# Patient Record
Sex: MALE | Race: WHITE | NOT HISPANIC OR LATINO | Employment: OTHER | URBAN - METROPOLITAN AREA
[De-identification: names, ages, dates, MRNs, and addresses within clinical notes are randomized per-mention and may not be internally consistent; named-entity substitution may affect disease eponyms.]

---

## 2017-01-07 DIAGNOSIS — E03.9 HYPOTHYROIDISM, UNSPECIFIED TYPE: Chronic | ICD-10-CM

## 2017-01-07 RX ORDER — TADALAFIL 5 MG/1
5 TABLET ORAL DAILY
Qty: 90 TAB | Refills: 1 | Status: SHIPPED | OUTPATIENT
Start: 2017-01-07 | End: 2017-05-27 | Stop reason: SDUPTHER

## 2017-01-07 RX ORDER — LEVOTHYROXINE SODIUM 175 UG/1
TABLET ORAL
Qty: 90 TAB | Refills: 1 | Status: SHIPPED | OUTPATIENT
Start: 2017-01-07 | End: 2017-06-26 | Stop reason: SDUPTHER

## 2017-01-07 NOTE — Clinical Note
January 9, 2017        Luan Schmidt  [address] [facility] [address]        Dear Luan:    .We recently received a medication refill request form your pharmacy for yourlevothyroxine (SYNTHROID) 175 MCG Tab, tadalafil (CIALIS) 5 MG tablet Dr. Rutherford has refilled these medication for 30 each only and has sent it to your pharmacy, he has ordered fasting labs for you to complete which we have attached so we can make certain the proper dosing is correct on your medications .       If you have any questions please feel free to contact me at [phone].        If you have any questions or concerns, please don't hesitate to call.        Sincerely,        Luis E Rutherford M.D.    Electronically Signed

## 2017-01-30 ENCOUNTER — HOSPITAL ENCOUNTER (OUTPATIENT)
Dept: LAB | Facility: MEDICAL CENTER | Age: 62
End: 2017-01-30
Attending: INTERNAL MEDICINE
Payer: COMMERCIAL

## 2017-01-30 DIAGNOSIS — E03.9 HYPOTHYROIDISM, UNSPECIFIED TYPE: Chronic | ICD-10-CM

## 2017-01-30 LAB — TSH SERPL DL<=0.005 MIU/L-ACNC: 1.38 UIU/ML (ref 0.3–3.7)

## 2017-01-30 PROCEDURE — 36415 COLL VENOUS BLD VENIPUNCTURE: CPT

## 2017-01-30 PROCEDURE — 84443 ASSAY THYROID STIM HORMONE: CPT

## 2017-01-31 ENCOUNTER — TELEPHONE (OUTPATIENT)
Dept: MEDICAL GROUP | Facility: MEDICAL CENTER | Age: 62
End: 2017-01-31

## 2017-01-31 NOTE — TELEPHONE ENCOUNTER
----- Message from Luis E Rutherford M.D. sent at 1/30/2017 10:02 PM PST -----  Please notify patient that the thyroid test is normal and he can continue on the current dose six days per week

## 2017-03-15 RX ORDER — ZOLPIDEM TARTRATE 10 MG/1
10 TABLET ORAL NIGHTLY PRN
Qty: 90 EACH | Refills: 1 | Status: SHIPPED
Start: 2017-03-15 | End: 2017-09-07 | Stop reason: SDUPTHER

## 2017-05-06 ENCOUNTER — OFFICE VISIT (OUTPATIENT)
Dept: URGENT CARE | Facility: PHYSICIAN GROUP | Age: 62
End: 2017-05-06
Payer: COMMERCIAL

## 2017-05-06 VITALS
HEART RATE: 55 BPM | SYSTOLIC BLOOD PRESSURE: 132 MMHG | OXYGEN SATURATION: 93 % | WEIGHT: 243 LBS | BODY MASS INDEX: 32.91 KG/M2 | HEIGHT: 72 IN | DIASTOLIC BLOOD PRESSURE: 80 MMHG | TEMPERATURE: 98.2 F

## 2017-05-06 DIAGNOSIS — J98.01 ACUTE BRONCHOSPASM: ICD-10-CM

## 2017-05-06 DIAGNOSIS — J22 ACUTE LOWER RESPIRATORY INFECTION: Primary | ICD-10-CM

## 2017-05-06 DIAGNOSIS — H10.9 CONJUNCTIVITIS OF RIGHT EYE, UNSPECIFIED CONJUNCTIVITIS TYPE: ICD-10-CM

## 2017-05-06 PROCEDURE — 99214 OFFICE O/P EST MOD 30 MIN: CPT | Performed by: PHYSICIAN ASSISTANT

## 2017-05-06 RX ORDER — ALBUTEROL SULFATE 90 UG/1
2 AEROSOL, METERED RESPIRATORY (INHALATION) EVERY 4 HOURS PRN
Qty: 1 INHALER | Refills: 1 | Status: SHIPPED | OUTPATIENT
Start: 2017-05-06 | End: 2017-09-07

## 2017-05-06 RX ORDER — POLYMYXIN B SULFATE AND TRIMETHOPRIM 1; 10000 MG/ML; [USP'U]/ML
1 SOLUTION OPHTHALMIC EVERY 4 HOURS
Qty: 10 ML | Refills: 0 | Status: SHIPPED | OUTPATIENT
Start: 2017-05-06 | End: 2017-05-13

## 2017-05-06 RX ORDER — DOXYCYCLINE HYCLATE 100 MG
100 TABLET ORAL 2 TIMES DAILY
Qty: 20 TAB | Refills: 0 | Status: SHIPPED | OUTPATIENT
Start: 2017-05-06 | End: 2017-05-16

## 2017-05-06 ASSESSMENT — ENCOUNTER SYMPTOMS: COUGH: 1

## 2017-05-06 NOTE — MR AVS SNAPSHOT
Luan Schmidt   2017 2:30 PM   Office Visit   MRN: [id number]    Department:  [facility]   Dept Phone:  [phone]    Description:  Male : 1955   Provider:  Rebekah Schneider PA-C           Reason for Visit     Cough sore thrat x1 wk, poss pink eye       Allergies as of 2017     No Known Allergies      You were diagnosed with     Acute lower respiratory infection   [447653]  -  Primary     Acute bronchospasm   [519.11.ICD-9-CM]       Conjunctivitis of right eye, unspecified conjunctivitis type   [6630226]         Vital Signs     Blood Pressure Pulse Temperature Height Weight Body Mass Index    132/80 mmHg 55 36.8 °C (98.2 °F) 1.829 m (6') 110.224 kg (243 lb) 32.95 kg/m2    Oxygen Saturation Smoking Status                93% Former Smoker          Basic Information     Date Of Birth Sex Race Ethnicity Preferred Language    1955 Male White Non- English      Problem List              ICD-10-CM Priority Class Noted - Resolved    Hypothyroid (Chronic) E03.9   2009 - Present    Dyslipidemia (Chronic) E78.5   2009 - Present    Rosacea L71.9   2009 - Present    Hypertension (Chronic) I10   2009 - Present    History of gout (Chronic) Z87.39   2009 - Present    S/P appendectomy Z90.49   2009 - Present    Preventative health care (Chronic) Z00.00   2009 - Present    Erectile dysfunction N52.9   2011 - Present    History of hearing loss Z86.69   2012 - Present    Peripheral neuropathy G62.9   2012 - Present    Obesity (BMI 30-39.9) E66.9   2014 - Present    Insomnia (Chronic) G47.00   2014 - Present    Renal cyst N28.1   10/29/2015 - Present    Dyspepsia R10.13   2016 - Present    Anxiety F41.9   2016 - Present      Health Maintenance        Date Due Completion Dates    IMM DTaP/Tdap/Td Vaccine (2 - Td) 2021    COLONOSCOPY 2022 (Done)    Override on 2012: Done               Current Immunizations     SHINGLES VACCINE 8/26/2014    Tdap Vaccine 12/30/2011      Below and/or attached are the medications your provider expects you to take. Review all of your home medications and newly ordered medications with your provider and/or pharmacist. Follow medication instructions as directed by your provider and/or pharmacist. Please keep your medication list with you and share with your provider. Update the information when medications are discontinued, doses are changed, or new medications (including over-the-counter products) are added; and carry medication information at all times in the event of emergency situations     Allergies:  No Known Allergies          Medications  Valid as of: May 06, 2017 -  3:01 PM    Generic Name Brand Name Tablet Size Instructions for use    Albuterol Sulfate (Aero Soln) albuterol 108 (90 BASE) MCG/ACT Inhale 2 Puffs by mouth every four hours as needed for Shortness of Breath (bronchospasm).        Aspirin (Chew Tab) ASA 81 MG Take 1 Tab by mouth every day.        Atorvastatin Calcium (Tab) LIPITOR 20 MG Take 1 Tab by mouth every day.        Doxycycline Hyclate (Tab) VIBRAMYCIN 100 MG Take 1 Tab by mouth 2 times a day for 10 days.        Ergocalciferol (Cap) DRISDOL 32325 UNIT Take 1 Cap by mouth every 7 days.        Hydrocod Polst-Chlorphen Polst (Suspension Extended Release) TUSSIONEX 10-8 MG/5ML Take 5 mL by mouth every 12 hours.        Indomethacin (Cap) INDOCIN 50 MG Take 1 Cap by mouth 3 times a day as needed (gout flare up).        Levothyroxine Sodium (Tab) SYNTHROID 175 MCG One po 6 days per week        Lisinopril (Tab) PRINIVIL 10 MG Take 1 Tab by mouth every day.        Metoprolol Succinate (TABLET SR 24 HR) TOPROL  MG Take 1 Tab by mouth every day.        Polymyxin B-Trimethoprim (Solution) POLYTRIM 24320-3.1 UNIT/ML-% Place 1 Drop in both eyes every 4 hours for 7 days.        Sertraline HCl (Tab) ZOLOFT 50 MG Take 1 Tab by mouth every day.         Tadalafil (Tab) CIALIS 5 MG Take 1 Tab by mouth every day.        Zolpidem Tartrate (Tab) AMBIEN 10 MG Take 1 Tab by mouth at bedtime as needed for Sleep.        .                 Medicines prescribed today were sent to:     [facility] - [address]    [address] [facility] [[email or web address]] [address]    Phone: [phone] Fax: [phone]    Open 24 Hours?: No    [id number] - [place], [place] - [address]    [address] [facility] [place] [address]    Phone: [phone] Fax: [phone]    Open 24 Hours?: No    \[facility]"" [facility] #[id number] - [place] - [address]    [address] [place] [address]    Phone: [phone] Fax: [phone]    Open 24 Hours?: No      Medication refill instructions:       If your prescription bottle indicates you have medication refills left, it is not necessary to call your provider’s office. Please contact your pharmacy and they will refill your medication.    If your prescription bottle indicates you do not have any refills left, you may request refills at any time through one of the following ways: The online [employer] system (except Urgent Care), by calling your provider’s office, or by asking your pharmacy to contact your provider’s office with a refill request. Medication refills are processed only during regular business hours and may not be available until the next business day. Your provider may request additional information or to have a follow-up visit with you prior to refilling your medication.   *Please Note: Medication refills are assigned a new Rx number when refilled electronically. Your pharmacy may indicate that no refills were authorized even though a new prescription for the same medication is available at the pharmacy. Please request the medicine by name with the pharmacy before contacting your provider for a refill.        Instructions    Cough, Adult   A cough is a reflex that helps clear your throat and airways.  It can help heal the body or may be a reaction to an irritated airway. A cough may only last 2 or 3 weeks (acute) or may last more than 8 weeks (chronic).   CAUSES  Acute cough:  · Viral or bacterial infections.  Chronic cough:  · Infections.  · Allergies.  · Asthma.  · Post-nasal drip.  · Smoking.  · Heartburn or acid reflux.  · Some medicines.  · Chronic lung problems (COPD).  · Cancer.  SYMPTOMS   · Cough.  · Fever.  · Chest pain.  · Increased breathing rate.  · High-pitched whistling sound when breathing (wheezing).  · Colored mucus that you cough up (sputum).  TREATMENT   · A bacterial cough may be treated with antibiotic medicine.  · A viral cough must run its course and will not respond to antibiotics.  · Your caregiver may recommend other treatments if you have a chronic cough.  HOME CARE INSTRUCTIONS   · Only take over-the-counter or prescription medicines for pain, discomfort, or fever as directed by your caregiver. Use cough suppressants only as directed by your caregiver.  · Use a cold steam vaporizer or humidifier in your bedroom or home to help loosen secretions.  · Sleep in a semi-upright position if your cough is worse at night.  · Rest as needed.  · Stop smoking if you smoke.  SEEK IMMEDIATE MEDICAL CARE IF:   · You have pus in your sputum.  · Your cough starts to worsen.  · You cannot control your cough with suppressants and are losing sleep.  · You begin coughing up blood.  · You have difficulty breathing.  · You develop pain which is getting worse or is uncontrolled with medicine.  · You have a fever.  MAKE SURE YOU:   · Understand these instructions.  · Will watch your condition.  · Will get help right away if you are not doing well or get worse.     This information is not intended to replace advice given to you by your health care provider. Make sure you discuss any questions you have with your health care provider.     Document Released: 06/15/2012 Document Revised: 03/11/2013 Document  Reviewed: 02/24/2016  [employer] Interactive Patient Education ©2016 Elsevier Inc.         Other Notes About Your Plan     8/19/16 RONALD score 10, PHQ 4, start zoloft 50 mg  FIT                       MyChart Status: Patient Declined

## 2017-05-06 NOTE — PROGRESS NOTES
Subjective:      Luan Scmhidt is a 61 y.o. male who presents with Cough    PMH:  has a past medical history of Dyslipidemia (8/23/2009); Hypertension; and Renal cyst (10/29/2015). He also has no past medical history of Ulcer (CMS-HCC).  MEDS:   Current outpatient prescriptions:   •  doxycycline (VIBRAMYCIN) 100 MG Tab, Take 1 Tab by mouth 2 times a day for 10 days., Disp: 20 Tab, Rfl: 0  •  albuterol 108 (90 BASE) MCG/ACT Aero Soln inhalation aerosol, Inhale 2 Puffs by mouth every four hours as needed for Shortness of Breath (bronchospasm)., Disp: 1 Inhaler, Rfl: 1  •  Hydrocod Polst-CPM Polst ER (TUSSIONEX) 10-8 MG/5ML Suspension Extended Release, Take 5 mL by mouth every 12 hours., Disp: 140 mL, Rfl: 0  •  polymixin-trimethoprim (POLYTRIM) 75582-2.1 UNIT/ML-% Solution, Place 1 Drop in both eyes every 4 hours for 7 days., Disp: 10 mL, Rfl: 0  •  sertraline (ZOLOFT) 50 MG Tab, Take 1 Tab by mouth every day., Disp: 90 Tab, Rfl: 1  •  zolpidem (AMBIEN) 10 MG Tab, Take 1 Tab by mouth at bedtime as needed for Sleep., Disp: 90 Each, Rfl: 1  •  lisinopril (PRINIVIL) 10 MG Tab, Take 1 Tab by mouth every day., Disp: 90 Tab, Rfl: 2  •  metoprolol SR (TOPROL XL) 100 MG TABLET SR 24 HR, Take 1 Tab by mouth every day., Disp: 90 Tab, Rfl: 2  •  levothyroxine (SYNTHROID) 175 MCG Tab, One po 6 days per week, Disp: 90 Tab, Rfl: 1  •  tadalafil (CIALIS) 5 MG tablet, Take 1 Tab by mouth every day., Disp: 90 Tab, Rfl: 1  •  atorvastatin (LIPITOR) 20 MG Tab, Take 1 Tab by mouth every day., Disp: 90 Tab, Rfl: 2  •  indomethacin (INDOCIN) 50 MG Cap, Take 1 Cap by mouth 3 times a day as needed (gout flare up)., Disp: 90 Cap, Rfl: 2  •  ergocalciferol (DRISDOL) 17360 UNIT capsule, Take 1 Cap by mouth every 7 days., Disp: 12 Cap, Rfl: 6  •  aspirin (ASA) 81 MG CHEW, Take 1 Tab by mouth every day., Disp: 100 Each, Rfl: 11  ALLERGIES: No Known Allergies  SURGHX:   Past Surgical History   Procedure Laterality Date   • Appendectomy        SOCHX:  reports that he has quit smoking. He has never used smokeless tobacco. He reports that he drinks alcohol. He reports that he does not use illicit drugs.  FH: family history includes Heart Disease in his father. Reviewed with patient/family. Not pertinent to this complaint.            HPI Comments: Patient presents with:  Cough: cough x 3 weeks, sore throat x1 wk, poss pink eye past few days.        Cough  This is a new problem. The current episode started 1 to 4 weeks ago. The problem has been gradually worsening. The problem occurs every few minutes. The cough is productive of sputum. Associated symptoms include eye redness, postnasal drip, rhinorrhea, a sore throat, shortness of breath and wheezing. Pertinent negatives include no chest pain or fever. The symptoms are aggravated by exercise and lying down. Risk factors for lung disease include smoking/tobacco exposure. He has tried body position changes, OTC cough suppressant and rest for the symptoms. The treatment provided no relief. His past medical history is significant for bronchitis. There is no history of COPD.       Review of Systems   Constitutional: Negative for fever.   HENT: Positive for congestion, postnasal drip, rhinorrhea and sore throat.    Eyes: Positive for discharge and redness.   Respiratory: Positive for cough, sputum production, shortness of breath and wheezing.    Cardiovascular: Negative for chest pain and palpitations.   All other systems reviewed and are negative.         Objective:     /80 mmHg  Pulse 55  Temp(Src) 36.8 °C (98.2 °F)  Ht 1.829 m (6')  Wt 110.224 kg (243 lb)  BMI 32.95 kg/m2  SpO2 93%     Physical Exam   Constitutional: He appears well-developed and well-nourished. He appears ill. No distress.   HENT:   Head: Normocephalic.   Right Ear: Tympanic membrane normal.   Left Ear: Tympanic membrane normal.   Nose: Mucosal edema and rhinorrhea present.   Mouth/Throat: Uvula is midline. Posterior  oropharyngeal erythema present. No posterior oropharyngeal edema.   Eyes: EOM and lids are normal. Pupils are equal, round, and reactive to light. Right eye exhibits discharge. Left eye exhibits no discharge. Right conjunctiva is injected.   Neck: Normal range of motion. Neck supple.   Cardiovascular: Normal rate, regular rhythm and normal heart sounds.    Pulmonary/Chest: Effort normal. He has no decreased breath sounds. He has wheezes. He has rhonchi. He has no rales.   Abdominal: Soft.   Musculoskeletal: Normal range of motion.   Lymphadenopathy:     He has no cervical adenopathy.   Neurological: He is alert.   Skin: Skin is warm and dry.   Psychiatric: He has a normal mood and affect.   Nursing note and vitals reviewed.              Assessment/Plan:     1. Acute lower respiratory infection  doxycycline (VIBRAMYCIN) 100 MG Tab    albuterol 108 (90 BASE) MCG/ACT Aero Soln inhalation aerosol    Hydrocod Polst-CPM Polst ER (TUSSIONEX) 10-8 MG/5ML Suspension Extended Release    polymixin-trimethoprim (POLYTRIM) 72671-5.1 UNIT/ML-% Solution   2. Acute bronchospasm  doxycycline (VIBRAMYCIN) 100 MG Tab    albuterol 108 (90 BASE) MCG/ACT Aero Soln inhalation aerosol    Hydrocod Polst-CPM Polst ER (TUSSIONEX) 10-8 MG/5ML Suspension Extended Release    polymixin-trimethoprim (POLYTRIM) 13894-4.1 UNIT/ML-% Solution   3. Conjunctivitis of right eye, unspecified conjunctivitis type       PT instructed not to drive or operate heavy machinery while taking this medication as it causes drowsiness.  PT also instructed not to drink alcohol while taking this medication because it contains narcotics. PT verbalized understanding of these instructions.     PT can continue OTC medications, increase fluids and rest until symptoms improve.     PT should follow up with PCP in 1-2 days for re-evaluation if symptoms have not improved.  Discussed red flags and reasons to return to UC or ED.  Pt and/or family verbalized understanding of  diagnosis and follow up instructions and was given informational handout on diagnosis.  PT discharged.

## 2017-05-06 NOTE — PATIENT INSTRUCTIONS
Cough, Adult   A cough is a reflex that helps clear your throat and airways. It can help heal the body or may be a reaction to an irritated airway. A cough may only last 2 or 3 weeks (acute) or may last more than 8 weeks (chronic).   CAUSES  Acute cough:  · Viral or bacterial infections.  Chronic cough:  · Infections.  · Allergies.  · Asthma.  · Post-nasal drip.  · Smoking.  · Heartburn or acid reflux.  · Some medicines.  · Chronic lung problems (COPD).  · Cancer.  SYMPTOMS   · Cough.  · Fever.  · Chest pain.  · Increased breathing rate.  · High-pitched whistling sound when breathing (wheezing).  · Colored mucus that you cough up (sputum).  TREATMENT   · A bacterial cough may be treated with antibiotic medicine.  · A viral cough must run its course and will not respond to antibiotics.  · Your caregiver may recommend other treatments if you have a chronic cough.  HOME CARE INSTRUCTIONS   · Only take over-the-counter or prescription medicines for pain, discomfort, or fever as directed by your caregiver. Use cough suppressants only as directed by your caregiver.  · Use a cold steam vaporizer or humidifier in your bedroom or home to help loosen secretions.  · Sleep in a semi-upright position if your cough is worse at night.  · Rest as needed.  · Stop smoking if you smoke.  SEEK IMMEDIATE MEDICAL CARE IF:   · You have pus in your sputum.  · Your cough starts to worsen.  · You cannot control your cough with suppressants and are losing sleep.  · You begin coughing up blood.  · You have difficulty breathing.  · You develop pain which is getting worse or is uncontrolled with medicine.  · You have a fever.  MAKE SURE YOU:   · Understand these instructions.  · Will watch your condition.  · Will get help right away if you are not doing well or get worse.     This information is not intended to replace advice given to you by your health care provider. Make sure you discuss any questions you have with your health care provider.      Document Released: 06/15/2012 Document Revised: 03/11/2013 Document Reviewed: 02/24/2016  [employer] Interactive Patient Education ©2016 Elsevier Inc.

## 2017-05-08 ASSESSMENT — ENCOUNTER SYMPTOMS
RHINORRHEA: 1
SHORTNESS OF BREATH: 1
SORE THROAT: 1
EYE DISCHARGE: 1
EYE REDNESS: 1
SPUTUM PRODUCTION: 1
FEVER: 0
WHEEZING: 1
PALPITATIONS: 0

## 2017-05-08 ASSESSMENT — COPD QUESTIONNAIRES: COPD: 0

## 2017-09-01 ENCOUNTER — TELEPHONE (OUTPATIENT)
Dept: MEDICAL GROUP | Facility: MEDICAL CENTER | Age: 62
End: 2017-09-01

## 2017-09-01 RX ORDER — TADALAFIL 5 MG/1
5 TABLET ORAL DAILY
Qty: 90 TAB | Refills: 0 | Status: SHIPPED | OUTPATIENT
Start: 2017-09-01 | End: 2017-09-20 | Stop reason: SDUPTHER

## 2017-09-07 ENCOUNTER — OFFICE VISIT (OUTPATIENT)
Dept: MEDICAL GROUP | Facility: MEDICAL CENTER | Age: 62
End: 2017-09-07
Payer: COMMERCIAL

## 2017-09-07 VITALS
OXYGEN SATURATION: 96 % | BODY MASS INDEX: 34.27 KG/M2 | SYSTOLIC BLOOD PRESSURE: 128 MMHG | HEIGHT: 72 IN | TEMPERATURE: 98.1 F | WEIGHT: 253 LBS | HEART RATE: 63 BPM | DIASTOLIC BLOOD PRESSURE: 84 MMHG

## 2017-09-07 DIAGNOSIS — G47.00 INSOMNIA, UNSPECIFIED TYPE: Chronic | ICD-10-CM

## 2017-09-07 DIAGNOSIS — Z12.11 COLON CANCER SCREENING: ICD-10-CM

## 2017-09-07 DIAGNOSIS — E66.9 OBESITY (BMI 30-39.9): ICD-10-CM

## 2017-09-07 DIAGNOSIS — Z12.5 PROSTATE CANCER SCREENING: ICD-10-CM

## 2017-09-07 DIAGNOSIS — E78.5 DYSLIPIDEMIA: Chronic | ICD-10-CM

## 2017-09-07 DIAGNOSIS — E03.9 HYPOTHYROIDISM, UNSPECIFIED TYPE: Chronic | ICD-10-CM

## 2017-09-07 DIAGNOSIS — Z23 NEEDS FLU SHOT: ICD-10-CM

## 2017-09-07 DIAGNOSIS — Z00.00 PREVENTATIVE HEALTH CARE: Chronic | ICD-10-CM

## 2017-09-07 DIAGNOSIS — Z87.39 HISTORY OF GOUT: Chronic | ICD-10-CM

## 2017-09-07 DIAGNOSIS — I15.9 SECONDARY HYPERTENSION: Chronic | ICD-10-CM

## 2017-09-07 PROCEDURE — 99396 PREV VISIT EST AGE 40-64: CPT | Mod: 25 | Performed by: INTERNAL MEDICINE

## 2017-09-07 PROCEDURE — 90471 IMMUNIZATION ADMIN: CPT | Performed by: INTERNAL MEDICINE

## 2017-09-07 PROCEDURE — 90686 IIV4 VACC NO PRSV 0.5 ML IM: CPT | Performed by: INTERNAL MEDICINE

## 2017-09-07 RX ORDER — ATORVASTATIN CALCIUM 20 MG/1
20 TABLET, FILM COATED ORAL DAILY
Qty: 90 TAB | Refills: 3 | Status: SHIPPED | OUTPATIENT
Start: 2017-09-07 | End: 2018-08-05 | Stop reason: SDUPTHER

## 2017-09-07 RX ORDER — ZOLPIDEM TARTRATE 10 MG/1
10 TABLET ORAL NIGHTLY PRN
Qty: 90 EACH | Refills: 1 | Status: SHIPPED
Start: 2017-09-07 | End: 2018-08-05 | Stop reason: SDUPTHER

## 2017-09-07 ASSESSMENT — PATIENT HEALTH QUESTIONNAIRE - PHQ9: CLINICAL INTERPRETATION OF PHQ2 SCORE: 0

## 2017-09-07 ASSESSMENT — ENCOUNTER SYMPTOMS
HEADACHES: 0
ABDOMINAL PAIN: 0
BACK PAIN: 0
CONSTIPATION: 0
DEPRESSION: 0
WEIGHT LOSS: 0
BLURRED VISION: 0
DOUBLE VISION: 0
NERVOUS/ANXIOUS: 0
COUGH: 0
HEARTBURN: 0
PALPITATIONS: 0
DIARRHEA: 0
SHORTNESS OF BREATH: 0
INSOMNIA: 1
DIZZINESS: 0

## 2017-09-07 NOTE — PROGRESS NOTES
Subjective:      Luan Schmidt is a 62 y.o. male who presents with Annual Exam            HPI   Here for annual exam  meds and allergies reviewed  Takes thyroid med daily  Takes glucosamine  Taking ambien nightly  Not checking blood pressure on lisinopril and toprol   Dyslipidemia on lipitor no muscle pain or aches  SH no coffee, etoh one 1/2 bottle of wine, no soda, working full time at [employer] and at [employer], no tobacco  Patient getting  tomorrow, no children  FH no changes  Eye exam annually uses glasses for reading and distance   Hearing no changes  Teeth cleaning twice per year   No vision changes   Some knee stiffness at times, no swelling, takes one aleve every morning for left shoulder pain when getting up in the am,No radiation down the arm, no cervical radiculopathy, no neck pain, no weakness, right hand dominant, takes aleve one at night, no GI upset, no nausea or abdominal pain, no dysuria, no hematuria , nocturia one time  No back pain  No chest pain, No shortness of breath, cough, lightheadedness, syncope  No nausea, vomiting, diarrhea, no abdominal pain, no constipation, no melena or hematochezia  No depression, anxiety improved on Zoloft without side effects  No flareup of gout on allopurinol    Medication, allergies, medical history, surgical history, social history, family history reviewed and updated          Current Outpatient Prescriptions   Medication Sig Dispense Refill   • tadalafil (CIALIS) 5 MG tablet Take 1 Tab by mouth every day. 90 Tab 0   • levothyroxine (SYNTHROID) 175 MCG Tab Take one po 6 days per week 78 Tab 1   • albuterol 108 (90 BASE) MCG/ACT Aero Soln inhalation aerosol Inhale 2 Puffs by mouth every four hours as needed for Shortness of Breath (bronchospasm). 1 Inhaler 1   • Hydrocod Polst-CPM Polst ER (TUSSIONEX) 10-8 MG/5ML Suspension Extended Release Take 5 mL by mouth every 12 hours. 140 mL 0   • sertraline (ZOLOFT) 50 MG Tab Take 1 Tab by mouth every day. 90 Tab  1   • zolpidem (AMBIEN) 10 MG Tab Take 1 Tab by mouth at bedtime as needed for Sleep. 90 Each 1   • lisinopril (PRINIVIL) 10 MG Tab Take 1 Tab by mouth every day. 90 Tab 2   • metoprolol SR (TOPROL XL) 100 MG TABLET SR 24 HR Take 1 Tab by mouth every day. 90 Tab 2   • atorvastatin (LIPITOR) 20 MG Tab Take 1 Tab by mouth every day. 90 Tab 2   • indomethacin (INDOCIN) 50 MG Cap Take 1 Cap by mouth 3 times a day as needed (gout flare up). 90 Cap 2   • ergocalciferol (DRISDOL) 82228 UNIT capsule Take 1 Cap by mouth every 7 days. 12 Cap 6   • aspirin (ASA) 81 MG CHEW Take 1 Tab by mouth every day. 100 Each 11     No current facility-administered medications for this visit.      Patient Active Problem List    Diagnosis Date Noted   • Anxiety 08/19/2016   • Dyspepsia 02/06/2016   • Renal cyst 10/29/2015   • Obesity (BMI 30-39.9) 08/26/2014   • Insomnia 08/26/2014   • Peripheral neuropathy 12/24/2012   • History of hearing loss 09/17/2012   • Erectile dysfunction 12/30/2011   • Hypothyroid 08/23/2009   • Dyslipidemia 08/23/2009   • Rosacea 08/23/2009   • Hypertension 08/23/2009   • History of gout 08/23/2009   • S/P appendectomy 08/23/2009   • Preventative health care 08/23/2009       Anxiety  8/19/16 RONALD score 10, PHQ 4, start zoloft 50 mg, declines psychotherapy    Dyslipidemia  3/09 chol  299, trig 367,hdl 47, ldl 179 started on simcor no followup testing   6/10 chol 278,trig 366,hdl 51,ldl 154 add fish oil 2 pills bid, will not increase simcor due to hx gout  4/11 off simcor  4/11 chol 215,trig 232,hdl 39,ldl 130 off meds  12/31/11 chol 245,trig 240,hdl 48,ldl 149 off meds  10/12 chol 268,trig 239,hdl 54,ldl 166 start fish oil two pills daily  11/29/13 chol 265,trig 229,hdl 46,ldl 168  12/19/13 start lipitor 20 mg  3/27/14 chol 190,trig 164,hdl 51,ldl 106 on lipitor 20 mg  8/29/14 chol 208,trig 280,hdl 51,ldl 101 on lipitor 20 mg  10/15/15 chol 187,trig 223,hdl 46,ldl 96 on lipitor 20 mg  9/16/16 chol 182,trig 262,hdl  46,ldl 84 on lipitor 20 mg     Dyspepsia  10/9/15 dyspepsia, chest x-ray, ultrasound, stress echo pending  10/29/15 ultrasound enlarged liver increased echotexture fatty infiltration or hepatocellular disease   11/31/15 TSH ordered for next month and labs for peripheral neuropathy feet, declines MRI lumbar spine repeat [facility] NCS for now, cont limit etoh  11/19/15 stress echo mild concentric LVH on baseline echo, excellent exercise tolerance, negative stress echo for ischemia, brenna protocol 11 minutes 48 seconds  11/19/15 cxr negative    Erectile dysfunction  12/20/11 sample cialis 10 mg and 5 mg  3/17/14 change cialis to 2.5 mg cost  8/26/14 samples cialis 5 mg  9/16/16 testosterone 234     History of gout  On no medication for over 2 years  6/10 uric 9.6 monitor for gout flare up since on simcor  4/18/11 left elbow pain question gout flareup  4/11 uric 7.1  12/11 uric 7.0  1/17/12 gout flare up  10/12 uric 7.8  11/29/13 uric 7.9  8/29/14 uric 7.9  10/15/15 uric 8.8  9/16/16 uric 7.7     Hearing loss  9/12  audiology note mild high-frequency sensorineural loss right ear, brainstem response normal     Hypertension  6/14/10 add lisinopril 10 mg to toprol  mg  3/15/13 treadmill thallium, treadmill component 10 minutes 48 seconds 1 mm upsloping ST segment depression nondiagnostic, hypertensive response to exercise; ejection fraction 61%, no ischemia noted  11/29/13 urine mac < 2.4 on lisinopril 10 mg and toprol  mg  10/15/15 urine mac <0.5 on lisinopril 10 mg and toprol  mg  11/19/15 stress echo mild concentric LVH on baseline echo, excellent exercise tolerance, negative stress echo for ischemia, brenna protocol 11 minutes 48 seconds  9/16/16 urine mac<0.7 on lisinopril 10 mg and toprol  mg     Hypothyroid  3/09 tsh 7.5 at that time had been on 0.2 mg half a tablet plus 0.125 for a total of 0.225 daily, this was changed to 0.2 mg one tablet po every Tuesday Thursday Saturday Sunday and 0.2  mg 1-1/2 tablets on Monday Wednesday Friday  6/10 tsh 0.148  6/23/10 change to levothyroxine 0.2 daily repeat in 6 weeks  8/27/10 tsh 2.7   4/11 tsh 0.6 on levothyroxine 200 mcg  10/12 tsh 27 on levothyroxine 200 mcg but not taking regularly  11/29/13 tsh 8.6 on levothyroxine 200 mcg daily  12/19/13 take levothyroxine 200 mg before eating  3/27/14 tsh 0.8 on levothyroxine 200 mcg  8/29/14 tsh 8.3 on levothyroxine 200 mcg taking thyroid med with supplements in am he will try to change to taking medication separate from everything, repeat labs 6 weeks  10/15/15 tsh 0.12 on levothyroxine 200 mcg daily change to levothyroxine 175 mcg  3/23/16 tsh 1.1 on levothyroxine 175 mcg  9/16/16 tsh 0.1 on levothyroxine 175 mcg change to one po six days per week, repeat tsh 6 weeks  1/30/17 tsh 1.3 on levothyroxine 175 mcg six days per week    Insomnia  8/26/14 trial ambien 10 mg     Peripheral neuropathy  10/27/12 right foot neuropathy labs tsh 27, MAXIMUS,SPEP,folate,B12,B1,ESR negative, if negative consider EMG NCS  10/29/12 pt states not taking levothyroxine 200 mcg reg, will start daily and repeat labs tsh and lipid in 6 weeks  12/24/12 no improvement in symptoms when taking levothyroxine regularly, has decreased etoh  2/13 EMG NCS normal motor and sensory nerve conduction studies bilateral lower extremities     Preventive health  12/30/11 tdap  2/29/12 colon per [facility] repeat in 10 yrs  8/26/14 zoster vaccine  10/9/15 declines prostate and rectal exam  9/16/16 psa 0.45  9/16/16 vit d 11 start [id number] weekly     Renal cyst  10/29/15 ultrasound enlarged liver increased echotexture fatty infiltration or hepatocellular disease, 3.2 cm probable complicated cystic exophytic lesion right kidney  11/30/15 MRI abdomen with and without; posterior lateral exophytic right kidney lesion 2.6 x 1.8 x 2.9 cm, no internal enhancement, consistent with benign cyst, also incidental exophytic 13 mm right renal cyst, no followup is necessary                Review of Systems   Constitutional: Negative for malaise/fatigue and weight loss.   HENT: Negative for hearing loss and tinnitus.    Eyes: Negative for blurred vision and double vision.   Respiratory: Negative for cough and shortness of breath.    Cardiovascular: Negative for chest pain and palpitations.   Gastrointestinal: Negative for abdominal pain, constipation, diarrhea and heartburn.   Genitourinary: Negative for frequency.   Musculoskeletal: Negative for back pain and joint pain.   Skin: Negative for rash.   Neurological: Negative for dizziness and headaches.   Endo/Heme/Allergies: Negative for environmental allergies.   Psychiatric/Behavioral: Negative for depression. The patient has insomnia. The patient is not nervous/anxious.      Depression Screening    Little interest or pleasure in doing things?  0 - not at all  Feeling down, depressed , or hopeless? 0 - not at all  Patient Health Questionnaire Score: 0          Objective:          Physical Exam   Constitutional: He appears well-developed and well-nourished.   HENT:   Head: Normocephalic and atraumatic.   Right Ear: External ear normal.   Left Ear: External ear normal.   Nose: Nose normal.   Mouth/Throat: Oropharynx is clear and moist.   Eyes: Conjunctivae are normal. Pupils are equal, round, and reactive to light. Right eye exhibits no discharge. Left eye exhibits no discharge. No scleral icterus.   Neck: Normal range of motion. Neck supple. No JVD present. No thyromegaly present.   Cardiovascular: Normal rate, regular rhythm and normal heart sounds.    No murmur heard.  Pulmonary/Chest: Effort normal and breath sounds normal. No respiratory distress. He has no wheezes.   Abdominal: Soft. Bowel sounds are normal. He exhibits no distension and no mass. There is no tenderness. There is no guarding.   Genitourinary:   Genitourinary Comments: Declines prostate and rectal exam   Musculoskeletal: He exhibits no edema or tenderness.   Neurological: He  is alert. He exhibits normal muscle tone. Coordination normal.   Skin: No erythema. No pallor.   Psychiatric: He has a normal mood and affect. His behavior is normal.   Nursing note and vitals reviewed.    No carotid bruits  Normal cervical range of motion  No neck adenopathy, no thyromegaly  Cranial nerves intact  Negative Romberg, normal strength upper and lower extremities  Lower extremities no edema  Knees no crepitus      Left shoulder normal range of motion flexion, extension, limited range of motion behind back, negative May  Left elbow normal range of motion       Assessment/Plan:     Assessment  #! Annual    #2 obesity BMI 34    #3 Anxiety started on Zoloft last year, symptoms stable and improved    #4 Dyslipidemia on Lipitor up in a muscle ache with statin therapy, not exercising     #5 History of gout 9/16/16 uric 7.7 No flareup off medication     #6 Hearing loss last hearing test 9/12  audiology note mild high-frequency sensorineural loss right ear, brainstem response normal, no current changes in hearing, tinnitus, vertigo      #7 Hypertension remains on lisinopril 10 mg and toprol  mg not checking blood pressures on a regular basis     #8 Hypothyroid most recent test 1/30/17 tsh 1.3 on levothyroxine 175 mcg six days per week     #9 Preventive health  12/30/11 tdap  2/29/12 colon per [facility] repeat in 10 yrs  8/26/14 zoster vaccine  10/9/15 declines prostate and rectal exam  9/16/16 psa 0.45  9/16/16 vit d 11 start [id number] weekly     #11 left shoulder likely impingement syndrome    #12 insomnia on Ambien unable to taper, denies worsening anxiety or depression        Plan  #1 influenza vaccine    #2 recommend physical therapy for left shoulder he declines    #3 can continue one Aleve per day monitor for GI upset    #4 declines nutrition consultation, importance of diet, exercise, weight loss discussed, obesity increases risk for diabetes and heart disease    #5 declines repeat hearing  test    #6 declines rectal exam    #7 FIT card provided    #8 labs    #9 next colonoscopy 5 years    #10 continue Zoloft , limit alcohol    #11 sleep hygiene discussed, understands and it may increase long-term risk of habituation, dependence, memory loss, recommend tapering medication, declines sleep psychology evaluation    #12 exercise regularly 30 minutes daily, nutrition discussed    #13 check blood pressure regularly and record, continue lisinopril and Toprol, orthostatic precautions    #14 follow-up one year

## 2017-10-03 ENCOUNTER — HOSPITAL ENCOUNTER (OUTPATIENT)
Dept: LAB | Facility: MEDICAL CENTER | Age: 62
End: 2017-10-03
Attending: INTERNAL MEDICINE
Payer: COMMERCIAL

## 2017-10-03 DIAGNOSIS — Z12.5 PROSTATE CANCER SCREENING: ICD-10-CM

## 2017-10-03 DIAGNOSIS — Z00.00 PREVENTATIVE HEALTH CARE: Chronic | ICD-10-CM

## 2017-10-03 DIAGNOSIS — Z87.39 HISTORY OF GOUT: Chronic | ICD-10-CM

## 2017-10-03 LAB
25(OH)D3 SERPL-MCNC: 30 NG/ML (ref 30–100)
ALBUMIN SERPL BCP-MCNC: 4.3 G/DL (ref 3.2–4.9)
ALBUMIN/GLOB SERPL: 1.5 G/DL
ALP SERPL-CCNC: 54 U/L (ref 30–99)
ALT SERPL-CCNC: 33 U/L (ref 2–50)
ANION GAP SERPL CALC-SCNC: 10 MMOL/L (ref 0–11.9)
APPEARANCE UR: CLEAR
AST SERPL-CCNC: 23 U/L (ref 12–45)
BASOPHILS # BLD AUTO: 0.7 % (ref 0–1.8)
BASOPHILS # BLD: 0.04 K/UL (ref 0–0.12)
BILIRUB SERPL-MCNC: 0.5 MG/DL (ref 0.1–1.5)
BILIRUB UR QL STRIP.AUTO: NEGATIVE
BUN SERPL-MCNC: 24 MG/DL (ref 8–22)
CALCIUM SERPL-MCNC: 9.4 MG/DL (ref 8.5–10.5)
CHLORIDE SERPL-SCNC: 105 MMOL/L (ref 96–112)
CHOLEST SERPL-MCNC: 221 MG/DL (ref 100–199)
CO2 SERPL-SCNC: 21 MMOL/L (ref 20–33)
COLOR UR: YELLOW
CREAT SERPL-MCNC: 1.04 MG/DL (ref 0.5–1.4)
CREAT UR-MCNC: 165.1 MG/DL
CULTURE IF INDICATED INDCX: NO UA CULTURE
EOSINOPHIL # BLD AUTO: 0.09 K/UL (ref 0–0.51)
EOSINOPHIL NFR BLD: 1.6 % (ref 0–6.9)
ERYTHROCYTE [DISTWIDTH] IN BLOOD BY AUTOMATED COUNT: 42.9 FL (ref 35.9–50)
EST. AVERAGE GLUCOSE BLD GHB EST-MCNC: 108 MG/DL
GFR SERPL CREATININE-BSD FRML MDRD: >60 ML/MIN/1.73 M 2
GLOBULIN SER CALC-MCNC: 2.9 G/DL (ref 1.9–3.5)
GLUCOSE SERPL-MCNC: 91 MG/DL (ref 65–99)
GLUCOSE UR STRIP.AUTO-MCNC: NEGATIVE MG/DL
HBA1C MFR BLD: 5.4 % (ref 0–5.6)
HCT VFR BLD AUTO: 45.9 % (ref 42–52)
HDLC SERPL-MCNC: 51 MG/DL
HGB BLD-MCNC: 15.5 G/DL (ref 14–18)
IMM GRANULOCYTES # BLD AUTO: 0.04 K/UL (ref 0–0.11)
IMM GRANULOCYTES NFR BLD AUTO: 0.7 % (ref 0–0.9)
KETONES UR STRIP.AUTO-MCNC: NEGATIVE MG/DL
LDLC SERPL CALC-MCNC: 105 MG/DL
LEUKOCYTE ESTERASE UR QL STRIP.AUTO: NEGATIVE
LYMPHOCYTES # BLD AUTO: 1.82 K/UL (ref 1–4.8)
LYMPHOCYTES NFR BLD: 31.8 % (ref 22–41)
MCH RBC QN AUTO: 32.2 PG (ref 27–33)
MCHC RBC AUTO-ENTMCNC: 33.8 G/DL (ref 33.7–35.3)
MCV RBC AUTO: 95.4 FL (ref 81.4–97.8)
MICRO URNS: NORMAL
MICROALBUMIN UR-MCNC: 0.8 MG/DL
MICROALBUMIN/CREAT UR: 5 MG/G (ref 0–30)
MONOCYTES # BLD AUTO: 0.47 K/UL (ref 0–0.85)
MONOCYTES NFR BLD AUTO: 8.2 % (ref 0–13.4)
NEUTROPHILS # BLD AUTO: 3.27 K/UL (ref 1.82–7.42)
NEUTROPHILS NFR BLD: 57 % (ref 44–72)
NITRITE UR QL STRIP.AUTO: NEGATIVE
NRBC # BLD AUTO: 0 K/UL
NRBC BLD AUTO-RTO: 0 /100 WBC
PH UR STRIP.AUTO: 5 [PH]
PLATELET # BLD AUTO: 194 K/UL (ref 164–446)
PMV BLD AUTO: 10.8 FL (ref 9–12.9)
POTASSIUM SERPL-SCNC: 4.2 MMOL/L (ref 3.6–5.5)
PROT SERPL-MCNC: 7.2 G/DL (ref 6–8.2)
PROT UR QL STRIP: NEGATIVE MG/DL
PSA SERPL-MCNC: 0.69 NG/ML (ref 0–4)
RBC # BLD AUTO: 4.81 M/UL (ref 4.7–6.1)
RBC UR QL AUTO: NEGATIVE
SODIUM SERPL-SCNC: 136 MMOL/L (ref 135–145)
SP GR UR STRIP.AUTO: 1.01
TRIGL SERPL-MCNC: 324 MG/DL (ref 0–149)
TSH SERPL DL<=0.005 MIU/L-ACNC: 2.17 UIU/ML (ref 0.3–3.7)
URATE SERPL-MCNC: 8.7 MG/DL (ref 2.5–8.3)
UROBILINOGEN UR STRIP.AUTO-MCNC: NORMAL MG/DL
WBC # BLD AUTO: 5.7 K/UL (ref 4.8–10.8)

## 2017-10-03 PROCEDURE — 82306 VITAMIN D 25 HYDROXY: CPT

## 2017-10-03 PROCEDURE — 84550 ASSAY OF BLOOD/URIC ACID: CPT

## 2017-10-03 PROCEDURE — 36415 COLL VENOUS BLD VENIPUNCTURE: CPT

## 2017-10-03 PROCEDURE — 81003 URINALYSIS AUTO W/O SCOPE: CPT

## 2017-10-03 PROCEDURE — 84443 ASSAY THYROID STIM HORMONE: CPT

## 2017-10-03 PROCEDURE — 85025 COMPLETE CBC W/AUTO DIFF WBC: CPT

## 2017-10-03 PROCEDURE — 82570 ASSAY OF URINE CREATININE: CPT

## 2017-10-03 PROCEDURE — 80053 COMPREHEN METABOLIC PANEL: CPT

## 2017-10-03 PROCEDURE — 84153 ASSAY OF PSA TOTAL: CPT

## 2017-10-03 PROCEDURE — 80061 LIPID PANEL: CPT

## 2017-10-03 PROCEDURE — 82043 UR ALBUMIN QUANTITATIVE: CPT

## 2017-10-03 PROCEDURE — 83036 HEMOGLOBIN GLYCOSYLATED A1C: CPT

## 2017-10-04 ENCOUNTER — TELEPHONE (OUTPATIENT)
Dept: MEDICAL GROUP | Facility: MEDICAL CENTER | Age: 62
End: 2017-10-04

## 2017-10-05 ENCOUNTER — TELEPHONE (OUTPATIENT)
Dept: MEDICAL GROUP | Facility: MEDICAL CENTER | Age: 62
End: 2017-10-05

## 2017-10-05 NOTE — TELEPHONE ENCOUNTER
Called patient and left message  Please notify patient that his tests show:  (1) normal blood sugar, liver function, kidney function, thyroid function, continue thyroid medication  (2) prostate cancer blood test is normal   (3) cholesterol has worsened slightly, total cholesterol is 221 compared to 182 previously, good cholesterol is 51 (goal is above 40), bad cholesterol is 105 (goal is less than 100), continue Lipitor, work on nutrition and exercise  (4) vitamin D level is normal  (5) I would like to repeat his blood tests in 6 months, he can call us at that time

## 2017-10-05 NOTE — TELEPHONE ENCOUNTER
----- Message from Luis E Rutherford M.D. sent at 10/4/2017 11:35 PM PDT -----  Called patient and left message  Please notify patient that his tests show:  (1) normal blood sugar, liver function, kidney function, thyroid function, continue thyroid medication  (2) prostate cancer blood test is normal   (3) cholesterol has worsened slightly, total cholesterol is 221 compared to 182 previously, good cholesterol is 51 (goal is above 40), bad cholesterol is 105 (goal is less than 100), continue Lipitor, work on nutrition and exercise  (4) vitamin D level is normal  (5) I would like to repeat his blood tests in 6 months, he can call us at that time

## 2017-10-11 NOTE — TELEPHONE ENCOUNTER
Pt returned call and all results discussed. Pt will call the office in 6 months to repeat labs at that time.

## 2017-10-16 ENCOUNTER — OFFICE VISIT (OUTPATIENT)
Dept: MEDICAL GROUP | Facility: MEDICAL CENTER | Age: 62
End: 2017-10-16
Payer: COMMERCIAL

## 2017-10-16 VITALS
HEIGHT: 72 IN | BODY MASS INDEX: 35.08 KG/M2 | TEMPERATURE: 98.2 F | OXYGEN SATURATION: 98 % | HEART RATE: 52 BPM | SYSTOLIC BLOOD PRESSURE: 130 MMHG | DIASTOLIC BLOOD PRESSURE: 82 MMHG | WEIGHT: 259 LBS

## 2017-10-16 DIAGNOSIS — G89.29 CHRONIC LEFT SHOULDER PAIN: ICD-10-CM

## 2017-10-16 DIAGNOSIS — M25.512 CHRONIC LEFT SHOULDER PAIN: ICD-10-CM

## 2017-10-16 DIAGNOSIS — F41.9 ANXIETY: ICD-10-CM

## 2017-10-16 DIAGNOSIS — Z00.00 PREVENTATIVE HEALTH CARE: Chronic | ICD-10-CM

## 2017-10-16 PROCEDURE — 20610 DRAIN/INJ JOINT/BURSA W/O US: CPT | Performed by: INTERNAL MEDICINE

## 2018-01-09 ENCOUNTER — OFFICE VISIT (OUTPATIENT)
Dept: URGENT CARE | Facility: CLINIC | Age: 63
End: 2018-01-09
Payer: COMMERCIAL

## 2018-01-09 VITALS
WEIGHT: 258.3 LBS | OXYGEN SATURATION: 93 % | DIASTOLIC BLOOD PRESSURE: 88 MMHG | HEIGHT: 72 IN | BODY MASS INDEX: 34.98 KG/M2 | RESPIRATION RATE: 16 BRPM | SYSTOLIC BLOOD PRESSURE: 150 MMHG | TEMPERATURE: 98.3 F | HEART RATE: 89 BPM

## 2018-01-09 DIAGNOSIS — H66.002 ACUTE SUPPURATIVE OTITIS MEDIA OF LEFT EAR WITHOUT SPONTANEOUS RUPTURE OF TYMPANIC MEMBRANE, RECURRENCE NOT SPECIFIED: ICD-10-CM

## 2018-01-09 DIAGNOSIS — J06.9 URI WITH COUGH AND CONGESTION: ICD-10-CM

## 2018-01-09 PROCEDURE — 99214 OFFICE O/P EST MOD 30 MIN: CPT | Performed by: NURSE PRACTITIONER

## 2018-01-09 RX ORDER — AMOXICILLIN AND CLAVULANATE POTASSIUM 875; 125 MG/1; MG/1
1 TABLET, FILM COATED ORAL 2 TIMES DAILY
Qty: 10 TAB | Refills: 0 | Status: SHIPPED | OUTPATIENT
Start: 2018-01-09 | End: 2018-01-14

## 2018-01-09 RX ORDER — BENZONATATE 100 MG/1
100 CAPSULE ORAL 3 TIMES DAILY PRN
Qty: 15 CAP | Refills: 0 | Status: SHIPPED | OUTPATIENT
Start: 2018-01-09 | End: 2018-01-14

## 2018-01-09 ASSESSMENT — ENCOUNTER SYMPTOMS
FEVER: 0
COUGH: 1
SORE THROAT: 0
SPUTUM PRODUCTION: 0
MYALGIAS: 0
CHILLS: 0
WHEEZING: 0
DIAPHORESIS: 0
SHORTNESS OF BREATH: 0
HEMOPTYSIS: 0
WEAKNESS: 0
SINUS PAIN: 0

## 2018-01-09 NOTE — PROGRESS NOTES
Subjective:      Luan Schmidt is a 62 y.o. male who presents with Cough (cough/ nasal congestion/ fatigue since Friday )            Patient come sin today with a 5 day history of URI symptoms including ear fullness, nasal congestion, cough and fatigue.  No fever.  He had recurrent OM as a child, but not as an adult.  No shortness of breath or chest tightness.          Review of Systems   Constitutional: Positive for malaise/fatigue. Negative for chills, diaphoresis and fever.   HENT: Positive for congestion and ear pain. Negative for ear discharge, sinus pain and sore throat.    Respiratory: Positive for cough. Negative for hemoptysis, sputum production, shortness of breath and wheezing.    Cardiovascular: Negative for chest pain.   Musculoskeletal: Negative for myalgias.   Skin: Negative for rash.   Neurological: Negative for weakness.     Medications, Allergies, and current problem list reviewed today in Epic       Objective:     /88   Pulse 89   Temp 36.8 °C (98.3 °F)   Resp 16   Ht 1.829 m (6')   Wt 117.2 kg (258 lb 4.8 oz)   SpO2 93%   BMI 35.03 kg/m²      Physical Exam   Constitutional: He is oriented to person, place, and time. He appears well-developed and well-nourished. No distress.   HENT:   Head: Normocephalic.   Right Ear: External ear normal.   Mouth/Throat: Oropharynx is clear and moist. No oropharyngeal exudate.   Nares patent but congested.  NO sinus TTP.  Left TM is erythematous with purulence behind the drum.     Eyes: Conjunctivae are normal. Pupils are equal, round, and reactive to light. Right eye exhibits no discharge. Left eye exhibits no discharge. No scleral icterus.   Neck: Neck supple. No JVD present. No tracheal deviation present. No thyromegaly present.   Cardiovascular: Normal rate, regular rhythm and normal heart sounds.  Exam reveals no gallop and no friction rub.    No murmur heard.  Pulmonary/Chest: Effort normal and breath sounds normal. No stridor. No respiratory  distress. He has no wheezes. He has no rales. He exhibits no tenderness.   Frequent dry cough in clinic.   Musculoskeletal: He exhibits no edema.   Lymphadenopathy:     He has no cervical adenopathy.   Neurological: He is alert and oriented to person, place, and time.   Skin: Skin is warm and dry. He is not diaphoretic. No erythema. No pallor.   Vitals reviewed.              Assessment/Plan:     1. Acute suppurative otitis media of left ear without spontaneous rupture of tympanic membrane, recurrence not specified  - amoxicillin-clavulanate (AUGMENTIN) 875-125 MG Tab; Take 1 Tab by mouth 2 times a day for 5 days.  Dispense: 10 Tab; Refill: 0    2. URI with cough and congestion  - benzonatate (TESSALON) 100 MG Cap; Take 1 Cap by mouth 3 times a day as needed for up to 5 days.  Dispense: 15 Cap; Refill: 0    Take full course of antibiotics.  Tessalon as prescribed.    OTC NSAIDs or tylenol prn fever, pain.  OTC oral decongestants prn symptom management.  Maintain adequate po hydration.  RTC in 5-7 days if symptoms persist, sooner if worse.  Patient verbalized understanding of and agreed with plan of care.

## 2018-01-18 ENCOUNTER — OFFICE VISIT (OUTPATIENT)
Dept: MEDICAL GROUP | Facility: MEDICAL CENTER | Age: 63
End: 2018-01-18
Payer: COMMERCIAL

## 2018-01-18 VITALS
WEIGHT: 258 LBS | OXYGEN SATURATION: 97 % | BODY MASS INDEX: 34.95 KG/M2 | SYSTOLIC BLOOD PRESSURE: 138 MMHG | HEART RATE: 57 BPM | HEIGHT: 72 IN | DIASTOLIC BLOOD PRESSURE: 90 MMHG | TEMPERATURE: 98.4 F

## 2018-01-18 DIAGNOSIS — E66.9 OBESITY (BMI 30-39.9): ICD-10-CM

## 2018-01-18 DIAGNOSIS — R13.10 DYSPHAGIA, UNSPECIFIED TYPE: ICD-10-CM

## 2018-01-18 DIAGNOSIS — R06.00 DYSPNEA, UNSPECIFIED TYPE: ICD-10-CM

## 2018-01-18 DIAGNOSIS — Z12.11 COLON CANCER SCREENING: ICD-10-CM

## 2018-01-18 DIAGNOSIS — I15.9 SECONDARY HYPERTENSION: Chronic | ICD-10-CM

## 2018-01-18 DIAGNOSIS — E03.9 HYPOTHYROIDISM, UNSPECIFIED TYPE: Chronic | ICD-10-CM

## 2018-01-18 PROCEDURE — 99214 OFFICE O/P EST MOD 30 MIN: CPT | Performed by: INTERNAL MEDICINE

## 2018-01-19 NOTE — PROGRESS NOTES
Subjective:      Luan Schmidt is a 62 y.o. male who presents with Orders Needed (Labs)            HPI     Has had some sob with walking starting three weeks ago, did 4 mile walk and did notice more sob during the walk at times, has not exercised since then, no associated chest pain, no palpitations, lightheadedness, subsequently developed upper respiratory tract symptoms and bronchitis, seen urgent care 1/9/18 for cough and given augmentin and completed the course of the augmentin.  No current chest pain , no current shortness of breath, no cough, symptoms improved with Augmentin with regards to upper respiratory tract infection  Hypertension on lisinopril and Toprol, blood pressure has been stable  Dyslipidemia on Lipitor no muscle pain related to medication  Hypothyroid on replacement Synthroid 175 µg 6 days a week, most recent TSH in October  Occasional difficulty with swallowing food, no nausea or emesis, some regurgitation, has happened about 10-12 times, no history of aspiration, no nausea or vomiting with meals  Anxiety stable on Zoloft dose 75 mg no depression      Current Outpatient Prescriptions   Medication Sig Dispense Refill   • lisinopril (PRINIVIL) 10 MG Tab Take 1 Tab by mouth every day. 90 Tab 3   • metoprolol SR (TOPROL XL) 100 MG TABLET SR 24 HR Take 1 Tab by mouth every day. 90 Tab 3   • levothyroxine (SYNTHROID) 175 MCG Tab Take one by mouth 6 days per week 78 Tab 3   • sertraline (ZOLOFT) 50 MG Tab Take 1.5 Tabs by mouth every day. 135 Tab 3   • tadalafil (CIALIS) 5 MG tablet Take 1 Tab by mouth every day. 90 Tab 3   • zolpidem (AMBIEN) 10 MG Tab Take 1 Tab by mouth at bedtime as needed for Sleep. 90 Each 1   • atorvastatin (LIPITOR) 20 MG Tab Take 1 Tab by mouth every day. 90 Tab 3   • aspirin (ASA) 81 MG CHEW Take 1 Tab by mouth every day. 100 Each 11     No current facility-administered medications for this visit.      Patient Active Problem List   Diagnosis   • Hypothyroid   •  Dyslipidemia   • Rosacea   • Hypertension   • History of gout   • S/P appendectomy   • Preventative health care   • Erectile dysfunction   • History of hearing loss   • Peripheral neuropathy (CMS-HCC)   • Obesity (BMI 30-39.9)   • Insomnia   • Renal cyst   • Dyspepsia   • Anxiety         Anxiety  8/19/16 RONALD score 10, PHQ 4, start zoloft 50 mg, declines psychotherapy  10/16/17 on zoloft 75 mg      Dyslipidemia  3/09 chol  299, trig 367,hdl 47, ldl 179 started on simcor no followup testing   6/10 chol 278,trig 366,hdl 51,ldl 154 add fish oil 2 pills bid, will not increase simcor due to hx gout  4/11 off simcor  4/11 chol 215,trig 232,hdl 39,ldl 130 off meds  12/31/11 chol 245,trig 240,hdl 48,ldl 149 off meds  10/12 chol 268,trig 239,hdl 54,ldl 166 start fish oil two pills daily  11/29/13 chol 265,trig 229,hdl 46,ldl 168  12/19/13 start lipitor 20 mg  3/27/14 chol 190,trig 164,hdl 51,ldl 106 on lipitor 20 mg  8/29/14 chol 208,trig 280,hdl 51,ldl 101 on lipitor 20 mg  10/15/15 chol 187,trig 223,hdl 46,ldl 96 on lipitor 20 mg  9/16/16 chol 182,trig 262,hdl 46,ldl 84 on lipitor 20 mg  10/4/17 chol 221,trig 324,hdl 51,ldl 105 on lipitor 20 mg     Dyspepsia  10/9/15 dyspepsia, chest x-ray, ultrasound, stress echo pending  10/29/15 ultrasound enlarged liver increased echotexture fatty infiltration or hepatocellular disease   11/31/15 TSH ordered for next month and labs for peripheral neuropathy feet, declines MRI lumbar spine repeat EMG NCS for now, cont limit etoh  11/19/15 stress echo mild concentric LVH on baseline echo, excellent exercise tolerance, negative stress echo for ischemia, brenna protocol 11 minutes 48 seconds  11/19/15 cxr negative     Erectile dysfunction  12/20/11 sample cialis 10 mg and 5 mg  3/17/14 change cialis to 2.5 mg cost  8/26/14 samples cialis 5 mg  9/16/16 testosterone 234      History of gout  On no medication for over 2 years  6/10 uric 9.6 monitor for gout flare up since on simcor  4/18/11  left elbow pain question gout flareup  4/11 uric 7.1  12/11 uric 7.0  1/17/12 gout flare up  10/12 uric 7.8  11/29/13 uric 7.9  8/29/14 uric 7.9  10/15/15 uric 8.8  9/16/16 uric 7.7  10/4/17 uric 8.7     Hearing loss  9/12  audiology note mild high-frequency sensorineural loss right ear, brainstem response normal     Hypertension  6/14/10 add lisinopril 10 mg to toprol  mg  3/15/13 treadmill thallium, treadmill component 10 minutes 48 seconds 1 mm upsloping ST segment depression nondiagnostic, hypertensive response to exercise; ejection fraction 61%, no ischemia noted  11/29/13 urine mac < 2.4 on lisinopril 10 mg and toprol  mg  10/15/15 urine mac <0.5 on lisinopril 10 mg and toprol  mg  11/19/15 stress echo mild concentric LVH on baseline echo, excellent exercise tolerance, negative stress echo for ischemia, brenna protocol 11 minutes 48 seconds  9/16/16 urine mac<0.7 on lisinopril 10 mg and toprol  mg  10/4/17 urine mac 8.7 on lisinopril 10 mg and toprol  mg     Hypothyroid  3/09 tsh 7.5 at that time had been on 0.2 mg half a tablet plus 0.125 for a total of 0.225 daily, this was changed to 0.2 mg one tablet po every Tuesday Thursday Saturday Sunday and 0.2 mg 1-1/2 tablets on Monday Wednesday Friday  6/10 tsh 0.148  6/23/10 change to levothyroxine 0.2 daily repeat in 6 weeks  8/27/10 tsh 2.7   4/11 tsh 0.6 on levothyroxine 200 mcg  10/12 tsh 27 on levothyroxine 200 mcg but not taking regularly  11/29/13 tsh 8.6 on levothyroxine 200 mcg daily  12/19/13 take levothyroxine 200 mg before eating  3/27/14 tsh 0.8 on levothyroxine 200 mcg  8/29/14 tsh 8.3 on levothyroxine 200 mcg taking thyroid med with supplements in am he will try to change to taking medication separate from everything, repeat labs 6 weeks  10/15/15 tsh 0.12 on levothyroxine 200 mcg daily change to levothyroxine 175 mcg  3/23/16 tsh 1.1 on levothyroxine 175 mcg  9/16/16 tsh 0.1 on levothyroxine 175 mcg change to  one po six days per week, repeat tsh 6 weeks  1/30/17 tsh 1.3 on levothyroxine 175 mcg six days per week  10/4/17 tsh 2.1 on levothyroxine 175 mcg six days per week     Insomnia  8/26/14 trial ambien 10 mg  9/7/17 continues on ambien, declines sleep psychology evaluation, work on taper     Peripheral neuropathy  10/27/12 right foot neuropathy labs tsh 27, MAXIMUS,SPEP,folate,B12,B1,ESR negative, if negative consider EMG NCS  10/29/12 pt states not taking levothyroxine 200 mcg reg, will start daily and repeat labs tsh and lipid in 6 weeks  12/24/12 no improvement in symptoms when taking levothyroxine regularly, has decreased etoh  2/13 EMG NCS normal motor and sensory nerve conduction studies bilateral lower extremities     Preventive health  12/30/11 tdap  2/29/12 colon per [facility] repeat in 10 yrs  8/26/14 zoster vaccine  10/9/15 declines prostate and rectal exam  10/4/17 psa 0.6  10/4/17 vit d 30 started 12781 weekly     Renal cyst  10/29/15 ultrasound enlarged liver increased echotexture fatty infiltration or hepatocellular disease, 3.2 cm probable complicated cystic exophytic lesion right kidney  11/30/15 MRI abdomen with and without; posterior lateral exophytic right kidney lesion 2.6 x 1.8 x 2.9 cm, no internal enhancement, consistent with benign cyst, also incidental exophytic 13 mm right renal cyst, no followup is necessary              Health Maintenance Summary                IMM DTaP/Tdap/Td Vaccine Next Due 12/30/2021      Done 12/30/2011 Imm Admin: Tdap Vaccine    COLONOSCOPY Next Due 2/28/2022      Done 2/29/2012           Patient Care Team:  Luis E Rutherford M.D. as PCP - General      ROS       Objective:     /90   Pulse (!) 57   Temp 36.9 °C (98.4 °F)   Ht 1.829 m (6')   Wt 117 kg (258 lb)   SpO2 97%   BMI 34.99 kg/m²      Physical Exam   Constitutional: He appears well-developed and well-nourished. No distress.   HENT:   Head: Normocephalic and atraumatic.   Right Ear: External ear normal.    Left Ear: External ear normal.   Mouth/Throat: Oropharynx is clear and moist.   Eyes: Conjunctivae are normal. Right eye exhibits no discharge. Left eye exhibits no discharge. No scleral icterus.   Neck: No JVD present. No thyromegaly (  ) present.   Cardiovascular: Normal rate and regular rhythm.    No murmur heard.  Pulmonary/Chest: Effort normal and breath sounds normal. No respiratory distress. He has no wheezes.   Abdominal: He exhibits no distension.   Neurological: He is alert.   Skin: Skin is warm. He is not diaphoretic.   Psychiatric: He has a normal mood and affect. His behavior is normal.   Nursing note and vitals reviewed.              Assessment/Plan:     Assessment  #1 episode of shortness of breath with walking 4 miles, occurred right before he developed upper respiratory tract infection bronchitis, possibly related to the upper respiratory tract infection, but does have risk factors cardiovascular disease such as hypertension, dyslipidemia, obesity, did have stress echo 2 years ago negative for ischemia     #2 hypertension as been controlled on lisinopril and Toprol    #3 dyslipidemia, on Lipitor no muscle pain or muscle aches with statins therapy    #4 hypothyroid remains on Synthroid 175 µg 6 days per week    #5 dysphagia occasional food sticking and difficulty swallowing    #6 obesity BMI 34.9    #7 resolved upper respiratory tract infection complete Augmentin through urgent care, currently afebrile, good breath sounds on auscultation, normal saturation room air    Plan  #1 EKG    #2 chest x-ray    #3 treadmill stress test on beta blocker    #4 FIT    #5 refer GI difficulty swallowing    #6 importance of diet, exercise, weight loss discussed, work on lifestyle modification, has declined nutrition counseling, understands what he needs to do for his diet    #7 no further antibiotics were resolved upper respiratory tract infection    #8 continue check blood pressure regularly and record    #9  continue Zoloft 75 mg    #10 no tobacco, no alcohol as can contribute to weight gain, dyslipidemia, high blood pressure

## 2018-02-08 ENCOUNTER — NON-PROVIDER VISIT (OUTPATIENT)
Dept: CARDIOLOGY | Facility: MEDICAL CENTER | Age: 63
End: 2018-02-08
Payer: COMMERCIAL

## 2018-02-08 ENCOUNTER — TELEPHONE (OUTPATIENT)
Dept: MEDICAL GROUP | Facility: MEDICAL CENTER | Age: 63
End: 2018-02-08

## 2018-02-08 VITALS
HEART RATE: 72 BPM | WEIGHT: 245 LBS | BODY MASS INDEX: 34.3 KG/M2 | HEIGHT: 71 IN | OXYGEN SATURATION: 95 % | SYSTOLIC BLOOD PRESSURE: 152 MMHG | DIASTOLIC BLOOD PRESSURE: 80 MMHG

## 2018-02-08 DIAGNOSIS — R10.13 DYSPEPSIA: ICD-10-CM

## 2018-02-08 LAB — TREADMILL STRESS: NORMAL

## 2018-02-08 PROCEDURE — 93015 CV STRESS TEST SUPVJ I&R: CPT | Performed by: INTERNAL MEDICINE

## 2018-02-17 ENCOUNTER — OFFICE VISIT (OUTPATIENT)
Dept: URGENT CARE | Facility: PHYSICIAN GROUP | Age: 63
End: 2018-02-17
Payer: COMMERCIAL

## 2018-02-17 VITALS
WEIGHT: 253.6 LBS | SYSTOLIC BLOOD PRESSURE: 120 MMHG | HEIGHT: 71 IN | TEMPERATURE: 98.9 F | BODY MASS INDEX: 35.5 KG/M2 | HEART RATE: 83 BPM | DIASTOLIC BLOOD PRESSURE: 80 MMHG | OXYGEN SATURATION: 93 %

## 2018-02-17 DIAGNOSIS — J22 ACUTE LOWER RESPIRATORY INFECTION: Primary | ICD-10-CM

## 2018-02-17 PROCEDURE — 99214 OFFICE O/P EST MOD 30 MIN: CPT | Performed by: NURSE PRACTITIONER

## 2018-02-17 RX ORDER — CODEINE PHOSPHATE AND GUAIFENESIN 10; 100 MG/5ML; MG/5ML
5-10 SOLUTION ORAL EVERY 6 HOURS PRN
Qty: 120 ML | Refills: 0 | Status: SHIPPED | OUTPATIENT
Start: 2018-02-17 | End: 2018-02-20

## 2018-02-17 RX ORDER — CEFDINIR 300 MG/1
300 CAPSULE ORAL 2 TIMES DAILY
Qty: 20 CAP | Refills: 0 | Status: SHIPPED | OUTPATIENT
Start: 2018-02-17 | End: 2018-08-05

## 2018-02-17 ASSESSMENT — ENCOUNTER SYMPTOMS
SORE THROAT: 0
WEAKNESS: 0
CHILLS: 0
NAUSEA: 0
SPUTUM PRODUCTION: 1
VOMITING: 0
SHORTNESS OF BREATH: 0
MYALGIAS: 0
FEVER: 0
DIARRHEA: 0
RHINORRHEA: 0
HEADACHES: 0
COUGH: 1

## 2018-02-17 NOTE — PROGRESS NOTES
"Subjective:      Luan cShmidt is a 62 y.o. male who presents with Cough (chest congestion, wheezing, x3 days)            Medications, Allergies and Prior Medical Hx reviewed and updated in [facility].with patient/family today         Cough   This is a new problem. The current episode started in the past 7 days (4 days). The problem has been gradually worsening. The cough is productive of sputum. Associated symptoms include chest pain (with cough). Pertinent negatives include no chills, ear pain, fever, headaches, myalgias, nasal congestion, rhinorrhea, sore throat or shortness of breath. He has tried OTC cough suppressant for the symptoms. The treatment provided no relief. There is no history of asthma, bronchitis, emphysema or pneumonia.       Review of Systems   Constitutional: Positive for malaise/fatigue. Negative for chills and fever.   HENT: Positive for congestion. Negative for ear discharge, ear pain, rhinorrhea and sore throat.    Respiratory: Positive for cough and sputum production. Negative for shortness of breath.    Cardiovascular: Positive for chest pain (with cough).   Gastrointestinal: Negative for diarrhea, nausea and vomiting.   Musculoskeletal: Negative for myalgias.   Neurological: Negative for weakness and headaches.          Objective:     /80   Pulse 83   Temp 37.2 °C (98.9 °F)   Ht 1.803 m (5' 11\")   Wt 115 kg (253 lb 9.6 oz)   SpO2 93%   BMI 35.37 kg/m²      Physical Exam   Constitutional: He appears well-developed and well-nourished. No distress.   HENT:   Head: Normocephalic and atraumatic.   Right Ear: Tympanic membrane and ear canal normal.   Left Ear: Tympanic membrane and ear canal normal.   Nose: Rhinorrhea present.   Mouth/Throat: Uvula is midline and mucous membranes are normal. No trismus in the jaw. No uvula swelling. Posterior oropharyngeal edema and posterior oropharyngeal erythema present. No oropharyngeal exudate.   Eyes: Conjunctivae are normal. Pupils are equal, " round, and reactive to light.   Neck: Neck supple.   Cardiovascular: Normal rate, regular rhythm and normal heart sounds.    Pulmonary/Chest: Effort normal and breath sounds normal. No respiratory distress. He has no wheezes.   Lymphadenopathy:     He has cervical adenopathy.   Neurological: He is alert.   Skin: Skin is warm and dry. Capillary refill takes less than 2 seconds.   Psychiatric: He has a normal mood and affect. His behavior is normal.   Vitals reviewed.              Assessment/Plan:     1. Acute lower respiratory infection  cefdinir (OMNICEF) 300 MG Cap    guaifenesin-codeine (CHERATUSSIN AC) Solution oral solution       Do not drink alcohol, take sedating medications,  or operate machinery with this medication  [facility]  Aware,  no remarkable controlled substance prescription documentation noted.       Rest, Fluids, tylenol, ibuprofen, humidified air, and otc cough meds  Pt will go to the ER for worsening or changing symptoms as discussed, follow-up with your primary care provider or return here if not improving in 7 days   Discharge instructions discussed with pt/family who verbalize understanding and agreement with poc

## 2018-08-05 DIAGNOSIS — G47.00 INSOMNIA, UNSPECIFIED TYPE: Chronic | ICD-10-CM

## 2018-08-05 RX ORDER — ZOLPIDEM TARTRATE 10 MG/1
10 TABLET ORAL NIGHTLY PRN
Qty: 90 EACH | Refills: 0 | Status: SHIPPED
Start: 2018-08-05 | End: 2018-12-19 | Stop reason: SDUPTHER

## 2018-12-19 DIAGNOSIS — M10.9 GOUT, UNSPECIFIED CAUSE, UNSPECIFIED CHRONICITY, UNSPECIFIED SITE: ICD-10-CM

## 2018-12-19 DIAGNOSIS — G47.00 INSOMNIA, UNSPECIFIED TYPE: Chronic | ICD-10-CM

## 2018-12-19 RX ORDER — ZOLPIDEM TARTRATE 10 MG/1
10 TABLET ORAL NIGHTLY PRN
Qty: 90 EACH | Refills: 0 | Status: SHIPPED
Start: 2018-12-19 | End: 2019-07-21 | Stop reason: SDUPTHER

## 2018-12-19 RX ORDER — INDOMETHACIN 50 MG/1
50 CAPSULE ORAL 3 TIMES DAILY PRN
Qty: 90 CAP | Refills: 1 | Status: SHIPPED
Start: 2018-12-19 | End: 2019-07-20 | Stop reason: SDUPTHER

## 2019-02-06 DIAGNOSIS — I10 ESSENTIAL HYPERTENSION: ICD-10-CM

## 2019-02-06 DIAGNOSIS — E03.9 HYPOTHYROIDISM, UNSPECIFIED TYPE: ICD-10-CM

## 2019-02-06 DIAGNOSIS — R45.86 MOOD DISTURBANCE: ICD-10-CM

## 2019-02-06 RX ORDER — LISINOPRIL 10 MG/1
10 TABLET ORAL DAILY
Qty: 90 TAB | Refills: 0 | Status: SHIPPED | OUTPATIENT
Start: 2019-02-06 | End: 2019-05-26 | Stop reason: SDUPTHER

## 2019-02-06 RX ORDER — LEVOTHYROXINE SODIUM 175 UG/1
TABLET ORAL
Qty: 78 TAB | Refills: 0 | Status: SHIPPED | OUTPATIENT
Start: 2019-02-06 | End: 2019-05-26 | Stop reason: SDUPTHER

## 2019-02-06 RX ORDER — METOPROLOL SUCCINATE 100 MG/1
100 TABLET, EXTENDED RELEASE ORAL DAILY
Qty: 90 TAB | Refills: 0 | Status: SHIPPED | OUTPATIENT
Start: 2019-02-06 | End: 2019-05-26 | Stop reason: SDUPTHER

## 2019-02-06 NOTE — TELEPHONE ENCOUNTER
Please notify patient he needs to schedule his annual examination appointment, we have not seen him in over a year.

## 2019-04-07 ENCOUNTER — TELEPHONE (OUTPATIENT)
Dept: MEDICAL GROUP | Facility: MEDICAL CENTER | Age: 64
End: 2019-04-07

## 2019-04-07 RX ORDER — TADALAFIL 5 MG/1
5 TABLET ORAL DAILY
Qty: 90 TAB | Refills: 0 | Status: SHIPPED | OUTPATIENT
Start: 2019-04-07 | End: 2020-01-04 | Stop reason: SDUPTHER

## 2019-06-27 ENCOUNTER — OFFICE VISIT (OUTPATIENT)
Dept: MEDICAL GROUP | Facility: MEDICAL CENTER | Age: 64
End: 2019-06-27
Payer: COMMERCIAL

## 2019-06-27 VITALS
DIASTOLIC BLOOD PRESSURE: 82 MMHG | WEIGHT: 271 LBS | HEART RATE: 57 BPM | OXYGEN SATURATION: 95 % | TEMPERATURE: 98.2 F | HEIGHT: 71 IN | SYSTOLIC BLOOD PRESSURE: 136 MMHG | BODY MASS INDEX: 37.94 KG/M2

## 2019-06-27 DIAGNOSIS — E66.9 OBESITY (BMI 30-39.9): ICD-10-CM

## 2019-06-27 DIAGNOSIS — Z12.11 COLON CANCER SCREENING: ICD-10-CM

## 2019-06-27 DIAGNOSIS — E78.5 DYSLIPIDEMIA: Chronic | ICD-10-CM

## 2019-06-27 DIAGNOSIS — Z00.00 PREVENTATIVE HEALTH CARE: Chronic | ICD-10-CM

## 2019-06-27 DIAGNOSIS — E03.9 HYPOTHYROIDISM, UNSPECIFIED TYPE: Chronic | ICD-10-CM

## 2019-06-27 DIAGNOSIS — I15.9 SECONDARY HYPERTENSION: Chronic | ICD-10-CM

## 2019-06-27 DIAGNOSIS — Z87.39 HISTORY OF GOUT: Chronic | ICD-10-CM

## 2019-06-27 PROCEDURE — 99396 PREV VISIT EST AGE 40-64: CPT | Performed by: INTERNAL MEDICINE

## 2019-06-27 ASSESSMENT — PATIENT HEALTH QUESTIONNAIRE - PHQ9: CLINICAL INTERPRETATION OF PHQ2 SCORE: 0

## 2019-06-27 ASSESSMENT — ENCOUNTER SYMPTOMS
HEARTBURN: 0
CONSTIPATION: 0
ABDOMINAL PAIN: 0
INSOMNIA: 0
SHORTNESS OF BREATH: 0
BACK PAIN: 0
WEIGHT LOSS: 0
PALPITATIONS: 0
DEPRESSION: 0
HEADACHES: 0
DIARRHEA: 0

## 2019-06-27 NOTE — PROGRESS NOTES
Subjective:      Luan Schmidt is a 64 y.o. male who presents with annual           HPI     Annual exam   meds and allergies reviewed and updated, medical history, surgical history, social history, family history reviewed and updated  QUIQUE does treadmill one hour at a time four times per week, for the last four months, has gained some weight. , no tobacco, split bottle of wine with wife per day, no soda  Blood pressure stable and controlled on lisinopril and Toprol  Hypothyroid on Synthroid no recent TSH  Dyslipidemia on Lipitor no muscle pain or aches  History of gout no recent flareup on the same as needed        Current Outpatient Prescriptions   Medication Sig Dispense Refill   • atorvastatin (LIPITOR) 20 MG Tab Take 1 Tab by mouth every day. 90 Tab 0   • lisinopril (PRINIVIL) 10 MG Tab Take 1 Tab by mouth every day. 90 Tab 0   • metoprolol SR (TOPROL XL) 100 MG TABLET SR 24 HR Take 1 Tab by mouth every day. 90 Tab 0   • levothyroxine (SYNTHROID) 175 MCG Tab One po six days per week 90 Tab 0   • sertraline (ZOLOFT) 50 MG Tab Take 1.5 Tabs by mouth every day. 135 Tab 0   • tadalafil (CIALIS) 5 MG tablet Take 1 Tab by mouth every day. 90 Tab 0   • vitamin D, Ergocalciferol, (DRISDOL) 86858 units Cap capsule Take 1 Cap by mouth every 7 days. 12 Cap 3   • aspirin (ASA) 81 MG CHEW Take 1 Tab by mouth every day. 100 Each 11   • indomethacin (INDOCIN) 50 MG Cap Take 1 Cap by mouth 3 times a day as needed (gout). 90 Cap 1     No current facility-administered medications for this visit.           Anxiety  8/19/16 RONALD score 10, PHQ 4, start zoloft 50 mg, declines psychotherapy  10/16/17 on zoloft 75 mg      Dyslipidemia  3/09 chol  299, trig 367,hdl 47, ldl 179 started on simcor no followup testing   6/10 chol 278,trig 366,hdl 51,ldl 154 add fish oil 2 pills bid, will not increase simcor due to hx gout  4/11 off simcor  4/11 chol 215,trig 232,hdl 39,ldl 130 off meds  12/31/11 chol 245,trig 240,hdl 48,ldl 149 off  meds  10/12 chol 268,trig 239,hdl 54,ldl 166 start fish oil two pills daily  11/29/13 chol 265,trig 229,hdl 46,ldl 168  12/19/13 start lipitor 20 mg  3/27/14 chol 190,trig 164,hdl 51,ldl 106 on lipitor 20 mg  8/29/14 chol 208,trig 280,hdl 51,ldl 101 on lipitor 20 mg  10/15/15 chol 187,trig 223,hdl 46,ldl 96 on lipitor 20 mg  9/16/16 chol 182,trig 262,hdl 46,ldl 84 on lipitor 20 mg  10/4/17 chol 221,trig 324,hdl 51,ldl 105 on lipitor 20 mg     history esophagitis  10/9/15 dyspepsia, chest x-ray, ultrasound, stress echo pending  10/29/15 ultrasound enlarged liver increased echotexture fatty infiltration or hepatocellular disease   11/31/15 TSH ordered for next month and labs for peripheral neuropathy feet, declines MRI lumbar spine repeat EMG NCS for now, cont limit etoh  11/19/15 stress echo mild concentric LVH on baseline echo, excellent exercise tolerance, negative stress echo for ischemia, brenna protocol 11 minutes 48 seconds  11/19/15 cxr negative  2/28/16 [facility] consultation note, solid food dysphagia, will arrange for EGD  3/8/18 EGD per [facility] grade B esophagitis and gastritis, biopsy performed, take prilosec 20 mg daily   3/23/18 [facility] note dysphagia, recent EGD grade B esophagitis, biopsies negative, swelling has improved, did not start omeprazole, unsure if this is hypertonic UES versus cricopharyngeal achalasia or hypertrophy given lack of eosinophilic esophagitis or schatzki's ring, recommend trial omeprazole for 2 months then wean off if possible     Erectile dysfunction  12/20/11 sample cialis 10 mg and 5 mg  3/17/14 change cialis to 2.5 mg cost  8/26/14 samples cialis 5 mg  9/16/16 testosterone 234      History of gout  On no medication for over 2 years  6/10 uric 9.6 monitor for gout flare up since on simcor  4/18/11 left elbow pain question gout flareup  4/11 uric 7.1  12/11 uric 7.0  1/17/12 gout flare up  10/12 uric 7.8  11/29/13 uric 7.9  8/29/14 uric 7.9  10/15/15 uric 8.8  9/16/16 uric 7.7  10/4/17 uric  8.7     Hearing loss  9/12  audiology note mild high-frequency sensorineural loss right ear, brainstem response normal     Hypertension  6/14/10 add lisinopril 10 mg to toprol  mg  3/15/13 treadmill thallium, treadmill component 10 minutes 48 seconds 1 mm upsloping ST segment depression nondiagnostic, hypertensive response to exercise; ejection fraction 61%, no ischemia noted  11/29/13 urine mac < 2.4 on lisinopril 10 mg and toprol  mg  10/15/15 urine mac <0.5 on lisinopril 10 mg and toprol  mg  11/19/15 stress echo mild concentric LVH on baseline echo, excellent exercise tolerance, negative stress echo for ischemia, brenna protocol 11 minutes 48 seconds  9/16/16 urine mac<0.7 on lisinopril 10 mg and toprol  mg  10/4/17 urine mac 8.7 on lisinopril 10 mg and toprol  mg  2/8/18 treadmill stress test, brenna protocol 10 minutes, 12.8 METS, target heart rate achieved, no chest pain, no t-wave changes, normal stress EKG     Hypothyroid  3/09 tsh 7.5 at that time had been on 0.2 mg half a tablet plus 0.125 for a total of 0.225 daily, this was changed to 0.2 mg one tablet po every Tuesday Thursday Saturday Sunday and 0.2 mg 1-1/2 tablets on Monday Wednesday Friday  6/10 tsh 0.148  6/23/10 change to levothyroxine 0.2 daily repeat in 6 weeks  8/27/10 tsh 2.7   4/11 tsh 0.6 on levothyroxine 200 mcg  10/12 tsh 27 on levothyroxine 200 mcg but not taking regularly  11/29/13 tsh 8.6 on levothyroxine 200 mcg daily  12/19/13 take levothyroxine 200 mg before eating  3/27/14 tsh 0.8 on levothyroxine 200 mcg  8/29/14 tsh 8.3 on levothyroxine 200 mcg taking thyroid med with supplements in am he will try to change to taking medication separate from everything, repeat labs 6 weeks  10/15/15 tsh 0.12 on levothyroxine 200 mcg daily change to levothyroxine 175 mcg  3/23/16 tsh 1.1 on levothyroxine 175 mcg  9/16/16 tsh 0.1 on levothyroxine 175 mcg change to one po six days per week, repeat tsh 6  weeks  1/30/17 tsh 1.3 on levothyroxine 175 mcg six days per week  10/4/17 tsh 2.1 on levothyroxine 175 mcg six days per week     Insomnia  8/26/14 trial ambien 10 mg  9/7/17 continues on ambien, declines sleep psychology evaluation, work on taper     Peripheral neuropathy  10/27/12 right foot neuropathy labs tsh 27, MAXIMUS,SPEP,folate,B12,B1,ESR negative, if negative consider EMG NCS  10/29/12 pt states not taking levothyroxine 200 mcg reg, will start daily and repeat labs tsh and lipid in 6 weeks  12/24/12 no improvement in symptoms when taking levothyroxine regularly, has decreased etoh  2/13 EMG NCS normal motor and sensory nerve conduction studies bilateral lower extremities     Preventive health  12/30/11 tdap  2/29/12 colon per [facility] repeat in 10 yrs  8/26/14 zoster vaccine  10/9/15 declines prostate and rectal exam  10/4/17 psa 0.6  10/4/17 vit d 30 started 48403 weekly     Renal cyst  10/29/15 ultrasound enlarged liver increased echotexture fatty infiltration or hepatocellular disease, 3.2 cm probable complicated cystic exophytic lesion right kidney  11/30/15 MRI abdomen with and without; posterior lateral exophytic right kidney lesion 2.6 x 1.8 x 2.9 cm, no internal enhancement, consistent with benign cyst, also incidental exophytic 13 mm right renal cyst, no followup is necessary             Patient Active Problem List   Diagnosis   • Hypothyroid   • Dyslipidemia   • Rosacea   • Hypertension   • History of gout   • S/P appendectomy   • Preventative health care   • Erectile dysfunction   • History of hearing loss   • Peripheral neuropathy   • Obesity (BMI 30-39.9)   • Insomnia   • Renal cyst   • History of esophagitis   • Anxiety       Depression Screening    Little interest or pleasure in doing things?  0 - not at all  Feeling down, depressed , or hopeless? 0 - not at all  Patient Health Questionnaire Score: 0            Health Maintenance Summary                HEPATITIS C SCREENING Overdue 1955     IMM  "ZOSTER VACCINES Overdue 10/21/2014      Done 8/26/2014 Imm Admin: Zoster Vaccine Live (ZVL) (Zostavax)    IMM INFLUENZA Next Due 9/1/2019      Done 9/7/2017 Imm Admin: Influenza Vaccine Quad Inj (Pf)    IMM DTaP/Tdap/Td Vaccine Next Due 12/30/2021      Done 12/30/2011 Imm Admin: Tdap Vaccine    COLONOSCOPY Next Due 2/28/2022      Done 2/29/2012           Patient Care Team:  Luis E Rutherford M.D. as PCP - General        Review of Systems   Constitutional: Negative for weight loss.   Respiratory: Negative for shortness of breath.    Cardiovascular: Negative for chest pain and palpitations.   Gastrointestinal: Negative for abdominal pain, constipation, diarrhea and heartburn.   Genitourinary: Negative for frequency.   Musculoskeletal: Negative for back pain.   Skin: Negative for rash.   Neurological: Negative for headaches.   Endo/Heme/Allergies: Negative for environmental allergies.   Psychiatric/Behavioral: Negative for depression. The patient does not have insomnia.           Objective:     /82 (BP Location: Right arm, Patient Position: Sitting, BP Cuff Size: Large adult)   Pulse (!) 57   Temp 36.8 °C (98.2 °F)   Ht 1.803 m (5' 11\")   Wt 122.9 kg (271 lb)   SpO2 95%   BMI 37.80 kg/m²      Physical Exam   Constitutional: He appears well-developed and well-nourished. No distress.   HENT:   Head: Normocephalic and atraumatic.   Right Ear: External ear normal.   Left Ear: External ear normal.   Mouth/Throat: Oropharynx is clear and moist.   Eyes: Conjunctivae are normal. Right eye exhibits no discharge. Left eye exhibits no discharge. No scleral icterus.   Neck: Neck supple. No JVD present. No thyromegaly present.   Cardiovascular: Normal rate and regular rhythm.    Pulmonary/Chest: Effort normal and breath sounds normal.   Abdominal: Soft. Bowel sounds are normal. He exhibits no distension and no mass. There is no tenderness. There is no guarding.   Musculoskeletal: He exhibits no edema.   Neurological: He " is alert.   Skin: Skin is warm. He is not diaphoretic. No erythema.   Nursing note and vitals reviewed.    No carotid bruits    Lower extremity no edema, normal knee flexion and extension bilateral no crepitus    Declines rectal exam          Assessment/Plan:     Assessment  #! Annual exam    #2 BMI 37.8 has gained some weight, has been off his good nutrition program, still exercising however 4 days/week    #3 hypertension on lisinopril and Toprol blood pressure has been stable    #4 history of gout no medication currently no recent flareup takes Indocin as needed    #5 hypothyroid on replacement no recent TSH    #6 dyslipidemia on Lipitor no side effects with statin therapy    #7 preventative health  12/30/11 tdap  2/29/12 colon per [facility] repeat in 10 yrs  8/26/14 zoster vaccine  10/9/15 declines prostate and rectal exam  10/4/17 psa 0.6  10/4/17 vit d 30 started 13686 weekly  6/27/19 declines prostate and rectal exam    Plan   #1 nutrition, diet, exercise, discussed, importance of weight loss emphasized diabetes, heart disease can be result of obesity, declines nutrition consultation, he will let me know if he would like to see a nutritionist    #2 work on meal choices and meal planning, continue exercising, add weight training 1 to 2 days a week if possible    #3 labs    #4 check blood pressure daily and record    #5 cologuard    #6 annual influenza vaccine    #7 check blood pressure periodically and record    #8 decrease alcohol intake, decrease processed food intake    #9 follow up one year

## 2019-07-02 ENCOUNTER — HOSPITAL ENCOUNTER (OUTPATIENT)
Dept: LAB | Facility: MEDICAL CENTER | Age: 64
End: 2019-07-02
Attending: INTERNAL MEDICINE
Payer: COMMERCIAL

## 2019-07-02 DIAGNOSIS — Z00.00 PREVENTATIVE HEALTH CARE: Chronic | ICD-10-CM

## 2019-07-02 DIAGNOSIS — Z87.39 HISTORY OF GOUT: Chronic | ICD-10-CM

## 2019-07-02 LAB
25(OH)D3 SERPL-MCNC: 26 NG/ML (ref 30–100)
ALBUMIN SERPL BCP-MCNC: 4.3 G/DL (ref 3.2–4.9)
ALBUMIN/GLOB SERPL: 1.3 G/DL
ALP SERPL-CCNC: 67 U/L (ref 30–99)
ALT SERPL-CCNC: 26 U/L (ref 2–50)
ANION GAP SERPL CALC-SCNC: 8 MMOL/L (ref 0–11.9)
APPEARANCE UR: ABNORMAL
AST SERPL-CCNC: 25 U/L (ref 12–45)
BACTERIA #/AREA URNS HPF: NEGATIVE /HPF
BASOPHILS # BLD AUTO: 0.6 % (ref 0–1.8)
BASOPHILS # BLD: 0.06 K/UL (ref 0–0.12)
BILIRUB SERPL-MCNC: 0.5 MG/DL (ref 0.1–1.5)
BILIRUB UR QL STRIP.AUTO: ABNORMAL
BUN SERPL-MCNC: 28 MG/DL (ref 8–22)
CALCIUM SERPL-MCNC: 9.6 MG/DL (ref 8.5–10.5)
CHLORIDE SERPL-SCNC: 106 MMOL/L (ref 96–112)
CHOLEST SERPL-MCNC: 216 MG/DL (ref 100–199)
CO2 SERPL-SCNC: 24 MMOL/L (ref 20–33)
COLOR UR: YELLOW
CREAT SERPL-MCNC: 1.38 MG/DL (ref 0.5–1.4)
CREAT UR-MCNC: 344.6 MG/DL
EOSINOPHIL # BLD AUTO: 0.17 K/UL (ref 0–0.51)
EOSINOPHIL NFR BLD: 1.8 % (ref 0–6.9)
EPI CELLS #/AREA URNS HPF: NEGATIVE /HPF
ERYTHROCYTE [DISTWIDTH] IN BLOOD BY AUTOMATED COUNT: 43.6 FL (ref 35.9–50)
EST. AVERAGE GLUCOSE BLD GHB EST-MCNC: 123 MG/DL
FASTING STATUS PATIENT QL REPORTED: NORMAL
GLOBULIN SER CALC-MCNC: 3.2 G/DL (ref 1.9–3.5)
GLUCOSE SERPL-MCNC: 95 MG/DL (ref 65–99)
GLUCOSE UR STRIP.AUTO-MCNC: NEGATIVE MG/DL
HBA1C MFR BLD: 5.9 % (ref 0–5.6)
HCT VFR BLD AUTO: 45.7 % (ref 42–52)
HDLC SERPL-MCNC: 41 MG/DL
HGB BLD-MCNC: 14.5 G/DL (ref 14–18)
HYALINE CASTS #/AREA URNS LPF: ABNORMAL /LPF
IMM GRANULOCYTES # BLD AUTO: 0.05 K/UL (ref 0–0.11)
IMM GRANULOCYTES NFR BLD AUTO: 0.5 % (ref 0–0.9)
KETONES UR STRIP.AUTO-MCNC: NEGATIVE MG/DL
LDLC SERPL CALC-MCNC: 113 MG/DL
LEUKOCYTE ESTERASE UR QL STRIP.AUTO: NEGATIVE
LYMPHOCYTES # BLD AUTO: 2.03 K/UL (ref 1–4.8)
LYMPHOCYTES NFR BLD: 21.3 % (ref 22–41)
MCH RBC QN AUTO: 30.2 PG (ref 27–33)
MCHC RBC AUTO-ENTMCNC: 31.7 G/DL (ref 33.7–35.3)
MCV RBC AUTO: 95.2 FL (ref 81.4–97.8)
MICRO URNS: ABNORMAL
MICROALBUMIN UR-MCNC: 0.7 MG/DL
MICROALBUMIN/CREAT UR: 2 MG/G (ref 0–30)
MONOCYTES # BLD AUTO: 0.71 K/UL (ref 0–0.85)
MONOCYTES NFR BLD AUTO: 7.4 % (ref 0–13.4)
NEUTROPHILS # BLD AUTO: 6.52 K/UL (ref 1.82–7.42)
NEUTROPHILS NFR BLD: 68.4 % (ref 44–72)
NITRITE UR QL STRIP.AUTO: NEGATIVE
NRBC # BLD AUTO: 0 K/UL
NRBC BLD-RTO: 0 /100 WBC
PH UR STRIP.AUTO: 5.5 [PH]
PLATELET # BLD AUTO: 207 K/UL (ref 164–446)
PMV BLD AUTO: 10.8 FL (ref 9–12.9)
POTASSIUM SERPL-SCNC: 5.3 MMOL/L (ref 3.6–5.5)
PROT SERPL-MCNC: 7.5 G/DL (ref 6–8.2)
PROT UR QL STRIP: NEGATIVE MG/DL
PSA SERPL-MCNC: 0.4 NG/ML (ref 0–4)
RBC # BLD AUTO: 4.8 M/UL (ref 4.7–6.1)
RBC # URNS HPF: ABNORMAL /HPF
RBC UR QL AUTO: NEGATIVE
SODIUM SERPL-SCNC: 138 MMOL/L (ref 135–145)
SP GR UR STRIP.AUTO: >=1.03
TRIGL SERPL-MCNC: 312 MG/DL (ref 0–149)
TSH SERPL DL<=0.005 MIU/L-ACNC: 8.61 UIU/ML (ref 0.38–5.33)
URATE SERPL-MCNC: 9.4 MG/DL (ref 2.5–8.3)
UROBILINOGEN UR STRIP.AUTO-MCNC: 0.2 MG/DL
WBC # BLD AUTO: 9.5 K/UL (ref 4.8–10.8)
WBC #/AREA URNS HPF: ABNORMAL /HPF

## 2019-07-02 PROCEDURE — 84443 ASSAY THYROID STIM HORMONE: CPT

## 2019-07-02 PROCEDURE — 84153 ASSAY OF PSA TOTAL: CPT

## 2019-07-02 PROCEDURE — 84550 ASSAY OF BLOOD/URIC ACID: CPT

## 2019-07-02 PROCEDURE — 82043 UR ALBUMIN QUANTITATIVE: CPT

## 2019-07-02 PROCEDURE — 36415 COLL VENOUS BLD VENIPUNCTURE: CPT

## 2019-07-02 PROCEDURE — 82306 VITAMIN D 25 HYDROXY: CPT

## 2019-07-02 PROCEDURE — 80061 LIPID PANEL: CPT

## 2019-07-02 PROCEDURE — 82570 ASSAY OF URINE CREATININE: CPT

## 2019-07-02 PROCEDURE — 81001 URINALYSIS AUTO W/SCOPE: CPT

## 2019-07-02 PROCEDURE — 85025 COMPLETE CBC W/AUTO DIFF WBC: CPT

## 2019-07-02 PROCEDURE — 83036 HEMOGLOBIN GLYCOSYLATED A1C: CPT

## 2019-07-02 PROCEDURE — 80053 COMPREHEN METABOLIC PANEL: CPT

## 2019-07-03 PROBLEM — R73.09 IMPAIRED GLUCOSE METABOLISM: Status: ACTIVE | Noted: 2019-07-03

## 2019-07-07 ENCOUNTER — TELEPHONE (OUTPATIENT)
Dept: MEDICAL GROUP | Facility: MEDICAL CENTER | Age: 64
End: 2019-07-07

## 2019-07-08 ENCOUNTER — TELEPHONE (OUTPATIENT)
Dept: MEDICAL GROUP | Facility: MEDICAL CENTER | Age: 64
End: 2019-07-08

## 2019-07-08 DIAGNOSIS — E78.5 DYSLIPIDEMIA: Chronic | ICD-10-CM

## 2019-07-08 DIAGNOSIS — E03.9 HYPOTHYROIDISM, UNSPECIFIED TYPE: ICD-10-CM

## 2019-07-08 DIAGNOSIS — Z87.39 HISTORY OF GOUT: Chronic | ICD-10-CM

## 2019-07-08 DIAGNOSIS — N18.30 STAGE 3 CHRONIC KIDNEY DISEASE (HCC): ICD-10-CM

## 2019-07-08 RX ORDER — LEVOTHYROXINE SODIUM 175 UG/1
175 TABLET ORAL
Qty: 90 TAB | Refills: 3
Start: 2019-07-08 | End: 2020-10-12

## 2019-07-21 DIAGNOSIS — G47.00 INSOMNIA, UNSPECIFIED TYPE: Chronic | ICD-10-CM

## 2019-07-21 RX ORDER — ZOLPIDEM TARTRATE 10 MG/1
10 TABLET ORAL NIGHTLY PRN
Qty: 90 EACH | Refills: 1 | Status: SHIPPED
Start: 2019-07-21 | End: 2021-11-15 | Stop reason: SDUPTHER

## 2019-08-22 ENCOUNTER — TELEPHONE (OUTPATIENT)
Dept: MEDICAL GROUP | Facility: MEDICAL CENTER | Age: 64
End: 2019-08-22

## 2019-08-23 ENCOUNTER — TELEPHONE (OUTPATIENT)
Dept: MEDICAL GROUP | Facility: MEDICAL CENTER | Age: 64
End: 2019-08-23

## 2019-08-23 NOTE — TELEPHONE ENCOUNTER
----- Message from Luis E Rutherford M.D. sent at 8/22/2019  6:44 PM PDT -----  Please notify patient his Cologuard stool test is negative

## 2019-08-23 NOTE — TELEPHONE ENCOUNTER
1. Caller Name: Luan                                           Call Back Number: [phone] (home)         Patient approves a detailed voicemail message: N\A    TRUONG for pt to call office at [phone]

## 2019-09-07 ENCOUNTER — TELEPHONE (OUTPATIENT)
Dept: MEDICAL GROUP | Facility: MEDICAL CENTER | Age: 64
End: 2019-09-07

## 2019-09-07 DIAGNOSIS — E03.9 HYPOTHYROIDISM, UNSPECIFIED TYPE: ICD-10-CM

## 2019-09-07 DIAGNOSIS — E78.5 DYSLIPIDEMIA: ICD-10-CM

## 2019-09-07 DIAGNOSIS — I10 ESSENTIAL HYPERTENSION: ICD-10-CM

## 2019-09-07 DIAGNOSIS — R45.86 MOOD DISTURBANCE: ICD-10-CM

## 2019-09-07 RX ORDER — LISINOPRIL 10 MG/1
10 TABLET ORAL DAILY
Qty: 90 TAB | Refills: 3 | Status: SHIPPED | OUTPATIENT
Start: 2019-09-07 | End: 2019-12-03

## 2019-09-07 RX ORDER — LEVOTHYROXINE SODIUM 175 UG/1
175 TABLET ORAL DAILY
Qty: 90 TAB | Refills: 3 | Status: SHIPPED | OUTPATIENT
Start: 2019-09-07 | End: 2019-12-03

## 2019-09-07 RX ORDER — METOPROLOL SUCCINATE 100 MG/1
100 TABLET, EXTENDED RELEASE ORAL DAILY
Qty: 90 TAB | Refills: 3 | Status: SHIPPED | OUTPATIENT
Start: 2019-09-07 | End: 2020-10-12

## 2019-09-07 RX ORDER — ATORVASTATIN CALCIUM 20 MG/1
20 TABLET, FILM COATED ORAL DAILY
Qty: 90 TAB | Refills: 3 | Status: SHIPPED | OUTPATIENT
Start: 2019-09-07 | End: 2020-10-12

## 2019-09-07 NOTE — TELEPHONE ENCOUNTER
Please notify the patient that he is due for his repeat thyroid blood test since he is taking the medication daily.  The order is already in the computer system.

## 2019-09-13 ENCOUNTER — OFFICE VISIT (OUTPATIENT)
Dept: URGENT CARE | Facility: CLINIC | Age: 64
End: 2019-09-13
Payer: COMMERCIAL

## 2019-09-13 VITALS
HEIGHT: 71 IN | DIASTOLIC BLOOD PRESSURE: 82 MMHG | OXYGEN SATURATION: 97 % | TEMPERATURE: 97.6 F | BODY MASS INDEX: 37.66 KG/M2 | RESPIRATION RATE: 14 BRPM | HEART RATE: 70 BPM | WEIGHT: 269 LBS | SYSTOLIC BLOOD PRESSURE: 142 MMHG

## 2019-09-13 DIAGNOSIS — R51.9 ACUTE INTRACTABLE HEADACHE, UNSPECIFIED HEADACHE TYPE: ICD-10-CM

## 2019-09-13 PROCEDURE — 99214 OFFICE O/P EST MOD 30 MIN: CPT | Performed by: FAMILY MEDICINE

## 2019-09-13 ASSESSMENT — ENCOUNTER SYMPTOMS
VOMITING: 0
EYE REDNESS: 0
MYALGIAS: 0
EYE DISCHARGE: 0
NAUSEA: 0
WEIGHT LOSS: 0

## 2019-09-13 NOTE — PROGRESS NOTES
"Subjective:      Luan Schmidt is a 64 y.o. male who presents with Headache and Sinus Problem            5-6 week right parietal and frontal. Waxes and wanes. 8/10 at worst. Improves with indocin. No vision change. No weakness. No fever. No significant nasal congestion or sinus pressure. No trauma or trigger. No eye watering. Worse at the end of the day but not necessarily triggered by time of day. No other aggravating or alleviating factors.        Review of Systems   Constitutional: Negative for malaise/fatigue and weight loss.   Eyes: Negative for discharge and redness.   Gastrointestinal: Negative for nausea and vomiting.   Musculoskeletal: Negative for joint pain and myalgias.   Skin: Negative for itching and rash.     .  Medications, Allergies, and current problem list reviewed today in Epic       Objective:     /82 (BP Location: Right arm, Patient Position: Sitting)   Pulse 70   Temp 36.4 °C (97.6 °F)   Resp 14   Ht 1.803 m (5' 11\")   Wt 122 kg (269 lb)   SpO2 97%   BMI 37.52 kg/m²      Physical Exam   Constitutional: He is oriented to person, place, and time. He appears well-developed and well-nourished. No distress.   HENT:   Head: Normocephalic and atraumatic.   Right Ear: External ear normal.   Left Ear: External ear normal.   Nose: Nose normal.   Mouth/Throat: Oropharynx is clear and moist.   No point tenderness   Eyes: Conjunctivae are normal.   Neck: Normal range of motion. Neck supple.   Cardiovascular: Normal rate, regular rhythm and normal heart sounds.   No murmur heard.  Pulmonary/Chest: Effort normal and breath sounds normal. He has no wheezes.   Neurological: He is alert and oriented to person, place, and time. He displays normal reflexes. He exhibits normal muscle tone.   Skin: Skin is warm and dry. No rash noted.               Assessment/Plan:     1. Acute intractable headache, unspecified headache type  MR-[id number]-W/O     Differential diagnosis, natural history, supportive care, " and indications for immediate follow-up discussed at length.     F/u MRI

## 2019-10-02 ENCOUNTER — HOSPITAL ENCOUNTER (OUTPATIENT)
Dept: RADIOLOGY | Facility: MEDICAL CENTER | Age: 64
End: 2019-10-02
Attending: FAMILY MEDICINE
Payer: COMMERCIAL

## 2019-10-02 DIAGNOSIS — R51.9 ACUTE INTRACTABLE HEADACHE, UNSPECIFIED HEADACHE TYPE: ICD-10-CM

## 2019-10-02 PROCEDURE — 70551 MRI BRAIN STEM W/O DYE: CPT

## 2019-12-03 ENCOUNTER — OFFICE VISIT (OUTPATIENT)
Dept: MEDICAL GROUP | Facility: MEDICAL CENTER | Age: 64
End: 2019-12-03
Payer: COMMERCIAL

## 2019-12-03 VITALS
TEMPERATURE: 97.9 F | BODY MASS INDEX: 36.16 KG/M2 | SYSTOLIC BLOOD PRESSURE: 140 MMHG | HEART RATE: 67 BPM | DIASTOLIC BLOOD PRESSURE: 88 MMHG | HEIGHT: 72 IN | OXYGEN SATURATION: 97 % | WEIGHT: 267 LBS

## 2019-12-03 DIAGNOSIS — F41.9 ANXIETY: ICD-10-CM

## 2019-12-03 DIAGNOSIS — E66.9 OBESITY (BMI 30-39.9): ICD-10-CM

## 2019-12-03 DIAGNOSIS — Z11.59 NEED FOR HEPATITIS C SCREENING TEST: ICD-10-CM

## 2019-12-03 DIAGNOSIS — Z23 NEED FOR VACCINATION: ICD-10-CM

## 2019-12-03 DIAGNOSIS — R51.9 NONINTRACTABLE HEADACHE, UNSPECIFIED CHRONICITY PATTERN, UNSPECIFIED HEADACHE TYPE: ICD-10-CM

## 2019-12-03 DIAGNOSIS — N18.9 CHRONIC KIDNEY DISEASE, UNSPECIFIED CKD STAGE: ICD-10-CM

## 2019-12-03 DIAGNOSIS — I10 ESSENTIAL HYPERTENSION: ICD-10-CM

## 2019-12-03 DIAGNOSIS — G44.209 TENSION-TYPE HEADACHE, NOT INTRACTABLE, UNSPECIFIED CHRONICITY PATTERN: ICD-10-CM

## 2019-12-03 DIAGNOSIS — E03.9 HYPOTHYROIDISM, UNSPECIFIED TYPE: Chronic | ICD-10-CM

## 2019-12-03 PROCEDURE — 99214 OFFICE O/P EST MOD 30 MIN: CPT | Mod: 25 | Performed by: INTERNAL MEDICINE

## 2019-12-03 PROCEDURE — 90686 IIV4 VACC NO PRSV 0.5 ML IM: CPT | Performed by: INTERNAL MEDICINE

## 2019-12-03 PROCEDURE — 90471 IMMUNIZATION ADMIN: CPT | Performed by: INTERNAL MEDICINE

## 2019-12-03 RX ORDER — LISINOPRIL 20 MG/1
20 TABLET ORAL DAILY
Qty: 90 TAB | Refills: 1
Start: 2019-12-03 | End: 2020-11-03

## 2019-12-03 RX ORDER — NORTRIPTYLINE HYDROCHLORIDE 10 MG/1
10 CAPSULE ORAL EVERY EVENING
Qty: 30 CAP | Refills: 3 | Status: SHIPPED | OUTPATIENT
Start: 2019-12-03 | End: 2019-12-23 | Stop reason: SDUPTHER

## 2019-12-03 NOTE — PROGRESS NOTES
Subjective:      Luan Schmidt is a 64 y.o. male who presents with headache Results            HPI   New complaint  Has had headache for six months, located top of head described as sharp in nature, but can be less intense worse with with position changes such as bending over and can be worse with coughing as well. No head trauma, no vision changes, no associated nausea. No difficulty with speech or swallowing. will only last a few seconds at a time. Has tried indocin for the headache which has helped. Seems to be more aware of headache in the evenings. Can notice the sharp headache when rolling over at night in bed. No history of head injury or concussions. No regular exercise. No history of migraine. Headache not related to food or temperature, not seem to be related to stress. No sinus congestion or pressure. No fever or chills. No sore throat, no tinnitus. Some right ear pressure that resolved  No tooth grinding, no history of temporal arteritis, no history of sleep apnea  Hypertension on toprol and lisinopril patient has noticed blood pressure has been running higher when he went to urgent care.  Not checking blood pressure on a regular basis still remains on lisinopril and Toprol, not exercising on a regular basis.  10/2/19 MRI brain negative     Current Outpatient Medications   Medication Sig Dispense Refill   • sertraline (ZOLOFT) 50 MG Tab Take 1.5 Tabs by mouth every day. 135 Tab 3   • atorvastatin (LIPITOR) 20 MG Tab Take 1 Tab by mouth every day. 90 Tab 3   • metoprolol SR (TOPROL XL) 100 MG TABLET SR 24 HR Take 1 Tab by mouth every day. 90 Tab 3   • lisinopril (PRINIVIL) 10 MG Tab Take 1 Tab by mouth every day. 90 Tab 3   • indomethacin (INDOCIN) 50 MG Cap Take 1 Cap by mouth 3 times a day as needed (gout). 270 Cap 1   • levothyroxine (SYNTHROID) 175 MCG Tab Take 1 Tab by mouth Every morning on an empty stomach. One po six days per week 90 Tab 3   • tadalafil (CIALIS) 5 MG tablet Take 1 Tab by mouth  every day. 90 Tab 0   • aspirin (ASA) 81 MG CHEW Take 1 Tab by mouth every day. 100 Each 11   • levothyroxine (SYNTHROID) 175 MCG Tab Take 1 Tab by mouth every day. Frequency change 90 Tab 3   • vitamin D, Ergocalciferol, (DRISDOL) 24906 units Cap capsule Take 1 Cap by mouth every 7 days. (Patient not taking: Reported on 12/3/2019) 12 Cap 3     No current facility-administered medications for this visit.        Anxiety  8/19/16 RONALD score 10, PHQ 4, start zoloft 50 mg, declines psychotherapy  10/16/17 on zoloft 75 mg      Dyslipidemia  3/09 chol  299, trig 367,hdl 47, ldl 179 started on simcor no followup testing   6/10 chol 278,trig 366,hdl 51,ldl 154 add fish oil 2 pills bid, will not increase simcor due to hx gout  4/11 off simcor  4/11 chol 215,trig 232,hdl 39,ldl 130 off meds  12/31/11 chol 245,trig 240,hdl 48,ldl 149 off meds  10/12 chol 268,trig 239,hdl 54,ldl 166 start fish oil two pills daily  11/29/13 chol 265,trig 229,hdl 46,ldl 168  12/19/13 start lipitor 20 mg  3/27/14 chol 190,trig 164,hdl 51,ldl 106 on lipitor 20 mg  8/29/14 chol 208,trig 280,hdl 51,ldl 101 on lipitor 20 mg  10/15/15 chol 187,trig 223,hdl 46,ldl 96 on lipitor 20 mg  9/16/16 chol 182,trig 262,hdl 46,ldl 84 on lipitor 20 mg  10/4/17 chol 221,trig 324,hdl 51,ldl 105 on lipitor 20 mg  7/2/19 chol 216,trig 312,hdl 41,ldl 113 on lipitor 20 mg     history esophagitis  10/9/15 dyspepsia, chest x-ray, ultrasound, stress echo pending  10/29/15 ultrasound enlarged liver increased echotexture fatty infiltration or hepatocellular disease   11/31/15 TSH ordered for next month and labs for peripheral neuropathy feet, declines MRI lumbar spine repeat EMG NCS for now, cont limit etoh  11/19/15 stress echo mild concentric LVH on baseline echo, excellent exercise tolerance, negative stress echo for ischemia, brenna protocol 11 minutes 48 seconds  11/19/15 cxr negative  2/28/16 [facility] consultation note, solid food dysphagia, will arrange for EGD  3/8/18 EGD  per [facility] grade B esophagitis and gastritis, biopsy performed, take prilosec 20 mg daily   3/23/18 [facility] note dysphagia, recent EGD grade B esophagitis, biopsies negative, swelling has improved, did not start omeprazole, unsure if this is hypertonic UES versus cricopharyngeal achalasia or hypertrophy given lack of eosinophilic esophagitis or schatzki's ring, recommend trial omeprazole for 2 months then wean off if possible     Erectile dysfunction  12/20/11 sample cialis 10 mg and 5 mg  3/17/14 change cialis to 2.5 mg cost  8/26/14 samples cialis 5 mg  9/16/16 testosterone 234     History of esophagitis  10/9/15 dyspepsia, chest x-ray, ultrasound, stress echo pending  10/29/15 ultrasound enlarged liver increased echotexture fatty infiltration or hepatocellular disease   11/31/15 TSH ordered for next month and labs for peripheral neuropathy feet, declines MRI lumbar spine repeat EMG NCS for now, cont limit etoh  11/19/15 stress echo mild concentric LVH on baseline echo, excellent exercise tolerance, negative stress echo for ischemia, brenna protocol 11 minutes 48 seconds  11/19/15 cxr negative  2/28/16 [facility] consultation note, solid food dysphagia, will arrange for EGD  3/8/18 EGD per [facility] grade B esophagitis and gastritis, biopsy performed, take prilosec 20 mg daily   3/23/18 [facility] note dysphagia, recent EGD grade B esophagitis, biopsies negative, swelling has improved, did not start omeprazole, unsure if this is hypertonic UES versus cricopharyngeal achalasia or hypertrophy given lack of eosinophilic esophagitis or schatzki's ring, recommend trial omeprazole for 2 months then wean off if possible     History of gout  On no medication for over 2 years  6/10 uric 9.6 monitor for gout flare up since on simcor  4/18/11 left elbow pain question gout flareup  4/11 uric 7.1  12/11 uric 7.0  1/17/12 gout flare up  10/12 uric 7.8  11/29/13 uric 7.9  8/29/14 uric 7.9  10/15/15 uric 8.8  9/16/16 uric 7.7  10/4/17 uric 8.7  7/2/19 uric  9.4  7/8/19 declines allopurinol     Hearing loss  9/12  audiology note mild high-frequency sensorineural loss right ear, brainstem response normal     Hypertension  6/14/10 add lisinopril 10 mg to toprol  mg  3/15/13 treadmill thallium, treadmill component 10 minutes 48 seconds 1 mm upsloping ST segment depression nondiagnostic, hypertensive response to exercise; ejection fraction 61%, no ischemia noted  11/29/13 urine mac < 2.4 on lisinopril 10 mg and toprol  mg  10/15/15 urine mac <0.5 on lisinopril 10 mg and toprol  mg  11/19/15 stress echo mild concentric LVH on baseline echo, excellent exercise tolerance, negative stress echo for ischemia, brenna protocol 11 minutes 48 seconds  9/16/16 urine mac<0.7 on lisinopril 10 mg and toprol  mg  10/4/17 urine mac 8.7 on lisinopril 10 mg and toprol  mg  2/8/18 treadmill stress test, brenna protocol 10 minutes, 12.8 METS, target heart rate achieved, no chest pain, no t-wave changes, normal stress EKG     Hypothyroid  3/09 tsh 7.5 at that time had been on 0.2 mg half a tablet plus 0.125 for a total of 0.225 daily, this was changed to 0.2 mg one tablet po every Tuesday Thursday Saturday Sunday and 0.2 mg 1-1/2 tablets on Monday Wednesday Friday  6/10 tsh 0.148  6/23/10 change to levothyroxine 0.2 daily repeat in 6 weeks  8/27/10 tsh 2.7   4/11 tsh 0.6 on levothyroxine 200 mcg  10/12 tsh 27 on levothyroxine 200 mcg but not taking regularly  11/29/13 tsh 8.6 on levothyroxine 200 mcg daily  12/19/13 take levothyroxine 200 mg before eating  3/27/14 tsh 0.8 on levothyroxine 200 mcg  8/29/14 tsh 8.3 on levothyroxine 200 mcg taking thyroid med with supplements in am he will try to change to taking medication separate from everything, repeat labs 6 weeks  10/15/15 tsh 0.12 on levothyroxine 200 mcg daily change to levothyroxine 175 mcg  3/23/16 tsh 1.1 on levothyroxine 175 mcg  9/16/16 tsh 0.1 on levothyroxine 175 mcg change to one po six days per  week, repeat tsh 6 weeks  1/30/17 tsh 1.3 on levothyroxine 175 mcg six days per week  10/4/17 tsh 2.1 on levothyroxine 175 mcg six days per week  7/2/19 tsh 8.6 on levothyroxine 175 mcg six days per week  7/8/19 change to levothyroxine 175 mcg daily repeat tsh 6 weeks    Impaired glucose metabolism  7/2/19 A1c 5.9%     Insomnia  8/26/14 trial ambien 10 mg  9/7/17 continues on ambien, declines sleep psychology evaluation, work on taper     Peripheral neuropathy  10/27/12 right foot neuropathy labs tsh 27, MAXIMUS,SPEP,folate,B12,B1,ESR negative, if negative consider EMG NCS  10/29/12 pt states not taking levothyroxine 200 mcg reg, will start daily and repeat labs tsh and lipid in 6 weeks  12/24/12 no improvement in symptoms when taking levothyroxine regularly, has decreased etoh  2/13 EMG NCS normal motor and sensory nerve conduction studies bilateral lower extremities     Preventive health  12/30/11 tdap  2/29/12 colon per [facility] repeat in 10 yrs  8/26/14 zoster vaccine  6/27/19 declines prostate and rectal exam  7/2/19 psa 0.4  7/2/19 vit d 26 start 4000 units  8/22/19 cologuard negative     Renal cyst  10/29/15 ultrasound enlarged liver increased echotexture fatty infiltration or hepatocellular disease, 3.2 cm probable complicated cystic exophytic lesion right kidney  11/30/15 MRI abdomen with and without; posterior lateral exophytic right kidney lesion 2.6 x 1.8 x 2.9 cm, no internal enhancement, consistent with benign cyst, also incidental exophytic 13 mm right renal cyst, no followup is necessary                  Patient Active Problem List   Diagnosis   • Hypothyroid   • Dyslipidemia   • Rosacea   • Hypertension   • History of gout   • S/P appendectomy   • Preventative health care   • Erectile dysfunction   • History of hearing loss   • Peripheral neuropathy   • Obesity (BMI 30-39.9)   • Insomnia   • Renal cyst   • History of esophagitis   • Anxiety   • Impaired glucose metabolism        Health Maintenance Summary                 HEPATITIS C SCREENING Overdue 1955     IMM ZOSTER VACCINES Overdue 10/21/2014      Done 8/26/2014 Imm Admin: Zoster Vaccine Live (ZVL) (Zostavax)    IMM DTaP/Tdap/Td Vaccine Next Due 12/30/2021      Done 12/30/2011 Imm Admin: Tdap Vaccine    COLONOSCOPY Next Due 2/28/2022      Done 2/29/2012           Patient Care Team:  Luis E Rutherford M.D. as PCP - General      ROS       Objective:     /88 (BP Location: Right arm, Patient Position: Sitting, BP Cuff Size: Large adult)   Pulse 67   Temp 36.6 °C (97.9 °F)   Ht 1.829 m (6')   Wt 121.1 kg (267 lb)   SpO2 97%   BMI 36.21 kg/m²      Physical Exam  Vitals signs and nursing note reviewed.   Constitutional:       General: He is not in acute distress.     Appearance: Normal appearance. He is not ill-appearing.   HENT:      Head: Normocephalic and atraumatic.      Right Ear: Tympanic membrane normal.      Nose: Nose normal.      Mouth/Throat:      Pharynx: No oropharyngeal exudate.   Eyes:      General: No scleral icterus.        Right eye: No discharge.         Left eye: No discharge.   Neck:      Musculoskeletal: No neck rigidity.   Cardiovascular:      Rate and Rhythm: Normal rate and regular rhythm.      Heart sounds: No murmur.   Pulmonary:      Effort: Pulmonary effort is normal. No respiratory distress.      Breath sounds: Normal breath sounds.   Abdominal:      General: There is no distension.   Musculoskeletal:         General: No swelling.   Skin:     General: Skin is warm.   Neurological:      General: No focal deficit present.      Mental Status: He is alert.   Psychiatric:         Mood and Affect: Mood normal.         Behavior: Behavior normal.       Cranial nerves intact, normal finger-to-nose, normal gait, negative Romberg  No nuchal rigidity, normal gait, no focal neurologic deficits upper lower extremities  No evidence of shingles, no spinal tenderness, no TMJ tenderness   Top of scalp is tender to palpation and we can  reproduce the discomfort, no evidence of shingles  Normal affect, insight, judgment     Assessment/Plan:     Assessment  #1 headache top of head, typically worse with position or coughing, MRI brain negative, no focal neurologic deficits, no meningeal signs, no evidence of sinusitis, no evidence of TMJ, no otitis, no sinusitis, no focal deficits, no history of temporal arteritis, no structural mass or lesions on MRI, appears to be worse with actually pressing over the scalp area to reproduce discomfort, no evidence of shingles    #2 hypertension elevated blood pressure today, has not been checking blood pressure on a regular basis, remains on lisinopril 10 mg and Toprol    #3 BMI 36.2     #4 hypothyroid on replacement levothyroxine 175 mcg daily, due for repeat thyroid test    #5 anxiety stable on Zoloft stable mood    #6 CKD BUN 20, creatinine 1.3 on 7/2/19        Plan  #! Increase lisinopril to 20 mg, continue Toprol, check blood pressure daily record, follow-up visit 2 months bring blood pressure diary, will need to start exercising on a consistent basis    #2 pamelor 10 mg every evening, can cause constipation, daytime drowsiness, no alcohol with medication, rare risk of serotonin syndrome with Zoloft, however this is a low dose, monitor for constipation, drowsiness, monitor effect, we are looking for improvement in sleep and headache symptoms, ideally we would want to decrease his Indocin dose given CKD    #3 labs repeat thyroid levels, CKD evaluation, ESR evaluation temporal arteritis    #4  MRI result reviewed, discussed with patient reassuring that this is negative for structural disease or mass    #5  Follow-up 2 months    #6 try to limit Indocin, CKD work-up in process, hopefully pamelor will be effective in decreasing the usage of Indocin

## 2019-12-23 DIAGNOSIS — R51.9 NONINTRACTABLE HEADACHE, UNSPECIFIED CHRONICITY PATTERN, UNSPECIFIED HEADACHE TYPE: ICD-10-CM

## 2019-12-23 RX ORDER — NORTRIPTYLINE HYDROCHLORIDE 10 MG/1
10 CAPSULE ORAL EVERY EVENING
Qty: 30 CAP | Refills: 3 | Status: CANCELLED | OUTPATIENT
Start: 2019-12-23

## 2019-12-23 RX ORDER — NORTRIPTYLINE HYDROCHLORIDE 10 MG/1
20 CAPSULE ORAL EVERY EVENING
Qty: 60 CAP | Refills: 3 | Status: SHIPPED
Start: 2019-12-23 | End: 2020-01-30 | Stop reason: SDUPTHER

## 2019-12-23 NOTE — TELEPHONE ENCOUNTER
Notriptaline 10 mg ( Pt would like you to either increase the dose to 20 or the # to 60 per mo.)    He is almost out but you told him to let you know if he was doubling up.     Pt would like the new Rx to go to [facility] [address]       Was the patient seen in the last year in this department? Yes    Does patient have an active prescription for medications requested? Yes    Received Request Via: Patient

## 2019-12-24 NOTE — TELEPHONE ENCOUNTER
Please notify the patient that the nortriptyline does not come in a 20 mg tablet, thus I will send a prescription to his pharmacy for the 10 mg tablets, he can take 2 tablets at night     The medication at the higher dosages sometimes will cause a daytime hangover effect, have him monitor for that.

## 2020-01-04 RX ORDER — TADALAFIL 5 MG/1
5 TABLET ORAL DAILY
Qty: 90 TAB | Refills: 3 | Status: SHIPPED | OUTPATIENT
Start: 2020-01-04 | End: 2021-06-14 | Stop reason: SDUPTHER

## 2020-01-30 ENCOUNTER — OFFICE VISIT (OUTPATIENT)
Dept: MEDICAL GROUP | Facility: MEDICAL CENTER | Age: 65
End: 2020-01-30
Payer: COMMERCIAL

## 2020-01-30 VITALS
SYSTOLIC BLOOD PRESSURE: 150 MMHG | HEIGHT: 73 IN | BODY MASS INDEX: 36.18 KG/M2 | HEART RATE: 76 BPM | WEIGHT: 273 LBS | OXYGEN SATURATION: 94 % | DIASTOLIC BLOOD PRESSURE: 92 MMHG | TEMPERATURE: 97.3 F

## 2020-01-30 DIAGNOSIS — R51.9 NONINTRACTABLE HEADACHE, UNSPECIFIED CHRONICITY PATTERN, UNSPECIFIED HEADACHE TYPE: ICD-10-CM

## 2020-01-30 PROCEDURE — 99213 OFFICE O/P EST LOW 20 MIN: CPT | Performed by: INTERNAL MEDICINE

## 2020-01-30 RX ORDER — NORTRIPTYLINE HYDROCHLORIDE 10 MG/1
10 CAPSULE ORAL EVERY EVENING
Qty: 90 CAP | Refills: 3 | Status: SHIPPED | OUTPATIENT
Start: 2020-01-30 | End: 2020-08-31

## 2020-01-30 ASSESSMENT — PATIENT HEALTH QUESTIONNAIRE - PHQ9: CLINICAL INTERPRETATION OF PHQ2 SCORE: 0

## 2020-01-30 NOTE — PROGRESS NOTES
Subjective:      Luan Schmidt is a 64 y.o. male who presents with headache Follow-Up            HPI      Here for follow up headache on pamelor, started on that last month for tension type headache, went up to 2 pills a day symptoms then improved, dropped back to 1 pill a day at night after 2 weeks, and headaches have resolved.  Did have some constipation with the Pamelor. Has been eating more processed foods as well, had one episode of abdominal pain 3 to 4 weeks ago, no constipation or diarrhea, no blood in stool, no gas or bloating, sharp pain, no blood in urine, left lower abdomen, no radiation to the back, that resolved as well.  That was last month.  Also one episode of mild tinnitus right ear which resolved, no hearing loss, no vertigo, no upper respiratory tract symptoms of sore throat, ear pain, fever.  That has not come back.  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  Has been using more indomethacin for headaches, but has cut back, uses that for gout as needed, no real Indocin usage with Pamelor being effective.      Current Outpatient Medications   Medication Sig Dispense Refill   • indomethacin (INDOCIN) 50 MG Cap Take 1 Cap by mouth 3 times a day as needed (gout). 270 Cap 0   • tadalafil (CIALIS) 5 MG tablet Take 1 Tab by mouth every day. 90 Tab 3   • nortriptyline (PAMELOR) 10 MG Cap Take 2 Caps by mouth every evening. 60 Cap 3   • lisinopril (PRINIVIL) 20 MG Tab Take 1 Tab by mouth every day. 90 Tab 1   • sertraline (ZOLOFT) 50 MG Tab Take 1.5 Tabs by mouth every day. 135 Tab 3   • atorvastatin (LIPITOR) 20 MG Tab Take 1 Tab by mouth every day. 90 Tab 3   • metoprolol SR (TOPROL XL) 100 MG TABLET SR 24 HR Take 1 Tab by mouth every day. 90 Tab 3   • levothyroxine (SYNTHROID) 175 MCG Tab Take 1 Tab by mouth Every morning on an empty stomach. One po six days per week 90 Tab 3   • vitamin D, Ergocalciferol, (DRISDOL) 38617 units Cap capsule Take 1  Cap by mouth every 7 days. (Patient not taking: Reported on 12/3/2019) 12 Cap 3   • aspirin (ASA) 81 MG CHEW Take 1 Tab by mouth every day. 100 Each 11     No current facility-administered medications for this visit.      Anxiety  8/19/16 RONALD score 10, PHQ 4, start zoloft 50 mg, declines psychotherapy  10/16/17 on zoloft 75 mg   12/3/19 on zoloft 75 mg add pamelor 10 mg for headache     Dyslipidemia  3/09 chol  299, trig 367,hdl 47, ldl 179 started on simcor no followup testing   6/10 chol 278,trig 366,hdl 51,ldl 154 add fish oil 2 pills bid, will not increase simcor due to hx gout  4/11 off simcor  4/11 chol 215,trig 232,hdl 39,ldl 130 off meds  12/31/11 chol 245,trig 240,hdl 48,ldl 149 off meds  10/12 chol 268,trig 239,hdl 54,ldl 166 start fish oil two pills daily  11/29/13 chol 265,trig 229,hdl 46,ldl 168  12/19/13 start lipitor 20 mg  3/27/14 chol 190,trig 164,hdl 51,ldl 106 on lipitor 20 mg  8/29/14 chol 208,trig 280,hdl 51,ldl 101 on lipitor 20 mg  10/15/15 chol 187,trig 223,hdl 46,ldl 96 on lipitor 20 mg  9/16/16 chol 182,trig 262,hdl 46,ldl 84 on lipitor 20 mg  10/4/17 chol 221,trig 324,hdl 51,ldl 105 on lipitor 20 mg  7/2/19 chol 216,trig 312,hdl 41,ldl 113 on lipitor 20 mg     history esophagitis  10/9/15 dyspepsia, chest x-ray, ultrasound, stress echo pending  10/29/15 ultrasound enlarged liver increased echotexture fatty infiltration or hepatocellular disease   11/31/15 TSH ordered for next month and labs for peripheral neuropathy feet, declines MRI lumbar spine repeat EMG NCS for now, cont limit etoh  11/19/15 stress echo mild concentric LVH on baseline echo, excellent exercise tolerance, negative stress echo for ischemia, brenna protocol 11 minutes 48 seconds  11/19/15 cxr negative  2/28/16 [facility] consultation note, solid food dysphagia, will arrange for EGD  3/8/18 EGD per [facility] grade B esophagitis and gastritis, biopsy performed, take prilosec 20 mg daily   3/23/18 [facility] note dysphagia, recent EGD grade B  esophagitis, biopsies negative, swelling has improved, did not start omeprazole, unsure if this is hypertonic UES versus cricopharyngeal achalasia or hypertrophy given lack of eosinophilic esophagitis or schatzki's ring, recommend trial omeprazole for 2 months then wean off if possible     Erectile dysfunction  12/20/11 sample cialis 10 mg and 5 mg  3/17/14 change cialis to 2.5 mg cost  8/26/14 samples cialis 5 mg  9/16/16 testosterone 234      History of esophagitis  10/9/15 dyspepsia, chest x-ray, ultrasound, stress echo pending  10/29/15 ultrasound enlarged liver increased echotexture fatty infiltration or hepatocellular disease   11/31/15 TSH ordered for next month and labs for peripheral neuropathy feet, declines MRI lumbar spine repeat EMG NCS for now, cont limit etoh  11/19/15 stress echo mild concentric LVH on baseline echo, excellent exercise tolerance, negative stress echo for ischemia, brenna protocol 11 minutes 48 seconds  11/19/15 cxr negative  2/28/16 [facility] consultation note, solid food dysphagia, will arrange for EGD  3/8/18 EGD per [facility] grade B esophagitis and gastritis, biopsy performed, take prilosec 20 mg daily   3/23/18 [facility] note dysphagia, recent EGD grade B esophagitis, biopsies negative, swelling has improved, did not start omeprazole, unsure if this is hypertonic UES versus cricopharyngeal achalasia or hypertrophy given lack of eosinophilic esophagitis or schatzki's ring, recommend trial omeprazole for 2 months then wean off if possible     History of gout  On no medication for over 2 years  6/10 uric 9.6 monitor for gout flare up since on simcor  4/18/11 left elbow pain question gout flareup  4/11 uric 7.1  12/11 uric 7.0  1/17/12 gout flare up  10/12 uric 7.8  11/29/13 uric 7.9  8/29/14 uric 7.9  10/15/15 uric 8.8  9/16/16 uric 7.7  10/4/17 uric 8.7  7/2/19 uric 9.4  7/8/19 declines allopurinol    headache  10/2/19 MRI brain negative  12/3/19 headache, question neuralgia, trial of pamelor 10  mg  12/23/19 increase pamelor to 20 mg     Hearing loss  9/12  audiology note mild high-frequency sensorineural loss right ear, brainstem response normal     Hypertension  6/14/10 add lisinopril 10 mg to toprol  mg  3/15/13 treadmill thallium, treadmill component 10 minutes 48 seconds 1 mm upsloping ST segment depression nondiagnostic, hypertensive response to exercise; ejection fraction 61%, no ischemia noted  11/29/13 urine mac < 2.4 on lisinopril 10 mg and toprol  mg  10/15/15 urine mac <0.5 on lisinopril 10 mg and toprol  mg  11/19/15 stress echo mild concentric LVH on baseline echo, excellent exercise tolerance, negative stress echo for ischemia, brenna protocol 11 minutes 48 seconds  9/16/16 urine mac<0.7 on lisinopril 10 mg and toprol  mg  10/4/17 urine mac 8.7 on lisinopril 10 mg and toprol  mg  2/8/18 treadmill stress test, brenna protocol 10 minutes, 12.8 METS, target heart rate achieved, no chest pain, no t-wave changes, normal stress EKG  12/3/19 increase lisinopril to 20 mg daily and continue toprol  mg     Hypothyroid  3/09 tsh 7.5 at that time had been on 0.2 mg half a tablet plus 0.125 for a total of 0.225 daily, this was changed to 0.2 mg one tablet po every Tuesday Thursday Saturday Sunday and 0.2 mg 1-1/2 tablets on Monday Wednesday Friday  6/10 tsh 0.148  6/23/10 change to levothyroxine 0.2 daily repeat in 6 weeks  8/27/10 tsh 2.7   4/11 tsh 0.6 on levothyroxine 200 mcg  10/12 tsh 27 on levothyroxine 200 mcg but not taking regularly  11/29/13 tsh 8.6 on levothyroxine 200 mcg daily  12/19/13 take levothyroxine 200 mg before eating  3/27/14 tsh 0.8 on levothyroxine 200 mcg  8/29/14 tsh 8.3 on levothyroxine 200 mcg taking thyroid med with supplements in am he will try to change to taking medication separate from everything, repeat labs 6 weeks  10/15/15 tsh 0.12 on levothyroxine 200 mcg daily change to levothyroxine 175 mcg  3/23/16 tsh 1.1 on levothyroxine  175 mcg  9/16/16 tsh 0.1 on levothyroxine 175 mcg change to one po six days per week, repeat tsh 6 weeks  1/30/17 tsh 1.3 on levothyroxine 175 mcg six days per week  10/4/17 tsh 2.1 on levothyroxine 175 mcg six days per week  7/2/19 tsh 8.6 on levothyroxine 175 mcg six days per week  7/8/19 change to levothyroxine 175 mcg daily repeat tsh 6 weeks     Impaired glucose metabolism  7/2/19 A1c 5.9%     Insomnia  8/26/14 trial ambien 10 mg  9/7/17 continues on ambien, declines sleep psychology evaluation, work on taper     Peripheral neuropathy  10/27/12 right foot neuropathy labs tsh 27, MAXIMUS,SPEP,folate,B12,B1,ESR negative, if negative consider EMG NCS  10/29/12 pt states not taking levothyroxine 200 mcg reg, will start daily and repeat labs tsh and lipid in 6 weeks  12/24/12 no improvement in symptoms when taking levothyroxine regularly, has decreased etoh  2/13 EMG NCS normal motor and sensory nerve conduction studies bilateral lower extremities     Preventive health  12/30/11 tdap  2/29/12 colon per [facility] repeat in 10 yrs  8/26/14 zoster vaccine  6/27/19 declines prostate and rectal exam  7/2/19 psa 0.4  7/2/19 vit d 26 start 4000 units  8/22/19 cologuard negative     Renal cyst  10/29/15 ultrasound enlarged liver increased echotexture fatty infiltration or hepatocellular disease, 3.2 cm probable complicated cystic exophytic lesion right kidney  11/30/15 MRI abdomen with and without; posterior lateral exophytic right kidney lesion 2.6 x 1.8 x 2.9 cm, no internal enhancement, consistent with benign cyst, also incidental exophytic 13 mm right renal cyst, no followup is necessary                Patient Active Problem List   Diagnosis   • Hypothyroid   • Dyslipidemia   • Rosacea   • Hypertension   • History of gout   • S/P appendectomy   • Preventative health care   • Erectile dysfunction   • History of hearing loss   • Peripheral neuropathy   • Obesity (BMI 30-39.9)   • Insomnia   • Renal cyst   • History of esophagitis    • Anxiety   • Impaired glucose metabolism   • Headache       Current Outpatient Medications   Medication Sig Dispense Refill   • nortriptyline (PAMELOR) 10 MG Cap Take 1 Cap by mouth every evening. 90 Cap 3   • indomethacin (INDOCIN) 50 MG Cap Take 1 Cap by mouth 3 times a day as needed (gout). 270 Cap 0   • tadalafil (CIALIS) 5 MG tablet Take 1 Tab by mouth every day. 90 Tab 3   • lisinopril (PRINIVIL) 20 MG Tab Take 1 Tab by mouth every day. 90 Tab 1   • sertraline (ZOLOFT) 50 MG Tab Take 1.5 Tabs by mouth every day. 135 Tab 3   • atorvastatin (LIPITOR) 20 MG Tab Take 1 Tab by mouth every day. 90 Tab 3   • metoprolol SR (TOPROL XL) 100 MG TABLET SR 24 HR Take 1 Tab by mouth every day. 90 Tab 3   • levothyroxine (SYNTHROID) 175 MCG Tab Take 1 Tab by mouth Every morning on an empty stomach. One po six days per week 90 Tab 3   • aspirin (ASA) 81 MG CHEW Take 1 Tab by mouth every day. 100 Each 11   • vitamin D, Ergocalciferol, (DRISDOL) 00096 units Cap capsule Take 1 Cap by mouth every 7 days. (Patient not taking: Reported on 1/30/2020) 12 Cap 3     No current facility-administered medications for this visit.      Depression Screening    Little interest or pleasure in doing things?  0 - not at all  Feeling down, depressed , or hopeless? 0 - not at all  Patient Health Questionnaire Score: 0          Health Maintenance Summary                HEPATITIS C SCREENING Overdue 1955     IMM ZOSTER VACCINES Overdue 10/21/2014      Done 8/26/2014 Imm Admin: Zoster Vaccine Live (ZVL) (Zostavax)    IMM DTaP/Tdap/Td Vaccine Next Due 12/30/2021      Done 12/30/2011 Imm Admin: Tdap Vaccine    COLONOSCOPY Next Due 2/28/2022      Done 2/29/2012           Patient Care Team:  Luis E Rutherford M.D. as PCP - General      ROS       Objective:          Physical Exam  Vitals signs and nursing note reviewed.   Constitutional:       Appearance: Normal appearance.   HENT:      Head: Normocephalic and atraumatic.      Right Ear: Tympanic  membrane and external ear normal.      Left Ear: Tympanic membrane and external ear normal.      Mouth/Throat:      Pharynx: No oropharyngeal exudate.   Eyes:      Conjunctiva/sclera: Conjunctivae normal.   Cardiovascular:      Rate and Rhythm: Normal rate and regular rhythm.      Heart sounds: Normal heart sounds. No murmur.   Pulmonary:      Effort: No respiratory distress.      Breath sounds: Normal breath sounds.   Abdominal:      General: Abdomen is flat. Bowel sounds are normal. There is no distension.      Palpations: Abdomen is soft.      Tenderness: There is no tenderness. There is no guarding.   Musculoskeletal:         General: No swelling.   Skin:     General: Skin is warm.   Neurological:      Mental Status: He is alert.   Psychiatric:         Mood and Affect: Mood normal.       Abdomen soft, nontender, nondistended, no hepatosplenomegaly, no mass, rebound, guarding, bowel sounds present          Assessment/Plan:     Assessment  #! Tension headache stable and improved on pamelor 10 mg initially went up to 20 mg but went back down to 10 mg.  Headaches have been stable.  Some constipation with medication.    #2 CKD has not had follow-up blood test yet, no regular Indocin use since Pamelor has been effective    #3 BMI 36.0    #4 hypothyroid on levothyroxine 175 mcg daily    #5 gout no recent flareup    #6 hypertension blood pressure still elevated today, has gained weight over the holidays but is working on nutrition    #7 episode of tinnitus resolved no hearing loss, no dizziness    #8 one episode of abdominal pain last month resolved after a few days, no evidence of acute abdomen asymptomatic now    Plan  #! Continue on pamelor 10 mg nightly, no hangover effect, can use MiraLAX or Metamucil for constipation    #2 minimize Indocin since that can affect CKD    #3 he will get labs done ordered previously    #4 work on nutrition, diet, weight loss    #5 if tinnitus recurs can refer to ENT but no associated  hearing loss or dizziness    #6 notify us if abdominal discomfort recurs, might have been related to constipation from Pamelor, he will use MiraLAX or Metamucil daily    #7 check blood pressure daily and record, he will get labs done we will call him with results    #8 follow-up 6 months

## 2020-08-31 ENCOUNTER — OFFICE VISIT (OUTPATIENT)
Dept: MEDICAL GROUP | Facility: MEDICAL CENTER | Age: 65
End: 2020-08-31
Payer: COMMERCIAL

## 2020-08-31 VITALS
OXYGEN SATURATION: 93 % | HEIGHT: 73 IN | SYSTOLIC BLOOD PRESSURE: 120 MMHG | TEMPERATURE: 97.8 F | HEART RATE: 62 BPM | DIASTOLIC BLOOD PRESSURE: 80 MMHG | BODY MASS INDEX: 36.05 KG/M2 | WEIGHT: 272 LBS

## 2020-08-31 DIAGNOSIS — E66.9 OBESITY (BMI 30-39.9): ICD-10-CM

## 2020-08-31 DIAGNOSIS — R73.09 IMPAIRED GLUCOSE METABOLISM: ICD-10-CM

## 2020-08-31 DIAGNOSIS — I15.9 SECONDARY HYPERTENSION: Chronic | ICD-10-CM

## 2020-08-31 DIAGNOSIS — Z87.39 HISTORY OF GOUT: Chronic | ICD-10-CM

## 2020-08-31 DIAGNOSIS — Z00.00 PREVENTATIVE HEALTH CARE: Chronic | ICD-10-CM

## 2020-08-31 DIAGNOSIS — Z12.5 PROSTATE CANCER SCREENING: ICD-10-CM

## 2020-08-31 DIAGNOSIS — F41.9 ANXIETY: ICD-10-CM

## 2020-08-31 DIAGNOSIS — R51.9 NONINTRACTABLE HEADACHE, UNSPECIFIED CHRONICITY PATTERN, UNSPECIFIED HEADACHE TYPE: ICD-10-CM

## 2020-08-31 DIAGNOSIS — E55.9 VITAMIN D DEFICIENCY: ICD-10-CM

## 2020-08-31 DIAGNOSIS — E78.5 DYSLIPIDEMIA: Chronic | ICD-10-CM

## 2020-08-31 PROCEDURE — 99396 PREV VISIT EST AGE 40-64: CPT | Performed by: INTERNAL MEDICINE

## 2020-08-31 RX ORDER — GABAPENTIN 100 MG/1
CAPSULE ORAL
Qty: 90 CAP | Refills: 5 | Status: SHIPPED
Start: 2020-08-31 | End: 2020-11-03

## 2020-08-31 ASSESSMENT — FIBROSIS 4 INDEX: FIB4 SCORE: 1.52

## 2020-08-31 NOTE — PROGRESS NOTES
Subjective:      Luan Schmidt is a 65 y.o. male who presents with annual        HPI     Here for annual exam  meds and allergies reviewed and updated, medical history, surgical history, social history, family history reviewed  Patient initially seen by myself for headaches in December of last year at that time had been ongoing for about 6 months, Indocin was beneficial for headaches which he had been using on a daily basis.  We started Pamelor in December.  Pamelor was titrated up to 20 mg with initial benefit for headache relief at the 20 mg dose but did cause constipation at the higher 20 mg dose, such that he decrease the medication back to 10 mg, however over time the benefits did decrease.  He did cut back on the medication to 10 mg because of constipation and with recurrent headache increase it back up to 20 mg with no clear benefit again as it had initially been helpful.  Typically these headaches will occur daily, worse with position.  Frontal in nature bilateral, can be dull, not unilateral. Headache frontal in nature occurs daily, can be worse with coughing and sneezing, straining with bowel movement, bending over, takes indocin 2 to 3 per week, also has tried excedrin, aleve, tyelenol instead of the indocin.  However Indocin works best.  Will wake up in the morning with the headache as well, no vision changes, no nausea or vomiting. Had one episode of vision changes seeing flashing lights in front of his right I did see his ophthalmologist, this was not associated with the headache, and no associated neurologic deficits such as difficulty with speech, swallowing, weakness of arms or legs with the headache or with the episode of flashing light.  That flashlight episode has not recurred.  No history of head trauma, no history of seizures.  No history of migraine headache.  No neck stiffness or nuchal rigidity.  Social history still working full-time, , works full-time [employer].  Trying to  get healthier diet, but because of work and significant travel driving to and from work, no time to exercise currently.  Anxiety stable on zoloft no anxiety worsening or depression, planning on retiring from his job in June.  Eye exam just had done no current changes or recurrence of the flashing light in front of his right eye  Dental check twice per year   Has experienced occasional right leg pain usually occurring driving long distances, no numbness, no weakness in legs, no low back pain otherwise, typically this will occur when seated for 30 to 45 minutes or longer.  Does not happen at any other time.  Dyslipidemia on Lipitor  Hypertension on lisinopril and Toprol does not check blood pressure on a regular basis  Hypothyroid on replacement  History of elevated uric acid no recent flareup of gout or symptoms of gout    Current Outpatient Medications   Medication Sig Dispense Refill   • indomethacin (INDOCIN) 50 MG Cap Take 1 Cap by mouth 3 times a day as needed (gout flare up. should not be taking this daily. this should last more than 3 months.). 270 Cap 0   • nortriptyline (PAMELOR) 10 MG Cap Take 1 Cap by mouth every evening. 90 Cap 3   • tadalafil (CIALIS) 5 MG tablet Take 1 Tab by mouth every day. 90 Tab 3   • lisinopril (PRINIVIL) 20 MG Tab Take 1 Tab by mouth every day. 90 Tab 1   • sertraline (ZOLOFT) 50 MG Tab Take 1.5 Tabs by mouth every day. 135 Tab 3   • atorvastatin (LIPITOR) 20 MG Tab Take 1 Tab by mouth every day. 90 Tab 3   • metoprolol SR (TOPROL XL) 100 MG TABLET SR 24 HR Take 1 Tab by mouth every day. 90 Tab 3   • levothyroxine (SYNTHROID) 175 MCG Tab Take 1 Tab by mouth Every morning on an empty stomach. One po six days per week 90 Tab 3   • vitamin D, Ergocalciferol, (DRISDOL) 70249 units Cap capsule Take 1 Cap by mouth every 7 days. (Patient not taking: Reported on 1/30/2020) 12 Cap 3   • aspirin (ASA) 81 MG CHEW Take 1 Tab by mouth every day. 100 Each 11     No current facility-administered  medications for this visit.      Anxiety  8/19/16 RONALD score 10, PHQ 4, start zoloft 50 mg, declines psychotherapy  10/16/17 on zoloft 75 mg   12/3/19 on zoloft 75 mg add pamelor 10 mg for headache     Dyslipidemia  3/09 chol  299, trig 367,hdl 47, ldl 179 started on simcor no followup testing   6/10 chol 278,trig 366,hdl 51,ldl 154 add fish oil 2 pills bid, will not increase simcor due to hx gout  4/11 off simcor  4/11 chol 215,trig 232,hdl 39,ldl 130 off meds  12/31/11 chol 245,trig 240,hdl 48,ldl 149 off meds  10/12 chol 268,trig 239,hdl 54,ldl 166 start fish oil two pills daily  11/29/13 chol 265,trig 229,hdl 46,ldl 168  12/19/13 start lipitor 20 mg  3/27/14 chol 190,trig 164,hdl 51,ldl 106 on lipitor 20 mg  8/29/14 chol 208,trig 280,hdl 51,ldl 101 on lipitor 20 mg  10/15/15 chol 187,trig 223,hdl 46,ldl 96 on lipitor 20 mg  9/16/16 chol 182,trig 262,hdl 46,ldl 84 on lipitor 20 mg  10/4/17 chol 221,trig 324,hdl 51,ldl 105 on lipitor 20 mg  7/2/19 chol 216,trig 312,hdl 41,ldl 113 on lipitor 20 mg     history esophagitis  10/9/15 dyspepsia, chest x-ray, ultrasound, stress echo pending  10/29/15 ultrasound enlarged liver increased echotexture fatty infiltration or hepatocellular disease   11/31/15 TSH ordered for next month and labs for peripheral neuropathy feet, declines MRI lumbar spine repeat EMG NCS for now, cont limit etoh  11/19/15 stress echo mild concentric LVH on baseline echo, excellent exercise tolerance, negative stress echo for ischemia, brenna protocol 11 minutes 48 seconds  11/19/15 cxr negative  2/28/16 [facility] consultation note, solid food dysphagia, will arrange for EGD  3/8/18 EGD per [facility] grade B esophagitis and gastritis, biopsy performed, take prilosec 20 mg daily   3/23/18 [facility] note dysphagia, recent EGD grade B esophagitis, biopsies negative, swelling has improved, did not start omeprazole, unsure if this is hypertonic UES versus cricopharyngeal achalasia or hypertrophy given lack of eosinophilic  esophagitis or schatzki's ring, recommend trial omeprazole for 2 months then wean off if possible     Erectile dysfunction  12/20/11 sample cialis 10 mg and 5 mg  3/17/14 change cialis to 2.5 mg cost  8/26/14 samples cialis 5 mg  9/16/16 testosterone 234      History of esophagitis  10/9/15 dyspepsia, chest x-ray, ultrasound, stress echo pending  10/29/15 ultrasound enlarged liver increased echotexture fatty infiltration or hepatocellular disease   11/31/15 TSH ordered for next month and labs for peripheral neuropathy feet, declines MRI lumbar spine repeat EMG NCS for now, cont limit etoh  11/19/15 stress echo mild concentric LVH on baseline echo, excellent exercise tolerance, negative stress echo for ischemia, brenna protocol 11 minutes 48 seconds  11/19/15 cxr negative  2/28/16 [facility] consultation note, solid food dysphagia, will arrange for EGD  3/8/18 EGD per [facility] grade B esophagitis and gastritis, biopsy performed, take prilosec 20 mg daily   3/23/18 [facility] note dysphagia, recent EGD grade B esophagitis, biopsies negative, swelling has improved, did not start omeprazole, unsure if this is hypertonic UES versus cricopharyngeal achalasia or hypertrophy given lack of eosinophilic esophagitis or schatzki's ring, recommend trial omeprazole for 2 months then wean off if possible     History of gout  On no medication for over 2 years  6/10 uric 9.6 monitor for gout flare up since on simcor  4/18/11 left elbow pain question gout flareup  4/11 uric 7.1  12/11 uric 7.0  1/17/12 gout flare up  10/12 uric 7.8  11/29/13 uric 7.9  8/29/14 uric 7.9  10/15/15 uric 8.8  9/16/16 uric 7.7  10/4/17 uric 8.7  7/2/19 uric 9.4  7/8/19 declines allopurinol     headache  10/2/19 MRI brain negative  12/3/19 headache, question neuralgia, trial of pamelor 10 mg  12/23/19 increase pamelor to 20 mg     Hearing loss  9/12  audiology note mild high-frequency sensorineural loss right ear, brainstem response normal     Hypertension  6/14/10 add  lisinopril 10 mg to toprol  mg  3/15/13 treadmill thallium, treadmill component 10 minutes 48 seconds 1 mm upsloping ST segment depression nondiagnostic, hypertensive response to exercise; ejection fraction 61%, no ischemia noted  11/29/13 urine mac < 2.4 on lisinopril 10 mg and toprol  mg  10/15/15 urine mac <0.5 on lisinopril 10 mg and toprol  mg  11/19/15 stress echo mild concentric LVH on baseline echo, excellent exercise tolerance, negative stress echo for ischemia, brenna protocol 11 minutes 48 seconds  9/16/16 urine mac<0.7 on lisinopril 10 mg and toprol  mg  10/4/17 urine mac 8.7 on lisinopril 10 mg and toprol  mg  2/8/18 treadmill stress test, brenna protocol 10 minutes, 12.8 METS, target heart rate achieved, no chest pain, no t-wave changes, normal stress EKG  12/3/19 increase lisinopril to 20 mg daily and continue toprol  mg     Hypothyroid  3/09 tsh 7.5 at that time had been on 0.2 mg half a tablet plus 0.125 for a total of 0.225 daily, this was changed to 0.2 mg one tablet po every Tuesday Thursday Saturday Sunday and 0.2 mg 1-1/2 tablets on Monday Wednesday Friday  6/10 tsh 0.148  6/23/10 change to levothyroxine 0.2 daily repeat in 6 weeks  8/27/10 tsh 2.7   4/11 tsh 0.6 on levothyroxine 200 mcg  10/12 tsh 27 on levothyroxine 200 mcg but not taking regularly  11/29/13 tsh 8.6 on levothyroxine 200 mcg daily  12/19/13 take levothyroxine 200 mg before eating  3/27/14 tsh 0.8 on levothyroxine 200 mcg  8/29/14 tsh 8.3 on levothyroxine 200 mcg taking thyroid med with supplements in am he will try to change to taking medication separate from everything, repeat labs 6 weeks  10/15/15 tsh 0.12 on levothyroxine 200 mcg daily change to levothyroxine 175 mcg  3/23/16 tsh 1.1 on levothyroxine 175 mcg  9/16/16 tsh 0.1 on levothyroxine 175 mcg change to one po six days per week, repeat tsh 6 weeks  1/30/17 tsh 1.3 on levothyroxine 175 mcg six days per week  10/4/17 tsh 2.1 on  levothyroxine 175 mcg six days per week  7/2/19 tsh 8.6 on levothyroxine 175 mcg six days per week  7/8/19 change to levothyroxine 175 mcg daily repeat tsh 6 weeks     Impaired glucose metabolism  7/2/19 A1c 5.9%     Insomnia  8/26/14 trial ambien 10 mg  9/7/17 continues on ambien, declines sleep psychology evaluation, work on taper     Peripheral neuropathy  10/27/12 right foot neuropathy labs tsh 27, MAXIMUS,SPEP,folate,B12,B1,ESR negative, if negative consider EMG NCS  10/29/12 pt states not taking levothyroxine 200 mcg reg, will start daily and repeat labs tsh and lipid in 6 weeks  12/24/12 no improvement in symptoms when taking levothyroxine regularly, has decreased etoh  2/13 EMG NCS normal motor and sensory nerve conduction studies bilateral lower extremities     Preventive health  12/30/11 tdap  2/29/12 colon per [facility] repeat in 10 yrs  8/26/14 zoster vaccine  6/27/19 declines prostate and rectal exam  7/2/19 psa 0.4  7/2/19 vit d 26 start 4000 units  8/22/19 cologuard negative     Renal cyst  10/29/15 ultrasound enlarged liver increased echotexture fatty infiltration or hepatocellular disease, 3.2 cm probable complicated cystic exophytic lesion right kidney  11/30/15 MRI abdomen with and without; posterior lateral exophytic right kidney lesion 2.6 x 1.8 x 2.9 cm, no internal enhancement, consistent with benign cyst, also incidental exophytic 13 mm right renal cyst, no followup is necessary              Patient Active Problem List   Diagnosis   • Hypothyroid   • Dyslipidemia   • Rosacea   • Hypertension   • History of gout   • S/P appendectomy   • Preventative health care   • Erectile dysfunction   • History of hearing loss   • Peripheral neuropathy   • Obesity (BMI 30-39.9)   • Insomnia   • Renal cyst   • History of esophagitis   • Anxiety   • Impaired glucose metabolism   • Headache          Health Maintenance Summary                HEPATITIS C SCREENING Overdue 1955     IMM ZOSTER VACCINES Overdue  "10/21/2014      Done 8/26/2014 Imm Admin: Zoster Vaccine Live (ZVL) (Zostavax)    IMM INFLUENZA Next Due 9/1/2020      Done 12/3/2019 Imm Admin: Influenza Vaccine Quad Inj (Pf)     Patient has more history with this topic...    IMM DTaP/Tdap/Td Vaccine Next Due 12/30/2021      Done 12/30/2011 Imm Admin: Tdap Vaccine    COLONOSCOPY Next Due 2/28/2022      Done 2/29/2012           Patient Care Team:  Luis E Rutherford M.D. as PCP - General      ROS       Objective:     /80 (BP Location: Right arm, Patient Position: Sitting)   Pulse 62   Temp 36.6 °C (97.8 °F) (Temporal)   Ht 1.854 m (6' 1\")   Wt 123.4 kg (272 lb)   SpO2 93%   BMI 35.89 kg/m²      Physical Exam  Vitals signs and nursing note reviewed.   Constitutional:       Appearance: Normal appearance.   HENT:      Head: Normocephalic and atraumatic.      Right Ear: External ear normal.      Left Ear: External ear normal.   Eyes:      General:         Right eye: No discharge.         Left eye: No discharge.      Extraocular Movements: Extraocular movements intact.      Conjunctiva/sclera: Conjunctivae normal.      Pupils: Pupils are equal, round, and reactive to light.   Neck:      Musculoskeletal: Neck supple.   Cardiovascular:      Rate and Rhythm: Normal rate and regular rhythm.      Heart sounds: Normal heart sounds. No murmur.   Pulmonary:      Effort: Pulmonary effort is normal. No respiratory distress.      Breath sounds: Normal breath sounds.   Abdominal:      General: There is no distension.   Musculoskeletal:         General: No swelling.   Skin:     General: Skin is warm.   Neurological:      General: No focal deficit present.      Mental Status: He is alert.   Psychiatric:         Mood and Affect: Mood normal.         Behavior: Behavior normal.       Cranial nerves intact, no temporal artery tenderness  Pupils equal reactive no obvious papilledema  Extraocular movements intact  No nystagmus  Normal cervical range of motion no nuchal " rigidity  Normal strength upper lower extremities  Normal  strength bilateral  Lower extremities normal dorsiflexion, plantarflexion, knee flexion extension, hip flexion extension, negative straight leg raise on right side  Romberg negative no ataxia          Assessment/Plan:        Assessment  #! Annual exam    #2 hypertension stable on lisinopril and Toprol    #3 dyslipidemia on Lipitor    #4 orthostatic headache seems to be positional in nature occurring daily since last year, MRI done last October negative, Indocin seems to help, Pamelor did help initially but developed side effects at the higher doses of that medication, question 1 episode recent visual changes and associated headache possible atypical migraine, did see his ophthalmologist no findings of alarm or concern per patient    #5 Right leg pain usually occurring driving long distances, no numbness, no weakness in legs, no current symptoms, normal exam lower extremity strength    #6 hypothyroid on Synthroid 175 mcg    #7 anxiety stable on Zoloft    #8 preventative health  12/30/11 tdap  2/29/12 colon per [facility] repeat in 10 yrs  8/26/14 zoster vaccine  6/27/19 declines prostate and rectal exam  7/2/19 psa 0.4  7/2/19 vit d 26 start 4000 units  8/22/19 cologuard negative    #9 BMI 35.8      Plan  #! Will check with pharmacy about flu vaccine and shingrix since we are out of both    #2 labs     #3 neurology referral chronic daily headache with positional variation    #4 trial of gabapentin 100 mg 3 times daily, increase to 20 mg 3 times daily over the next 2 weeks, can cause nausea, lightheadedness, dizziness, drowsiness, no alcohol with medication, try to limit indomethacin use for headaches    #5  Cologuard negative last year, repeat colonoscopy 2 years    #6 nutrition, diet, exercise discussed, check blood pressures periodically, continue blood pressure medication, try to work on exercise and weight loss as much as possible given his work stress and  demands as obesity does increase risk for diabetes and heart disease    #7 continue social distancing and mask wearing    #8 due for TD AP next year    #9 follow-up 3 months

## 2020-10-12 ENCOUNTER — TELEPHONE (OUTPATIENT)
Dept: MEDICAL GROUP | Facility: MEDICAL CENTER | Age: 65
End: 2020-10-12

## 2020-10-12 NOTE — TELEPHONE ENCOUNTER
Please notify the patient he needs to have his labs done. These were ordered in August, he has not had labs done since July 2019.

## 2020-10-19 ENCOUNTER — HOSPITAL ENCOUNTER (OUTPATIENT)
Dept: LAB | Facility: MEDICAL CENTER | Age: 65
End: 2020-10-19
Attending: INTERNAL MEDICINE
Payer: COMMERCIAL

## 2020-10-19 DIAGNOSIS — I15.9 SECONDARY HYPERTENSION: Chronic | ICD-10-CM

## 2020-10-19 DIAGNOSIS — G44.209 TENSION-TYPE HEADACHE, NOT INTRACTABLE, UNSPECIFIED CHRONICITY PATTERN: ICD-10-CM

## 2020-10-19 DIAGNOSIS — R73.09 IMPAIRED GLUCOSE METABOLISM: ICD-10-CM

## 2020-10-19 DIAGNOSIS — E55.9 VITAMIN D DEFICIENCY: ICD-10-CM

## 2020-10-19 DIAGNOSIS — Z00.00 PREVENTATIVE HEALTH CARE: Chronic | ICD-10-CM

## 2020-10-19 DIAGNOSIS — Z87.39 HISTORY OF GOUT: Chronic | ICD-10-CM

## 2020-10-19 DIAGNOSIS — N18.9 CHRONIC KIDNEY DISEASE, UNSPECIFIED CKD STAGE: ICD-10-CM

## 2020-10-19 DIAGNOSIS — E78.5 DYSLIPIDEMIA: Chronic | ICD-10-CM

## 2020-10-19 DIAGNOSIS — Z11.59 NEED FOR HEPATITIS C SCREENING TEST: ICD-10-CM

## 2020-10-19 DIAGNOSIS — Z12.5 PROSTATE CANCER SCREENING: ICD-10-CM

## 2020-10-19 LAB
25(OH)D3 SERPL-MCNC: 38 NG/ML (ref 30–100)
ALBUMIN SERPL BCP-MCNC: 4.7 G/DL (ref 3.2–4.9)
ALBUMIN/GLOB SERPL: 1.6 G/DL
ALP SERPL-CCNC: 75 U/L (ref 30–99)
ALT SERPL-CCNC: 27 U/L (ref 2–50)
ANION GAP SERPL CALC-SCNC: 14 MMOL/L (ref 7–16)
APPEARANCE UR: CLEAR
AST SERPL-CCNC: 21 U/L (ref 12–45)
BASOPHILS # BLD AUTO: 0.5 % (ref 0–1.8)
BASOPHILS # BLD: 0.04 K/UL (ref 0–0.12)
BILIRUB SERPL-MCNC: 0.4 MG/DL (ref 0.1–1.5)
BILIRUB UR QL STRIP.AUTO: NEGATIVE
BUN SERPL-MCNC: 32 MG/DL (ref 8–22)
CALCIUM SERPL-MCNC: 9.8 MG/DL (ref 8.5–10.5)
CHLORIDE SERPL-SCNC: 103 MMOL/L (ref 96–112)
CHOLEST SERPL-MCNC: 266 MG/DL (ref 100–199)
CO2 SERPL-SCNC: 21 MMOL/L (ref 20–33)
COLOR UR: YELLOW
CREAT SERPL-MCNC: 1.43 MG/DL (ref 0.5–1.4)
CREAT UR-MCNC: 205.64 MG/DL
EOSINOPHIL # BLD AUTO: 0.15 K/UL (ref 0–0.51)
EOSINOPHIL NFR BLD: 1.8 % (ref 0–6.9)
ERYTHROCYTE [DISTWIDTH] IN BLOOD BY AUTOMATED COUNT: 45.4 FL (ref 35.9–50)
FASTING STATUS PATIENT QL REPORTED: NORMAL
GLOBULIN SER CALC-MCNC: 3 G/DL (ref 1.9–3.5)
GLUCOSE SERPL-MCNC: 92 MG/DL (ref 65–99)
GLUCOSE UR STRIP.AUTO-MCNC: NEGATIVE MG/DL
HCT VFR BLD AUTO: 46.1 % (ref 42–52)
HCV AB SER QL: NORMAL
HDLC SERPL-MCNC: 43 MG/DL
HGB BLD-MCNC: 14.8 G/DL (ref 14–18)
IMM GRANULOCYTES # BLD AUTO: 0.08 K/UL (ref 0–0.11)
IMM GRANULOCYTES NFR BLD AUTO: 1 % (ref 0–0.9)
KETONES UR STRIP.AUTO-MCNC: NEGATIVE MG/DL
LDLC SERPL CALC-MCNC: ABNORMAL MG/DL
LEUKOCYTE ESTERASE UR QL STRIP.AUTO: NEGATIVE
LYMPHOCYTES # BLD AUTO: 2.24 K/UL (ref 1–4.8)
LYMPHOCYTES NFR BLD: 27.2 % (ref 22–41)
MCH RBC QN AUTO: 31 PG (ref 27–33)
MCHC RBC AUTO-ENTMCNC: 32.1 G/DL (ref 33.7–35.3)
MCV RBC AUTO: 96.4 FL (ref 81.4–97.8)
MICRO URNS: NORMAL
MICROALBUMIN UR-MCNC: <1.2 MG/DL
MICROALBUMIN/CREAT UR: NORMAL MG/G (ref 0–30)
MONOCYTES # BLD AUTO: 0.62 K/UL (ref 0–0.85)
MONOCYTES NFR BLD AUTO: 7.5 % (ref 0–13.4)
NEUTROPHILS # BLD AUTO: 5.12 K/UL (ref 1.82–7.42)
NEUTROPHILS NFR BLD: 62 % (ref 44–72)
NITRITE UR QL STRIP.AUTO: NEGATIVE
NRBC # BLD AUTO: 0 K/UL
NRBC BLD-RTO: 0 /100 WBC
PH UR STRIP.AUTO: 5 [PH] (ref 5–8)
PHOSPHATE SERPL-MCNC: 3.3 MG/DL (ref 2.5–4.5)
PLATELET # BLD AUTO: 223 K/UL (ref 164–446)
PMV BLD AUTO: 11.2 FL (ref 9–12.9)
POTASSIUM SERPL-SCNC: 4.8 MMOL/L (ref 3.6–5.5)
PROT SERPL-MCNC: 7.7 G/DL (ref 6–8.2)
PROT UR QL STRIP: NEGATIVE MG/DL
PSA SERPL-MCNC: 0.44 NG/ML (ref 0–4)
PTH-INTACT SERPL-MCNC: 34.5 PG/ML (ref 14–72)
RBC # BLD AUTO: 4.78 M/UL (ref 4.7–6.1)
RBC UR QL AUTO: NEGATIVE
SODIUM SERPL-SCNC: 138 MMOL/L (ref 135–145)
SP GR UR STRIP.AUTO: 1.03
TRIGL SERPL-MCNC: 475 MG/DL (ref 0–149)
TSH SERPL DL<=0.005 MIU/L-ACNC: 7.02 UIU/ML (ref 0.38–5.33)
URATE SERPL-MCNC: 9.7 MG/DL (ref 2.5–8.3)
UROBILINOGEN UR STRIP.AUTO-MCNC: 0.2 MG/DL
WBC # BLD AUTO: 8.3 K/UL (ref 4.8–10.8)

## 2020-10-19 PROCEDURE — 84155 ASSAY OF PROTEIN SERUM: CPT

## 2020-10-19 PROCEDURE — 82570 ASSAY OF URINE CREATININE: CPT

## 2020-10-19 PROCEDURE — 81003 URINALYSIS AUTO W/O SCOPE: CPT

## 2020-10-19 PROCEDURE — 85652 RBC SED RATE AUTOMATED: CPT

## 2020-10-19 PROCEDURE — 84550 ASSAY OF BLOOD/URIC ACID: CPT

## 2020-10-19 PROCEDURE — 86803 HEPATITIS C AB TEST: CPT

## 2020-10-19 PROCEDURE — 36415 COLL VENOUS BLD VENIPUNCTURE: CPT

## 2020-10-19 PROCEDURE — 83970 ASSAY OF PARATHORMONE: CPT

## 2020-10-19 PROCEDURE — 84100 ASSAY OF PHOSPHORUS: CPT

## 2020-10-19 PROCEDURE — 83036 HEMOGLOBIN GLYCOSYLATED A1C: CPT

## 2020-10-19 PROCEDURE — 82043 UR ALBUMIN QUANTITATIVE: CPT

## 2020-10-19 PROCEDURE — 84165 PROTEIN E-PHORESIS SERUM: CPT

## 2020-10-19 PROCEDURE — 84153 ASSAY OF PSA TOTAL: CPT

## 2020-10-19 PROCEDURE — 82306 VITAMIN D 25 HYDROXY: CPT

## 2020-10-19 PROCEDURE — 80053 COMPREHEN METABOLIC PANEL: CPT

## 2020-10-19 PROCEDURE — 85025 COMPLETE CBC W/AUTO DIFF WBC: CPT

## 2020-10-19 PROCEDURE — 80061 LIPID PANEL: CPT

## 2020-10-19 PROCEDURE — 84443 ASSAY THYROID STIM HORMONE: CPT

## 2020-10-20 LAB
ERYTHROCYTE [SEDIMENTATION RATE] IN BLOOD BY WESTERGREN METHOD: 3 MM/HOUR (ref 0–20)
EST. AVERAGE GLUCOSE BLD GHB EST-MCNC: 108 MG/DL
HBA1C MFR BLD: 5.4 % (ref 0–5.6)

## 2020-10-21 DIAGNOSIS — I15.9 SECONDARY HYPERTENSION: Chronic | ICD-10-CM

## 2020-10-21 DIAGNOSIS — R51.9 NONINTRACTABLE HEADACHE, UNSPECIFIED CHRONICITY PATTERN, UNSPECIFIED HEADACHE TYPE: ICD-10-CM

## 2020-10-21 DIAGNOSIS — Z87.39 HISTORY OF GOUT: Chronic | ICD-10-CM

## 2020-10-21 DIAGNOSIS — E78.5 DYSLIPIDEMIA: Chronic | ICD-10-CM

## 2020-10-22 LAB
ALBUMIN SERPL ELPH-MCNC: 4.6 G/DL (ref 3.75–5.01)
ALPHA1 GLOB SERPL ELPH-MCNC: 0.26 G/DL (ref 0.19–0.46)
ALPHA2 GLOB SERPL ELPH-MCNC: 0.75 G/DL (ref 0.48–1.05)
B-GLOBULIN SERPL ELPH-MCNC: 1 G/DL (ref 0.48–1.1)
EER PROT ELECT SER Q1092: NORMAL
GAMMA GLOB SERPL ELPH-MCNC: 0.9 G/DL (ref 0.62–1.51)
INTERPRETATION SERPL IFE-IMP: NORMAL
PROT SERPL-MCNC: 7.5 G/DL (ref 6.3–8.2)

## 2020-10-26 ENCOUNTER — TELEPHONE (OUTPATIENT)
Dept: MEDICAL GROUP | Facility: MEDICAL CENTER | Age: 65
End: 2020-10-26

## 2020-10-26 PROBLEM — N18.30 CKD (CHRONIC KIDNEY DISEASE) STAGE 3, GFR 30-59 ML/MIN: Status: ACTIVE | Noted: 2020-10-26

## 2020-10-27 ENCOUNTER — TELEPHONE (OUTPATIENT)
Dept: MEDICAL GROUP | Facility: MEDICAL CENTER | Age: 65
End: 2020-10-27

## 2020-10-27 DIAGNOSIS — N18.31 STAGE 3A CHRONIC KIDNEY DISEASE: ICD-10-CM

## 2020-10-27 DIAGNOSIS — M10.9 GOUT OF FOOT, UNSPECIFIED CAUSE, UNSPECIFIED CHRONICITY, UNSPECIFIED LATERALITY: ICD-10-CM

## 2020-10-27 DIAGNOSIS — E78.5 DYSLIPIDEMIA: Chronic | ICD-10-CM

## 2020-10-27 DIAGNOSIS — E03.9 HYPOTHYROIDISM, UNSPECIFIED TYPE: Chronic | ICD-10-CM

## 2020-10-27 NOTE — TELEPHONE ENCOUNTER
Called patient, unable to leave a message, voicemail box is full.  Please notify the patient that his test shows;  (1) his thyroid test is still slightly low, is he taking his thyroid medication daily, 30 minutes prior to meals, medications, or other supplements, with water only?  (2) if so then we will need to increase his thyroid dose to 200 mcg daily.  Let me know.  (3) his kidney function is slightly decreased compared to his last test, we need to have him not take Indocin at all as this may irritate his kidneys.  I would like to repeat his kidney function test, and we can do that in 6 weeks when we repeat his thyroid test since we are going to change his thyroid dosing.  (4) his cholesterol is also higher at 266, previously it was 216, and his triglycerides are 475, previously they were 312, and they should be less than 150.  He needs to work on a good nutrition program, regular exercise, and weight loss.  I would suggest adding a second medication for his cholesterol and triglycerides, as his triglycerides are significantly elevated now.  This would be another cholesterol medication in addition to his Lipitor.  The main side effects of the new medication would be muscle cramps and muscle aches and we would need to repeat his cholesterol labs in 6 to 8 weeks when we repeat his thyroid and kidney function test.  (5) his gout acid level is also still too high, that can cause increased gout attacks. Instead of him taking Indocin periodically which can damage his kidneys, I recommend starting a once a day gout medication called allopurinol, if he is in agreement let me know

## 2020-10-27 NOTE — TELEPHONE ENCOUNTER
Pt notified and wanted you to know that per your last conversation, he was only taking his thyroid med. 6 days a week. He also wanted you to know that he saw a neurologist who had started him on a couple of meds. They didn't seem to work so he stopped those. He had some dental work done and the headaches stopped for awhile but have started coming back. He has started Fish Oil and would like to wait 6 wks to start the Allopurinal before he starts a 2nd Statin. Pt is fine with switching to Allopurinol.

## 2020-10-27 NOTE — TELEPHONE ENCOUNTER
----- Message from Luis E Rutherford M.D. sent at 10/26/2020  8:13 PM PDT -----  Called patient, unable to leave a message, voicemail box is full.  Please notify the patient that his test shows;  (1) his thyroid test is still slightly low, is he taking his thyroid medication daily, 30 minutes prior to meals, medications, or other supplements, with water only?  (2) if so then we will need to increase his thyroid dose to 200 mcg daily.  Let me know.  (3) his kidney function is slightly decreased compared to his last test, we need to have him not take Indocin at all as this may irritate his kidneys.  I would like to repeat his kidney function test, and we can do that in 6 weeks when we repeat his thyroid test since we are going to change his thyroid dosing.  (4) his cholesterol is also higher at 266, previously it was 216, and his triglycerides are 475, previously they were 312, and they should be less than 150.  He needs to work on a good nutrition program, regular exercise, and weight loss.  I would suggest adding a second medication for his cholesterol and triglycerides, as his triglycerides are significantly elevated now.  This would be another cholesterol medication in addition to his Lipitor.  The main side effects of the new medication would be muscle cramps and muscle aches and we would need to repeat his cholesterol labs in 6 to 8 weeks when we repeat his thyroid and kidney function test.  (5) his gout acid level is also still too high, that can cause increased gout attacks. Instead of him taking Indocin periodically which can damage his kidneys, I recommend starting a once a day gout medication called allopurinol, if he is in agreement let me know

## 2020-10-28 RX ORDER — ALLOPURINOL 300 MG/1
300 TABLET ORAL DAILY
Qty: 90 TAB | Refills: 1 | Status: SHIPPED | OUTPATIENT
Start: 2020-10-28 | End: 2021-04-16

## 2020-10-29 NOTE — TELEPHONE ENCOUNTER
Noted, thank you for contacting him.  (1) could you please ask him which neurologist he saw, I do not have those records, we need to get those.  (2) I will send a gout medication allopurinol to his mail-in pharmacy, have him take that daily.  Have him take 1 pill day.  The medication rarely can cause fevers or a sore throat, rarely irritating the liver although his liver function is fine, if he has side effects let me know.  (3) have him take the thyroid medication 7 days a week.  (4) we can hold off on a second cholesterol medication for now.  (5) we will need to repeat his blood test for his thyroid and gout blood tests in 6 to 8 weeks, I will place the fasting orders in the computer system for him.

## 2020-11-02 NOTE — TELEPHONE ENCOUNTER
Pts appt is 11/3 with Dr Bernardo.    Pt advised about meds as per the previous note from you. He will redo his labs as requested.

## 2020-11-02 NOTE — TELEPHONE ENCOUNTER
Thank you, he said he saw the neurologist previously, could you obtain the old records from neurology(), I am not sure what group he is affiliated with.

## 2020-11-03 ENCOUNTER — OFFICE VISIT (OUTPATIENT)
Dept: NEUROLOGY | Facility: MEDICAL CENTER | Age: 65
End: 2020-11-03
Payer: COMMERCIAL

## 2020-11-03 VITALS
WEIGHT: 273.37 LBS | TEMPERATURE: 97.2 F | HEIGHT: 73 IN | BODY MASS INDEX: 36.23 KG/M2 | SYSTOLIC BLOOD PRESSURE: 138 MMHG | DIASTOLIC BLOOD PRESSURE: 80 MMHG | HEART RATE: 56 BPM | OXYGEN SATURATION: 97 %

## 2020-11-03 DIAGNOSIS — G44.83 PRIMARY COUGH HEADACHE: Primary | ICD-10-CM

## 2020-11-03 PROCEDURE — 99204 OFFICE O/P NEW MOD 45 MIN: CPT | Performed by: PSYCHIATRY & NEUROLOGY

## 2020-11-03 ASSESSMENT — FIBROSIS 4 INDEX: FIB4 SCORE: 1.16

## 2020-11-03 NOTE — PROGRESS NOTES
"[facility] NEUROLOGY  NEW PATIENT VISIT    Referral source: Luis Echristiana Rutherford MD    CC: headache    HISTORY OF ILLNESS:  Luan Schmidt is a 65 y.o. woman with a history most notable for HTN, HLD, CKD, insomnia, and anxiety.  Today, he was unaccompanied, and he provided the following history:    Prior to 8/2019 Luan had a few headaches.  These were usually attributed to hangover.    The following is a summary of Luan's headache symptoms, presented in my standard format:    Age at onset: 8/2019  Aura: none  Location: frontal, top of head  Radiation: none  Photophobia/Phonophobia/Nausea/Vomiting: none/none/none/none  Physical Activity: bending at the waist, lying down, rolling over  Frequency: 7 days/week  Headache Days/Month: 30  Quality: \"throbbing and stabbing\" especially after a cough or a sneeze  Intensity: 8-9 after coughing or sneezing  Duration: never completely resolves  Triggers: coughing, sneezing  Associated Symptoms: none  Autonomic Signs (such as ptosis, miosis, conjunctival injection, rhinorrhea, increased lacrimation): none  Family History: mother had migraines  Head Trauma: none  Sleep: 6 hours/night  Caffeine Intake: very little  ED Visits: none  Hospitalizations: none  Missed Work Days: none    Medications Tried: Response  - Excedrin: lately this is helpful for 4-6 hours  - acetaminophen: ineffective  - Tylenol: ineffective  - indomethacin: was taking this often, stopped this lately  - gabapentin 300 mg BID: ineffective  - nortriptyline 20 mg daily: ineffective    MEDICAL AND SURGICAL HISTORY:  Past Medical History:   Diagnosis Date   • Dyslipidemia 8/23/2009   • Hypertension    • Renal cyst 10/29/2015     Past Surgical History:   Procedure Laterality Date   • APPENDECTOMY       MEDICATIONS:  Current Outpatient Medications   Medication Sig   • allopurinol (ZYLOPRIM) 300 MG Tab Take 1 Tab by mouth every day.   • lisinopril (PRINIVIL) 10 MG Tab TAKE 1 TABLET BY MOUTH  EVERY DAY   • " metoprolol SR (TOPROL XL) 100 MG TABLET SR 24 HR TAKE 1 TABLET BY MOUTH  EVERY DAY   • levothyroxine (SYNTHROID) 175 MCG Tab TAKE 1 TABLET BY MOUTH  EVERY DAY   • sertraline (ZOLOFT) 50 MG Tab TAKE 1 AND 1/2 TABLETS BY  MOUTH EVERY DAY   • atorvastatin (LIPITOR) 20 MG Tab TAKE 1 TABLET BY MOUTH  EVERY DAY   • tadalafil (CIALIS) 5 MG tablet Take 1 Tab by mouth every day.   • aspirin (ASA) 81 MG CHEW Take 1 Tab by mouth every day.     SOCIAL HISTORY:  Social History     Tobacco Use   • Smoking status: Former Smoker   • Smokeless tobacco: Never Used   • Tobacco comment: 15-20 y.a   Substance Use Topics   • Alcohol use: Yes     Alcohol/week: 0.0 oz     Comment: half bottle wine nightly      Social History     Social History Narrative   • Not on file     FAMILY HISTORY:  Family History   Problem Relation Age of Onset   • Heart Disease Father      REVIEW OF SYSTEMS:  A ROS was completed.  Pertinent positives and negatives were included in the HPI, above.  All other systems were reviewed and are negative.    PHYSICAL EXAM:  General/Medical:  - NAD  - hair, skin, nails, and joints were normal  - heart rate and rhythm were regular    Neuro:  MENTAL STATUS: awake and alert; no deficits of speech or language; oriented to person, place, and time; affect was appropriate to situation; pleasant, cooperative    CRANIAL NERVES:    II: acuity was: J5/J5; fields intact to confrontation; pupils 3/3 to 2/2 without a relative afferent pupillary defect; discs sharp    III/IV/VI: versions intact without nystagmus; pursuits were smooth    V: facial sensation symmetric to light touch    VII: facial expression symmetric    VIII: hearing intact to finger rub    IX/X: palate elevates symmetrically    XI: shoulder shrug symmetric    XII: tongue midline    MOTOR:  - bulk and tone were normal throughout  Upper Extremity Strength  (R/L)    5/5   Elbow flexion 5/5   Elbow extension 5/5   Shoulder abduction 5/5     Lower Extremity Strength  (R/L)  "  Hip flexion 5/5   Knee extension 5/5   Knee flexion 5/5   Ankle plantarflexion 5/5   Ankle dorsiflexion 5/5     - can walk on toes and heels  - no pronator drift; no abnormal movements    SENSATION:  - light touch: intact over the upper and lower extremities  - vibration (R/L, seconds): NT at the great toes  - pinprick: NT  - proprioception: NT  - Romberg: absent    COORDINATION:  - finger to nose was normal, no ataxia on exam  - finger tapping was rapid and accurate, bilaterally    REFLEXES:  Reflex Right Left   BR 1+ 1+   Biceps 1+ 1+   Triceps 1+ 1+   Patellae 1+ 1+   Achilles 1+ 1+   Toes NT NT     GAIT:  - narrow base and normal  - heel-raised and toe-raised gait: intact  - tandem gait: intact    REVIEW OF IMAGING STUDIES: I reviewed the following studies:  MRI Brain:  Date: 10/2/2019  W/o and w/ contrast?: without  Indication: \"headache, acute, normal neuro exam; intractable headache, new headaches\"  Comparison: none  Impression:  \"MRI of the brain without contrast within normal limits.\"    REVIEW OF LABORATORY STUDIES:  10/19/2020:  - CBC w/ diff: WNL  - Chem Panel: notable for Creatinine: 1.43    ASSESSMENT:  Luan Schmidt is a 65 y.o. man with probable primary cough headache and a history most notable for HTN, HLD, CKD, insomnia, and anxiety.  Luan seems to fulfill ICHD-3 criteria for \"probable primary cough headache.\"  He has had at least 2 headache episodes brought on by or occurring only in association with coughing, straining and/or other Valsalva maneuver which were sudden onset.  Furthermore, I don't have a better explanation for this headache disorder.  Plan to obtain MRI head w/o and w/ contrast as well as vessel imaging of the head to assess for CNS tumors and aneurysms, respectively.    PLAN:  Probable Primary Cough Headache:  - MRI head w/o and w/ contrast  - CTA head and neck    Follow-Up:  - Return in about 2 months (around 1/3/2021).     Signed: Ayden Bernardo M.D. at 3:12 PM on " 11/03/20

## 2020-12-11 ENCOUNTER — HOSPITAL ENCOUNTER (OUTPATIENT)
Dept: LAB | Facility: MEDICAL CENTER | Age: 65
End: 2020-12-11
Attending: INTERNAL MEDICINE
Payer: COMMERCIAL

## 2020-12-11 DIAGNOSIS — M10.9 GOUT OF FOOT, UNSPECIFIED CAUSE, UNSPECIFIED CHRONICITY, UNSPECIFIED LATERALITY: ICD-10-CM

## 2020-12-11 DIAGNOSIS — N18.31 STAGE 3A CHRONIC KIDNEY DISEASE: ICD-10-CM

## 2020-12-11 DIAGNOSIS — E03.9 HYPOTHYROIDISM, UNSPECIFIED TYPE: Chronic | ICD-10-CM

## 2020-12-11 DIAGNOSIS — E78.5 DYSLIPIDEMIA: Chronic | ICD-10-CM

## 2020-12-11 LAB
ALBUMIN SERPL BCP-MCNC: 4.3 G/DL (ref 3.2–4.9)
ALBUMIN/GLOB SERPL: 1.4 G/DL
ALP SERPL-CCNC: 88 U/L (ref 30–99)
ALT SERPL-CCNC: 38 U/L (ref 2–50)
ANION GAP SERPL CALC-SCNC: 9 MMOL/L (ref 7–16)
AST SERPL-CCNC: 26 U/L (ref 12–45)
BASOPHILS # BLD AUTO: 0.7 % (ref 0–1.8)
BASOPHILS # BLD: 0.05 K/UL (ref 0–0.12)
BILIRUB SERPL-MCNC: 0.3 MG/DL (ref 0.1–1.5)
BUN SERPL-MCNC: 14 MG/DL (ref 8–22)
CALCIUM SERPL-MCNC: 9.6 MG/DL (ref 8.5–10.5)
CHLORIDE SERPL-SCNC: 105 MMOL/L (ref 96–112)
CHOLEST SERPL-MCNC: 217 MG/DL (ref 100–199)
CO2 SERPL-SCNC: 26 MMOL/L (ref 20–33)
CREAT SERPL-MCNC: 1.01 MG/DL (ref 0.5–1.4)
EOSINOPHIL # BLD AUTO: 0.1 K/UL (ref 0–0.51)
EOSINOPHIL NFR BLD: 1.4 % (ref 0–6.9)
ERYTHROCYTE [DISTWIDTH] IN BLOOD BY AUTOMATED COUNT: 42.9 FL (ref 35.9–50)
FASTING STATUS PATIENT QL REPORTED: NORMAL
GLOBULIN SER CALC-MCNC: 3.1 G/DL (ref 1.9–3.5)
GLUCOSE SERPL-MCNC: 98 MG/DL (ref 65–99)
HCT VFR BLD AUTO: 46 % (ref 42–52)
HDLC SERPL-MCNC: 38 MG/DL
HGB BLD-MCNC: 15.1 G/DL (ref 14–18)
IMM GRANULOCYTES # BLD AUTO: 0.03 K/UL (ref 0–0.11)
IMM GRANULOCYTES NFR BLD AUTO: 0.4 % (ref 0–0.9)
LDLC SERPL CALC-MCNC: 114 MG/DL
LYMPHOCYTES # BLD AUTO: 2.04 K/UL (ref 1–4.8)
LYMPHOCYTES NFR BLD: 28.6 % (ref 22–41)
MCH RBC QN AUTO: 31.3 PG (ref 27–33)
MCHC RBC AUTO-ENTMCNC: 32.8 G/DL (ref 33.7–35.3)
MCV RBC AUTO: 95.4 FL (ref 81.4–97.8)
MONOCYTES # BLD AUTO: 0.62 K/UL (ref 0–0.85)
MONOCYTES NFR BLD AUTO: 8.7 % (ref 0–13.4)
NEUTROPHILS # BLD AUTO: 4.29 K/UL (ref 1.82–7.42)
NEUTROPHILS NFR BLD: 60.2 % (ref 44–72)
NRBC # BLD AUTO: 0 K/UL
NRBC BLD-RTO: 0 /100 WBC
PHOSPHATE SERPL-MCNC: 3.5 MG/DL (ref 2.5–4.5)
PLATELET # BLD AUTO: 213 K/UL (ref 164–446)
PMV BLD AUTO: 10.6 FL (ref 9–12.9)
POTASSIUM SERPL-SCNC: 4.6 MMOL/L (ref 3.6–5.5)
PROT SERPL-MCNC: 7.4 G/DL (ref 6–8.2)
PTH-INTACT SERPL-MCNC: 35.7 PG/ML (ref 14–72)
RBC # BLD AUTO: 4.82 M/UL (ref 4.7–6.1)
SODIUM SERPL-SCNC: 140 MMOL/L (ref 135–145)
TRIGL SERPL-MCNC: 326 MG/DL (ref 0–149)
TSH SERPL DL<=0.005 MIU/L-ACNC: 0.72 UIU/ML (ref 0.38–5.33)
URATE SERPL-MCNC: 6.6 MG/DL (ref 2.5–8.3)
WBC # BLD AUTO: 7.1 K/UL (ref 4.8–10.8)

## 2020-12-11 PROCEDURE — 84443 ASSAY THYROID STIM HORMONE: CPT

## 2020-12-11 PROCEDURE — 85025 COMPLETE CBC W/AUTO DIFF WBC: CPT

## 2020-12-11 PROCEDURE — 80061 LIPID PANEL: CPT

## 2020-12-11 PROCEDURE — 84550 ASSAY OF BLOOD/URIC ACID: CPT

## 2020-12-11 PROCEDURE — 36415 COLL VENOUS BLD VENIPUNCTURE: CPT

## 2020-12-11 PROCEDURE — 83970 ASSAY OF PARATHORMONE: CPT

## 2020-12-11 PROCEDURE — 80053 COMPREHEN METABOLIC PANEL: CPT

## 2020-12-11 PROCEDURE — 84100 ASSAY OF PHOSPHORUS: CPT

## 2020-12-15 ENCOUNTER — OFFICE VISIT (OUTPATIENT)
Dept: MEDICAL GROUP | Facility: MEDICAL CENTER | Age: 65
End: 2020-12-15
Payer: COMMERCIAL

## 2020-12-15 VITALS
BODY MASS INDEX: 35.25 KG/M2 | OXYGEN SATURATION: 97 % | TEMPERATURE: 98.4 F | HEART RATE: 69 BPM | WEIGHT: 266 LBS | SYSTOLIC BLOOD PRESSURE: 158 MMHG | HEIGHT: 73 IN | DIASTOLIC BLOOD PRESSURE: 84 MMHG

## 2020-12-15 DIAGNOSIS — E78.5 DYSLIPIDEMIA: Chronic | ICD-10-CM

## 2020-12-15 DIAGNOSIS — Z00.00 PREVENTATIVE HEALTH CARE: Chronic | ICD-10-CM

## 2020-12-15 DIAGNOSIS — J30.1 ALLERGIC RHINITIS DUE TO POLLEN, UNSPECIFIED SEASONALITY: ICD-10-CM

## 2020-12-15 DIAGNOSIS — N18.31 STAGE 3A CHRONIC KIDNEY DISEASE: ICD-10-CM

## 2020-12-15 DIAGNOSIS — Z23 NEED FOR SHINGLES VACCINE: ICD-10-CM

## 2020-12-15 DIAGNOSIS — Z23 NEEDS FLU SHOT: ICD-10-CM

## 2020-12-15 PROBLEM — J30.9 ALLERGIC RHINITIS: Status: ACTIVE | Noted: 2020-12-15

## 2020-12-15 PROCEDURE — 99214 OFFICE O/P EST MOD 30 MIN: CPT | Mod: 25 | Performed by: INTERNAL MEDICINE

## 2020-12-15 PROCEDURE — 90686 IIV4 VACC NO PRSV 0.5 ML IM: CPT | Performed by: INTERNAL MEDICINE

## 2020-12-15 PROCEDURE — 90750 HZV VACC RECOMBINANT IM: CPT | Performed by: INTERNAL MEDICINE

## 2020-12-15 PROCEDURE — 90472 IMMUNIZATION ADMIN EACH ADD: CPT | Performed by: INTERNAL MEDICINE

## 2020-12-15 PROCEDURE — 90471 IMMUNIZATION ADMIN: CPT | Performed by: INTERNAL MEDICINE

## 2020-12-15 RX ORDER — OMEGA-3-ACID ETHYL ESTERS 1 G/1
1 CAPSULE, LIQUID FILLED ORAL 2 TIMES DAILY
Qty: 120 CAP | Refills: 11 | Status: SHIPPED | OUTPATIENT
Start: 2020-12-15 | End: 2020-12-22

## 2020-12-15 RX ORDER — FLUTICASONE PROPIONATE 50 MCG
1 SPRAY, SUSPENSION (ML) NASAL 2 TIMES DAILY
Qty: 16 G | Refills: 11 | Status: SHIPPED | OUTPATIENT
Start: 2020-12-15 | End: 2021-11-15

## 2020-12-15 ASSESSMENT — FIBROSIS 4 INDEX: FIB4 SCORE: 1.27

## 2020-12-15 NOTE — PROGRESS NOTES
Subjective:      Luan Schmidt is a 65 y.o. male who presents with follow-up labs, headache        HPI         Dyslipidemia, currently on Lipitor most recent labs 12/11/20 chol 217,trig 326,hdl 38,ldl 114 on lipitor 20 mg, has been having a headache for the past year, seen by neurology November 3, diagnosed primary cough headache, imaging ordered by neurology including MRI with and without, CT-CTA head and neck.  Does complain of sneezing, dry cough, tried Claritin but that made his headache worse.  Has history of esophagitis, has seen GI, has had EGD, no reflux symptoms.  History of abnormal creatinine on previous labs, follow-up labs December 12 normal GFR and creatinine.  Off indomethacin.  Hypertension on lisinopril and Toprol  Hypothyroid on levothyroxine 175 mcg daily, TSH 0.72 on December 11  Headache worse with sneezing  Seen by neurology, MRI with and without, CT-CTA head and neck ordered.  Patient has not been contacted by imaging yet.        Current Outpatient Medications   Medication Sig Dispense Refill   • allopurinol (ZYLOPRIM) 300 MG Tab Take 1 Tab by mouth every day. 90 Tab 1   • lisinopril (PRINIVIL) 10 MG Tab TAKE 1 TABLET BY MOUTH  EVERY DAY 90 Tab 3   • metoprolol SR (TOPROL XL) 100 MG TABLET SR 24 HR TAKE 1 TABLET BY MOUTH  EVERY DAY 90 Tab 1   • levothyroxine (SYNTHROID) 175 MCG Tab TAKE 1 TABLET BY MOUTH  EVERY DAY 90 Tab 1   • sertraline (ZOLOFT) 50 MG Tab TAKE 1 AND 1/2 TABLETS BY  MOUTH EVERY  Tab 1   • atorvastatin (LIPITOR) 20 MG Tab TAKE 1 TABLET BY MOUTH  EVERY DAY 90 Tab 1   • tadalafil (CIALIS) 5 MG tablet Take 1 Tab by mouth every day. 90 Tab 3   • aspirin (ASA) 81 MG CHEW Take 1 Tab by mouth every day. 100 Each 11     No current facility-administered medications for this visit.             Anxiety  8/19/16 RONALD score 10, PHQ 4, start zoloft 50 mg, declines psychotherapy  10/16/17 on zoloft 75 mg   12/3/19 on zoloft 75 mg add pamelor 10 mg for headache  8/31/20 on  zoloft 75 mg off pamelor      Dyslipidemia  3/09 chol  299, trig 367,hdl 47, ldl 179 started on simcor no followup testing   6/10 chol 278,trig 366,hdl 51,ldl 154 add fish oil 2 pills bid, will not increase simcor due to hx gout  4/11 off simcor  4/11 chol 215,trig 232,hdl 39,ldl 130 off meds  12/31/11 chol 245,trig 240,hdl 48,ldl 149 off meds  10/12 chol 268,trig 239,hdl 54,ldl 166 start fish oil two pills daily  11/29/13 chol 265,trig 229,hdl 46,ldl 168  12/19/13 start lipitor 20 mg  3/27/14 chol 190,trig 164,hdl 51,ldl 106 on lipitor 20 mg  8/29/14 chol 208,trig 280,hdl 51,ldl 101 on lipitor 20 mg  10/15/15 chol 187,trig 223,hdl 46,ldl 96 on lipitor 20 mg  9/16/16 chol 182,trig 262,hdl 46,ldl 84 on lipitor 20 mg  10/4/17 chol 221,trig 324,hdl 51,ldl 105 on lipitor 20 mg  7/2/19 chol 216,trig 312,hdl 41,ldl 113 on lipitor 20 mg  10/20/20 chol 266,trig 475,hdl 43 on lipitor 20 mg  10/28/20 declines fibrate  12/11/20 chol 217,trig 326,hdl 38,ldl 114 on lipitor 20 mg    gout  On no medication for over 2 years  6/10 uric 9.6 monitor for gout flare up since on simcor  4/18/11 left elbow pain question gout flareup  4/11 uric 7.1  12/11 uric 7.0  1/17/12 gout flare up  10/12 uric 7.8  11/29/13 uric 7.9  8/29/14 uric 7.9  10/15/15 uric 8.8  9/16/16 uric 7.7  10/4/17 uric 8.7  7/2/19 uric 9.4  7/8/19 declines allopurinol  10/20/20 uric 9.7  10/28/20 start allopurinol 300 mg repeat uric and labs 4 weeks  12/11/20 uric 6.6 on allopurinol 300 mg     Erectile dysfunction  12/20/11 sample cialis 10 mg and 5 mg  3/17/14 change cialis to 2.5 mg cost  8/26/14 samples cialis 5 mg  9/16/16 testosterone 234      headache  10/2/19 MRI brain negative  12/3/19 headache, question neuralgia, trial of pamelor 10 mg  12/23/19 increase pamelor to 20 mg  1/30/20 on pamelor 10 mg effective  8/31/20 orthostatic headache, referral neurology, trial of gabapentin, MRI already done, consider MRI with gadolinium  10/20/20 ESR 3  11/3/20   neurology note primary cough headache, obtain MRI head with and without, CTA head and follow up 2 months       Hearing loss  9/12  audiology note mild high-frequency sensorineural loss right ear, brainstem response normal    History decreased GFR  6/21/10 bun 19,creat 0.98  10/19/12 bun 16,creat 0.90  11/27/13 bun 13,creat 0.98  8/28/14 bun 22,creat 1.0  9/16/16 bun 15,creat 1.04  10/3/17 bun 21,creat 1.04  7/2/19 bun 28,creat 1.38,GFR 52  10/19/20 bun 32,creat 1.43,GFR 50, PTH 34, SPEP negative,calcium 9.8,vit d 38, hep c ab negative  12/11/20 bun 14,creat 1.0,GFR>60,PTH 36,calcium 9.6,phos 3.5    history esophagitis  10/9/15 dyspepsia, chest x-ray, ultrasound, stress echo pending  10/29/15 ultrasound enlarged liver increased echotexture fatty infiltration or hepatocellular disease   11/31/15 TSH ordered for next month and labs for peripheral neuropathy feet, declines MRI lumbar spine repeat EMG NCS for now, cont limit etoh  11/19/15 stress echo mild concentric LVH on baseline echo, excellent exercise tolerance, negative stress echo for ischemia, brenna protocol 11 minutes 48 seconds  11/19/15 cxr negative  2/28/16 [facility] consultation note, solid food dysphagia, will arrange for EGD  3/8/18 EGD per [facility] grade B esophagitis and gastritis, biopsy performed, take prilosec 20 mg daily   3/23/18 [facility] note dysphagia, recent EGD grade B esophagitis, biopsies negative, swelling has improved, did not start omeprazole, unsure if this is hypertonic UES versus cricopharyngeal achalasia or hypertrophy given lack of eosinophilic esophagitis or schatzki's ring, recommend trial omeprazole for 2 months then wean off if possible     Hypertension  6/14/10 add lisinopril 10 mg to toprol  mg  3/15/13 treadmill thallium, treadmill component 10 minutes 48 seconds 1 mm upsloping ST segment depression nondiagnostic, hypertensive response to exercise; ejection fraction 61%, no ischemia noted  11/29/13 urine mac < 2.4 on lisinopril  10 mg and toprol  mg  10/15/15 urine mac <0.5 on lisinopril 10 mg and toprol  mg  11/19/15 stress echo mild concentric LVH on baseline echo, excellent exercise tolerance, negative stress echo for ischemia, brenna protocol 11 minutes 48 seconds  9/16/16 urine mac<0.7 on lisinopril 10 mg and toprol  mg  10/4/17 urine mac 8.7 on lisinopril 10 mg and toprol  mg  2/8/18 treadmill stress test, brenna protocol 10 minutes, 12.8 METS, target heart rate achieved, no chest pain, no t-wave changes, normal stress EKG  12/3/19 increase lisinopril to 20 mg daily and continue toprol  mg  10/21/20 urine mac<1.2 on lisinopril 20 mg and toprol  mg     Hypothyroid  3/09 tsh 7.5 at that time had been on 0.2 mg half a tablet plus 0.125 for a total of 0.225 daily, this was changed to 0.2 mg one tablet po every Tuesday Thursday Saturday Sunday and 0.2 mg 1-1/2 tablets on Monday Wednesday Friday  6/10 tsh 0.148  6/23/10 change to levothyroxine 0.2 daily repeat in 6 weeks  8/27/10 tsh 2.7   4/11 tsh 0.6 on levothyroxine 200 mcg  10/12 tsh 27 on levothyroxine 200 mcg but not taking regularly  11/29/13 tsh 8.6 on levothyroxine 200 mcg daily  12/19/13 take levothyroxine 200 mg before eating  3/27/14 tsh 0.8 on levothyroxine 200 mcg  8/29/14 tsh 8.3 on levothyroxine 200 mcg taking thyroid med with supplements in am he will try to change to taking medication separate from everything, repeat labs 6 weeks  10/15/15 tsh 0.12 on levothyroxine 200 mcg daily change to levothyroxine 175 mcg  3/23/16 tsh 1.1 on levothyroxine 175 mcg  9/16/16 tsh 0.1 on levothyroxine 175 mcg change to one po six days per week, repeat tsh 6 weeks  1/30/17 tsh 1.3 on levothyroxine 175 mcg six days per week  10/4/17 tsh 2.1 on levothyroxine 175 mcg six days per week  7/2/19 tsh 8.6 on levothyroxine 175 mcg six days per week  7/8/19 change to levothyroxine 175 mcg daily repeat tsh 6 weeks  10/20/20 tsh 7.0 on levothyroxine 175 mcg taking six  days per week, will change to daily  12/11/20 tsh 0.72 on levothyroxine 175 mcg daily       Impaired glucose metabolism  7/2/19 A1c 5.9%  10/20/20 A1c 5.4%     Insomnia  8/26/14 trial ambien 10 mg  9/7/17 continues on ambien, declines sleep psychology evaluation, work on taper     Peripheral neuropathy  10/27/12 right foot neuropathy labs tsh 27, MAXIMUS,SPEP,folate,B12,B1,ESR negative, if negative consider EMG NCS  10/29/12 pt states not taking levothyroxine 200 mcg reg, will start daily and repeat labs tsh and lipid in 6 weeks  12/24/12 no improvement in symptoms when taking levothyroxine regularly, has decreased etoh  2/13 EMG NCS normal motor and sensory nerve conduction studies bilateral lower extremities     Preventive health  12/30/11 tdap  2/29/12 colon per [facility] repeat in 10 yrs  8/26/14 zoster vaccine  6/27/19 declines prostate and rectal exam  8/22/19 cologuard negative  10/20/20 vit d 38  10/20/20 hep c ab negative  10/20/20 psa 0.44    Renal cyst  10/29/15 ultrasound enlarged liver increased echotexture fatty infiltration or hepatocellular disease, 3.2 cm probable complicated cystic exophytic lesion right kidney  11/30/15 MRI abdomen with and without; posterior lateral exophytic right kidney lesion 2.6 x 1.8 x 2.9 cm, no internal enhancement, consistent with benign cyst, also incidental exophytic 13 mm right renal cyst, no followup is necessary          Health Maintenance Summary                IMM ZOSTER VACCINES Next Due 2/9/2021      Done 12/15/2020 Imm Admin: Zoster Vaccine Recombinant (RZV) (SHINGRIX)     Patient has more history with this topic...    IMM DTaP/Tdap/Td Vaccine Next Due 12/30/2021      Done 12/30/2011 Imm Admin: Tdap Vaccine    COLONOSCOPY Next Due 2/28/2022      Done 2/29/2012           Patient Care Team:  Luis E Rutherfrod M.D. as PCP - General       Patient Active Problem List   Diagnosis   • Hypothyroid   • Dyslipidemia   • Rosacea   • Hypertension   • Gout   • S/P appendectomy   •  Preventative health care   • Erectile dysfunction   • Hearing loss   • Peripheral neuropathy   • Obesity (BMI 30-39.9)   • Insomnia   • Renal cyst   • History of esophagitis   • Anxiety   • Impaired glucose metabolism   • Headache   • History of decreased GFR          Health Maintenance Summary                IMM ZOSTER VACCINES Overdue 10/21/2014      Done 8/26/2014 Imm Admin: Zoster Vaccine Live (ZVL) (Zostavax) - HISTORICAL DATA    IMM INFLUENZA Overdue 9/1/2020      Done 12/3/2019 Imm Admin: Influenza Vaccine Quad Inj (Pf)     Patient has more history with this topic...    IMM DTaP/Tdap/Td Vaccine Next Due 12/30/2021      Done 12/30/2011 Imm Admin: Tdap Vaccine    COLONOSCOPY Next Due 2/28/2022      Done 2/29/2012           Patient Care Team:  Luis E Rutherford M.D. as PCP - General    ROS       Objective:        Physical Exam  Vitals signs and nursing note reviewed.   Constitutional:       Appearance: Normal appearance.   HENT:      Head: Normocephalic and atraumatic.   Eyes:      Conjunctiva/sclera: Conjunctivae normal.   Neck:      Musculoskeletal: Neck supple.   Cardiovascular:      Rate and Rhythm: Normal rate and regular rhythm.      Heart sounds: Normal heart sounds. No murmur.   Pulmonary:      Effort: No respiratory distress.      Breath sounds: Normal breath sounds.   Abdominal:      General: There is no distension.   Skin:     General: Skin is warm.   Neurological:      General: No focal deficit present.      Mental Status: He is alert.   Psychiatric:         Mood and Affect: Mood normal.         Behavior: Behavior normal.                 Assessment/Plan:        Assessment  #!  Dyslipidemia most recent labs 12/11/20 chol 217,trig 326,hdl 38,ldl 114 on lipitor 20 mg      #2 cough related headache, seen by neurology November 3 recommended MRI brain with and without, CT-CTA    #3 hypertension on Toprol lisinopril    #4 hypothyroid on levothyroxine 75 mcg daily, TSH 0.72 on December 11    #5 allergic  rhinitis tried claritin made headache worse, does have sneezing     #6 BMI 35.6    #7 CKD resolved after stopping Indocin, most recent labs reviewed normal GFR      Plan  #1 recommend add lovaza for hypertriglyceridemia, repeat labs 8 months slip provided    #2 continue Lipitor 20 mg    #3 continue work on nutrition, diet, exercise    #4 reviewed laboratory testing from December 11    #5 trial of flonase for sneezing    #6 shingrix first in series today, he will get second in series in 2 months he can call our office first    #7 flu vaccine today    #8 labs fasting labs 4 weeks     #9 gave patient the number to call imaging scheduling to have CT-CTA head and neck, MRI brain with and without as ordered by neurology    #10 continue off indomethacin and nsaids

## 2020-12-16 ENCOUNTER — TELEPHONE (OUTPATIENT)
Dept: MEDICAL GROUP | Facility: MEDICAL CENTER | Age: 65
End: 2020-12-16

## 2020-12-16 NOTE — TELEPHONE ENCOUNTER
Need PAR  (1) lovaza capsule 1 gm take two bid   (2) #120 per 30 days  (3) dyslipidemia   (4) ICD code: E78.1  (5) comments: He is already on lipitor but his triglycerides are still elevated at 326, so we need to add lovaza since we are avoiding the fibrate statin combination due to renal disease

## 2020-12-22 DIAGNOSIS — E78.5 DYSLIPIDEMIA: Chronic | ICD-10-CM

## 2020-12-22 RX ORDER — ICOSAPENT ETHYL 1000 MG/1
2 CAPSULE ORAL
Qty: 360 CAP | Refills: 3 | Status: SHIPPED | OUTPATIENT
Start: 2020-12-22 | End: 2021-06-14

## 2021-01-12 DIAGNOSIS — G44.83 PRIMARY COUGH HEADACHE: Primary | ICD-10-CM

## 2021-02-17 ENCOUNTER — APPOINTMENT (OUTPATIENT)
Dept: RADIOLOGY | Facility: MEDICAL CENTER | Age: 66
End: 2021-02-17
Attending: PSYCHIATRY & NEUROLOGY
Payer: COMMERCIAL

## 2021-02-26 ENCOUNTER — APPOINTMENT (OUTPATIENT)
Dept: RADIOLOGY | Facility: MEDICAL CENTER | Age: 66
End: 2021-02-26
Attending: PSYCHIATRY & NEUROLOGY
Payer: COMMERCIAL

## 2021-02-26 DIAGNOSIS — G44.83 PRIMARY COUGH HEADACHE: ICD-10-CM

## 2021-02-26 PROCEDURE — 70553 MRI BRAIN STEM W/O & W/DYE: CPT

## 2021-02-26 PROCEDURE — A9576 INJ PROHANCE MULTIPACK: HCPCS | Performed by: PSYCHIATRY & NEUROLOGY

## 2021-02-26 PROCEDURE — 700117 HCHG RX CONTRAST REV CODE 255: Performed by: PSYCHIATRY & NEUROLOGY

## 2021-02-26 RX ADMIN — GADOTERIDOL 20 ML: 279.3 INJECTION, SOLUTION INTRAVENOUS at 09:28

## 2021-03-19 NOTE — TELEPHONE ENCOUNTER
March 19, 2021     Oziel Courtney, [address]    Patient: Irene Sanford   YOB: 1954   Date of Visit: 3/19/2021       Dear Dr Garzon Prophet:    Thank you for referring Krzysztof John to me for evaluation  Below are the relevant portions of my assessment and plan of care  Patient with bilateral lower extremity venous insufficiency marked by multiple varicosities at the lateral thighs and ankles bilaterally  Patient has been wearing compression stocking therapy since October of 2020  Venous valvular insufficiency study performed 11/12/2020 reveals incompetence of the great saphenous veins bilaterally  Patient tells me the compression stocking therapy has provided benefit  I encouraged her to continue with stocking use, moderate exercise and leg elevation while at rest   Patient mostly complains of symptoms of the varicosities at her ankle which I explained her would require sclerotherapy  I have informed her Dr Karo Cueva and our Leyla Julien 3 perform sclerotherapy the [facility]  She was also given the contact info for Dr Thomas Haskins here in [place]  If you have questions, please do not hesitate to call me  I look forward to following Liz Bowers along with you           Sincerely,        Blanka Dudley MD        CC: No Recipients Was the patient seen in the last year in this department? Yes     Does patient have an active prescription for medications requested? No     Received Request Via: Patient     Patient called requesting Zoloft due to anxiety. Pt states he had a Rx for this but it has .

## 2021-03-28 PROBLEM — D32.9 MENINGIOMA (HCC): Status: ACTIVE | Noted: 2021-03-28

## 2021-04-01 ENCOUNTER — TELEMEDICINE (OUTPATIENT)
Dept: MEDICAL GROUP | Facility: MEDICAL CENTER | Age: 66
End: 2021-04-01
Payer: COMMERCIAL

## 2021-04-01 VITALS — HEIGHT: 73 IN | BODY MASS INDEX: 35.12 KG/M2 | WEIGHT: 265 LBS | TEMPERATURE: 98.6 F

## 2021-04-01 DIAGNOSIS — J30.1 ALLERGIC RHINITIS DUE TO POLLEN, UNSPECIFIED SEASONALITY: ICD-10-CM

## 2021-04-01 DIAGNOSIS — D32.9 MENINGIOMA (HCC): ICD-10-CM

## 2021-04-01 DIAGNOSIS — E78.5 DYSLIPIDEMIA: ICD-10-CM

## 2021-04-01 DIAGNOSIS — M10.9 GOUT OF FOOT, UNSPECIFIED CAUSE, UNSPECIFIED CHRONICITY, UNSPECIFIED LATERALITY: ICD-10-CM

## 2021-04-01 PROCEDURE — 99214 OFFICE O/P EST MOD 30 MIN: CPT | Mod: 95,CR | Performed by: INTERNAL MEDICINE

## 2021-04-01 ASSESSMENT — FIBROSIS 4 INDEX: FIB4 SCORE: 1.27

## 2021-04-01 ASSESSMENT — PATIENT HEALTH QUESTIONNAIRE - PHQ9: CLINICAL INTERPRETATION OF PHQ2 SCORE: 0

## 2021-04-01 NOTE — PROGRESS NOTES
Subjective:      Virtual Visit: Established Patient   This visit was conducted via Zoom using secure and encrypted videoconferencing technology. The patient was in a private location in the state of [place].    The patient's identity was confirmed and verbal consent was obtained for this virtual visit.    Subjective:   CC: follow up headaches  Chief Complaint   Patient presents with   • Follow-Up     headaches       Luan Schmidt is a 65 y.o. male presenting for evaluation and management of:  Headaches     Medications, allergies, medical history, surgical history, social history reviewed and updated  Has had MRI head per neurology ordered by neurology, he was not contacted about that result.  He has had chronic headaches, also neurology did order CT scans but he was not contacted to schedule those. Allergies and post nasal drip has tried sudafed but that can raise blood pressure. Has tried astelin which has not been beneficial.  Dyslipidemia remains on Lipitor most recent labs 12/11/20 chol 217,trig 326,hdl 38,ldl 114 on lipitor 20 mg with consider trying fish oil tablet prescription but lovaza not covered on his plan.  Gout on allopurinol and has more gout flareups of ankle both sides with no changes in diet or etoh intake.  Remains on allopurinol 300 mg, last uric acid level in December was 6.6.  Hypertension, not checking blood pressures on a regular basis, remains on lisinopril and Toprol.        No Known Allergies    Current medicines (including changes today)  Current Outpatient Medications   Medication Sig Dispense Refill   • sertraline (ZOLOFT) 50 MG Tab TAKE 1 AND 1/2 TABLETS BY  MOUTH DAILY 135 tablet 3   • metoprolol SR (TOPROL XL) 100 MG TABLET SR 24 HR TAKE 1 TABLET BY MOUTH  DAILY 90 tablet 3   • levothyroxine (SYNTHROID) 175 MCG Tab TAKE 1 TABLET BY MOUTH  DAILY 90 tablet 3   • atorvastatin (LIPITOR) 20 MG Tab TAKE 1 TABLET BY MOUTH  DAILY 90 tablet 3   • azelastine (ASTELIN) 137 MCG/SPRAY  nasal spray Administer 1 Spray into affected nostril(S) 2 times a day. 48 mL 6   • Icosapent Ethyl (VASCEPA) 1 g Cap Take 2 g by mouth 2 (two) times a day. 360 Cap 3   • fluticasone (FLONASE) 50 MCG/ACT nasal spray Administer 1 Spray into affected nostril(S) 2 times a day. 16 g 11   • allopurinol (ZYLOPRIM) 300 MG Tab Take 1 Tab by mouth every day. 90 Tab 1   • lisinopril (PRINIVIL) 10 MG Tab TAKE 1 TABLET BY MOUTH  EVERY DAY 90 Tab 3   • tadalafil (CIALIS) 5 MG tablet Take 1 Tab by mouth every day. 90 Tab 3   • aspirin (ASA) 81 MG CHEW Take 1 Tab by mouth every day. 100 Each 11     No current facility-administered medications for this visit.       Patient Active Problem List    Diagnosis Date Noted   • Meningioma (HCC) 03/28/2021   • Allergic rhinitis 12/15/2020   • History of decreased GFR 10/26/2020   • Headache 12/03/2019   • Impaired glucose metabolism 07/03/2019   • Anxiety 08/19/2016   • History of esophagitis 02/06/2016   • Renal cyst 10/29/2015   • Obesity (BMI 30-39.9) 08/26/2014   • Insomnia 08/26/2014   • Peripheral neuropathy 12/24/2012   • Hearing loss 09/17/2012   • Erectile dysfunction 12/30/2011   • Hypothyroid 08/23/2009   • Dyslipidemia 08/23/2009   • Rosacea 08/23/2009   • Hypertension 08/23/2009   • Gout 08/23/2009   • S/P appendectomy 08/23/2009   • Preventative health care 08/23/2009     Depression Screening    Little interest or pleasure in doing things?  0 - not at all  Feeling down, depressed , or hopeless? 0 - not at all  Patient Health Questionnaire Score: 0          Health Maintenance Summary                COVID-19 Vaccine Overdue 5/29/1971     IMM ZOSTER VACCINES Overdue 2/9/2021      Done 12/15/2020 Imm Admin: Zoster Vaccine Recombinant (RZV) (SHINGRIX)     Patient has more history with this topic...    IMM DTaP/Tdap/Td Vaccine Next Due 12/30/2021      Done 12/30/2011 Imm Admin: Tdap Vaccine    COLONOSCOPY Next Due 2/28/2022      Done 2/29/2012           Patient Care Team:  Luis E VILCHIS  "LUIS Rutherford as PCP - General      Family History   Problem Relation Age of Onset   • Heart Disease Father        He  has a past medical history of Dyslipidemia (8/23/2009), Hypertension, and Renal cyst (10/29/2015). He also has no past medical history of Ulcer.  He  has a past surgical history that includes appendectomy.       Objective:   Temp 37 °C (98.6 °F)   Ht 1.854 m (6' 1\")   Wt 120 kg (265 lb)   BMI 34.96 kg/m²     Physical Exam:   Constitutional: Alert, no distress, well-groomed.  Skin: No rashes in visible areas.  Eye: Round. Conjunctiva clear, lids normal. No icterus.   ENMT: Lips pink without lesions, good dentition, moist mucous membranes. Phonation normal.  Neck: No masses, no thyromegaly. Moves freely without pain.  Respiratory: Unlabored respiratory effort, no cough or audible wheeze  Psych: Alert and oriented x3, normal affect and mood.       Assessment and Plan:   The following treatment plan was discussed:   Assessment  #1 allergic rhinitis astelin not beneficial still with postnasal drip    #2 dyslipidemia 12/11/20 chol 217,trig 326,hdl 38,ldl 114 on lipitor 20 mg but lovaza not covered on his plan followed by neurology, CT scans pending    #3 Headaches  2/26/21 MRI brain with and without per neurology, 5 x 11 mm small enhancing extra-axial lesion base medial right frontal lobe likely incidental meningioma     #4 gout recent flare ups with no change remains on allopurinol 300 mg, his uric acid was at goal 6.6 in December but he has had subsequent breakthroughs with ankle pain, no change in diet, no increase in alcohol intake    #5 hypertension remains on lisinopril 20 mg and toprol  mg      Plan  #1 try otc zyrtec and flonase for allergies     #2 do not use sudafed, could also consider atrovent nasal but astelin not beneficial     #3 follow up neurology     #4 repeat uric acid level     #5 referral neurosurgery for meningioma seen on MRI    #6  Reviewed MRI brain results with him, " recommend follow-up neurology after the CT scans are done he can call imaging scheduling to have that done    #7 check blood pressure periodically and record    #8 repeat uric acid given recent gout flareups, as well as lipoprotein and CRP per patient request, continue allopurinol 300 mg daily

## 2021-06-14 ENCOUNTER — TELEPHONE (OUTPATIENT)
Dept: MEDICAL GROUP | Facility: MEDICAL CENTER | Age: 66
End: 2021-06-14

## 2021-06-14 DIAGNOSIS — M10.9 GOUT, UNSPECIFIED CAUSE, UNSPECIFIED CHRONICITY, UNSPECIFIED SITE: ICD-10-CM

## 2021-06-14 DIAGNOSIS — I10 ESSENTIAL HYPERTENSION: ICD-10-CM

## 2021-06-14 DIAGNOSIS — R45.86 MOOD DISTURBANCE: ICD-10-CM

## 2021-06-14 DIAGNOSIS — E78.5 DYSLIPIDEMIA: ICD-10-CM

## 2021-06-14 DIAGNOSIS — E03.9 HYPOTHYROIDISM, UNSPECIFIED TYPE: ICD-10-CM

## 2021-06-14 RX ORDER — METOPROLOL SUCCINATE 100 MG/1
100 TABLET, EXTENDED RELEASE ORAL DAILY
Qty: 90 TABLET | Refills: 3 | Status: SHIPPED | OUTPATIENT
Start: 2021-06-14 | End: 2021-08-10 | Stop reason: SDUPTHER

## 2021-06-14 RX ORDER — ATORVASTATIN CALCIUM 20 MG/1
20 TABLET, FILM COATED ORAL DAILY
Qty: 90 TABLET | Refills: 3 | Status: SHIPPED | OUTPATIENT
Start: 2021-06-14 | End: 2021-08-10 | Stop reason: SDUPTHER

## 2021-06-14 RX ORDER — LEVOTHYROXINE SODIUM 175 UG/1
175 TABLET ORAL
Qty: 90 TABLET | Refills: 3 | Status: SHIPPED | OUTPATIENT
Start: 2021-06-14 | End: 2021-08-10 | Stop reason: SDUPTHER

## 2021-06-14 RX ORDER — TADALAFIL 5 MG/1
5 TABLET ORAL DAILY
Qty: 90 TABLET | Refills: 3 | Status: SHIPPED | OUTPATIENT
Start: 2021-06-14 | End: 2021-07-08

## 2021-06-14 RX ORDER — ICOSAPENT ETHYL 1000 MG/1
2 CAPSULE ORAL
Qty: 360 CAPSULE | Refills: 3 | Status: SHIPPED | OUTPATIENT
Start: 2021-06-14 | End: 2021-08-10 | Stop reason: SDUPTHER

## 2021-06-14 RX ORDER — AZELASTINE 1 MG/ML
1 SPRAY, METERED NASAL 2 TIMES DAILY
Qty: 48 ML | Refills: 3 | Status: SHIPPED | OUTPATIENT
Start: 2021-06-14 | End: 2021-07-08

## 2021-06-14 RX ORDER — LISINOPRIL 10 MG/1
10 TABLET ORAL
Qty: 90 TABLET | Refills: 3 | Status: SHIPPED | OUTPATIENT
Start: 2021-06-14 | End: 2021-08-10 | Stop reason: SDUPTHER

## 2021-06-14 RX ORDER — INDOMETHACIN 50 MG/1
50 CAPSULE ORAL 3 TIMES DAILY PRN
Qty: 270 CAPSULE | Refills: 1 | Status: SHIPPED | OUTPATIENT
Start: 2021-06-14 | End: 2021-08-10 | Stop reason: SDUPTHER

## 2021-06-14 RX ORDER — ALLOPURINOL 300 MG/1
300 TABLET ORAL DAILY
Qty: 90 TABLET | Refills: 3 | Status: SHIPPED | OUTPATIENT
Start: 2021-06-14 | End: 2021-08-10 | Stop reason: SDUPTHER

## 2021-06-15 NOTE — TELEPHONE ENCOUNTER
----- Message from Hailey Eagle sent at 6/14/2021  7:57 AM PDT -----  Regarding: FW: Prescription Question  Contact: [phone]    ----- Message -----  From: Luan Schmidt  Sent: 6/13/2021  11:22 AM PDT  To: Shari Olsen  Subject: Prescription Question                            I am now enrolled in Medicare and need to change my prescriptions from [facility] to [facility].  I have attached a med list from [facility] for cross reference.  I will need to have prescriptions written for most but not all of those listed.  I also need to schedule an appointment to get bloodwork  if needed and to get the 2nd shingles vaccination.  Please confirm that you get this message.  [facility] Medicare Supplement Membership ID #[id number].  Plan G.  Electronic payer ID [id number].  [facility] RXBIN [id number].  RXPCN [id number].  RXGRP RXCVSD.  Issuer ([id number]): [id number].  ID  [id number].  Please let me know if you need additional information.

## 2021-07-08 DIAGNOSIS — I10 ESSENTIAL HYPERTENSION: ICD-10-CM

## 2021-07-26 ENCOUNTER — TELEPHONE (OUTPATIENT)
Dept: MEDICAL GROUP | Facility: MEDICAL CENTER | Age: 66
End: 2021-07-26

## 2021-07-26 DIAGNOSIS — I10 HYPERTENSION, ESSENTIAL: ICD-10-CM

## 2021-07-26 DIAGNOSIS — R35.0 URINARY FREQUENCY: ICD-10-CM

## 2021-07-26 DIAGNOSIS — I15.9 SECONDARY HYPERTENSION: ICD-10-CM

## 2021-07-27 NOTE — TELEPHONE ENCOUNTER
----- Message from Zee Lora, Med Ass't sent at 7/26/2021  7:24 AM PDT -----  Regarding: FW: Prescription Question  Contact: [phone]    ----- Message -----  From: Luan Schmidt  Sent: 7/24/2021   6:55 PM PDT  To: Shari Almeida [facility]  Subject: RE: Prescription Question                        Thank you.  I'm finally going to get the lab work done next week and will check on this as well.  Can you add a urine test?  I am experiencing some kidney pain for the last few weeks and would like to check for UTI and kidney function.  I believe that it had returned to normal once we stopped the Indomethacin.  Thanks.

## 2021-07-29 ENCOUNTER — HOSPITAL ENCOUNTER (OUTPATIENT)
Dept: LAB | Facility: MEDICAL CENTER | Age: 66
End: 2021-07-29
Attending: INTERNAL MEDICINE
Payer: MEDICARE

## 2021-07-29 DIAGNOSIS — I10 HYPERTENSION, ESSENTIAL: ICD-10-CM

## 2021-07-29 DIAGNOSIS — E03.9 HYPOTHYROIDISM, UNSPECIFIED TYPE: ICD-10-CM

## 2021-07-29 DIAGNOSIS — E78.5 DYSLIPIDEMIA: ICD-10-CM

## 2021-07-29 DIAGNOSIS — M10.9 GOUT, UNSPECIFIED CAUSE, UNSPECIFIED CHRONICITY, UNSPECIFIED SITE: ICD-10-CM

## 2021-07-29 PROBLEM — R73.09 IMPAIRED GLUCOSE METABOLISM: Status: RESOLVED | Noted: 2019-07-03 | Resolved: 2021-07-29

## 2021-07-29 LAB
ALBUMIN SERPL BCP-MCNC: 4.6 G/DL (ref 3.2–4.9)
ALBUMIN/GLOB SERPL: 1.6 G/DL
ALP SERPL-CCNC: 80 U/L (ref 30–99)
ALT SERPL-CCNC: 31 U/L (ref 2–50)
ANION GAP SERPL CALC-SCNC: 12 MMOL/L (ref 7–16)
AST SERPL-CCNC: 34 U/L (ref 12–45)
BASOPHILS # BLD AUTO: 0.4 % (ref 0–1.8)
BASOPHILS # BLD: 0.03 K/UL (ref 0–0.12)
BILIRUB SERPL-MCNC: 0.3 MG/DL (ref 0.1–1.5)
BUN SERPL-MCNC: 21 MG/DL (ref 8–22)
CALCIUM SERPL-MCNC: 9.3 MG/DL (ref 8.5–10.5)
CHLORIDE SERPL-SCNC: 103 MMOL/L (ref 96–112)
CHOLEST SERPL-MCNC: 227 MG/DL (ref 100–199)
CO2 SERPL-SCNC: 24 MMOL/L (ref 20–33)
CREAT SERPL-MCNC: 1.02 MG/DL (ref 0.5–1.4)
CREAT UR-MCNC: 110.9 MG/DL
EOSINOPHIL # BLD AUTO: 0.19 K/UL (ref 0–0.51)
EOSINOPHIL NFR BLD: 2.6 % (ref 0–6.9)
ERYTHROCYTE [DISTWIDTH] IN BLOOD BY AUTOMATED COUNT: 47.2 FL (ref 35.9–50)
FASTING STATUS PATIENT QL REPORTED: NORMAL
GLOBULIN SER CALC-MCNC: 2.8 G/DL (ref 1.9–3.5)
GLUCOSE SERPL-MCNC: 89 MG/DL (ref 65–99)
HCT VFR BLD AUTO: 48 % (ref 42–52)
HDLC SERPL-MCNC: 39 MG/DL
HGB BLD-MCNC: 15.4 G/DL (ref 14–18)
IMM GRANULOCYTES # BLD AUTO: 0.08 K/UL (ref 0–0.11)
IMM GRANULOCYTES NFR BLD AUTO: 1.1 % (ref 0–0.9)
LDLC SERPL CALC-MCNC: ABNORMAL MG/DL
LYMPHOCYTES # BLD AUTO: 2.65 K/UL (ref 1–4.8)
LYMPHOCYTES NFR BLD: 36.7 % (ref 22–41)
MCH RBC QN AUTO: 31.2 PG (ref 27–33)
MCHC RBC AUTO-ENTMCNC: 32.1 G/DL (ref 33.7–35.3)
MCV RBC AUTO: 97.4 FL (ref 81.4–97.8)
MICROALBUMIN UR-MCNC: <1.2 MG/DL
MICROALBUMIN/CREAT UR: NORMAL MG/G (ref 0–30)
MONOCYTES # BLD AUTO: 0.69 K/UL (ref 0–0.85)
MONOCYTES NFR BLD AUTO: 9.6 % (ref 0–13.4)
NEUTROPHILS # BLD AUTO: 3.58 K/UL (ref 1.82–7.42)
NEUTROPHILS NFR BLD: 49.6 % (ref 44–72)
NRBC # BLD AUTO: 0 K/UL
NRBC BLD-RTO: 0 /100 WBC
PLATELET # BLD AUTO: 225 K/UL (ref 164–446)
PMV BLD AUTO: 10.4 FL (ref 9–12.9)
POTASSIUM SERPL-SCNC: 4.7 MMOL/L (ref 3.6–5.5)
PROT SERPL-MCNC: 7.4 G/DL (ref 6–8.2)
RBC # BLD AUTO: 4.93 M/UL (ref 4.7–6.1)
SODIUM SERPL-SCNC: 139 MMOL/L (ref 135–145)
TRIGL SERPL-MCNC: 452 MG/DL (ref 0–149)
TSH SERPL DL<=0.005 MIU/L-ACNC: 3.27 UIU/ML (ref 0.38–5.33)
URATE SERPL-MCNC: 5.2 MG/DL (ref 2.5–8.3)
WBC # BLD AUTO: 7.2 K/UL (ref 4.8–10.8)

## 2021-07-29 PROCEDURE — 82570 ASSAY OF URINE CREATININE: CPT

## 2021-07-29 PROCEDURE — 80053 COMPREHEN METABOLIC PANEL: CPT

## 2021-07-29 PROCEDURE — 36415 COLL VENOUS BLD VENIPUNCTURE: CPT

## 2021-07-29 PROCEDURE — 84550 ASSAY OF BLOOD/URIC ACID: CPT

## 2021-07-29 PROCEDURE — 85025 COMPLETE CBC W/AUTO DIFF WBC: CPT

## 2021-07-29 PROCEDURE — 80061 LIPID PANEL: CPT

## 2021-07-29 PROCEDURE — 84443 ASSAY THYROID STIM HORMONE: CPT

## 2021-07-29 PROCEDURE — 82043 UR ALBUMIN QUANTITATIVE: CPT

## 2021-08-09 ENCOUNTER — OFFICE VISIT (OUTPATIENT)
Dept: MEDICAL GROUP | Facility: MEDICAL CENTER | Age: 66
End: 2021-08-09
Payer: MEDICARE

## 2021-08-09 VITALS
SYSTOLIC BLOOD PRESSURE: 130 MMHG | HEART RATE: 62 BPM | OXYGEN SATURATION: 97 % | DIASTOLIC BLOOD PRESSURE: 82 MMHG | HEIGHT: 73 IN | WEIGHT: 258 LBS | BODY MASS INDEX: 34.19 KG/M2 | TEMPERATURE: 97.5 F

## 2021-08-09 DIAGNOSIS — R51.9 NONINTRACTABLE HEADACHE, UNSPECIFIED CHRONICITY PATTERN, UNSPECIFIED HEADACHE TYPE: ICD-10-CM

## 2021-08-09 DIAGNOSIS — Z23 NEED FOR PNEUMOCOCCAL VACCINATION: ICD-10-CM

## 2021-08-09 DIAGNOSIS — Z23 NEED FOR SHINGLES VACCINE: ICD-10-CM

## 2021-08-09 DIAGNOSIS — E66.9 OBESITY (BMI 30-39.9): ICD-10-CM

## 2021-08-09 DIAGNOSIS — M54.50 LOW BACK PAIN, UNSPECIFIED BACK PAIN LATERALITY, UNSPECIFIED CHRONICITY, UNSPECIFIED WHETHER SCIATICA PRESENT: ICD-10-CM

## 2021-08-09 DIAGNOSIS — Z00.00 PREVENTATIVE HEALTH CARE: ICD-10-CM

## 2021-08-09 DIAGNOSIS — I10 HYPERTENSION, UNSPECIFIED TYPE: ICD-10-CM

## 2021-08-09 DIAGNOSIS — E78.5 DYSLIPIDEMIA: Chronic | ICD-10-CM

## 2021-08-09 DIAGNOSIS — Z12.5 PROSTATE CANCER SCREENING: ICD-10-CM

## 2021-08-09 PROBLEM — N18.30 CKD (CHRONIC KIDNEY DISEASE) STAGE 3, GFR 30-59 ML/MIN: Status: RESOLVED | Noted: 2020-10-26 | Resolved: 2021-08-09

## 2021-08-09 PROCEDURE — G0009 ADMIN PNEUMOCOCCAL VACCINE: HCPCS | Performed by: INTERNAL MEDICINE

## 2021-08-09 PROCEDURE — 99214 OFFICE O/P EST MOD 30 MIN: CPT | Mod: 25 | Performed by: INTERNAL MEDICINE

## 2021-08-09 PROCEDURE — 90750 HZV VACC RECOMBINANT IM: CPT | Performed by: INTERNAL MEDICINE

## 2021-08-09 PROCEDURE — 90472 IMMUNIZATION ADMIN EACH ADD: CPT | Performed by: INTERNAL MEDICINE

## 2021-08-09 PROCEDURE — 90732 PPSV23 VACC 2 YRS+ SUBQ/IM: CPT | Performed by: INTERNAL MEDICINE

## 2021-08-09 ASSESSMENT — FIBROSIS 4 INDEX: FIB4 SCORE: 1.76

## 2021-08-09 NOTE — PROGRESS NOTES
Subjective:      Luan Schmidt is a 66 y.o. male who presents with cholesterol        HPI     Here follow up dyslipidemia, on atorvastatin, his insurance does cover Vascepa and he will be able to start that medication, the prescription is at the pharmacy, blood pressure stable on lisinopril, and metoprolol, has been exercising 3 miles per day and also doing renovations on his home doing his deck work, has noticed low back pain more noticeable the past two weeks especially, no radiation down the legs, not worse with bending or twisting, notices that at night, no falls or back trauma, it is a low back type pain, no associated dysuria, hematuria, urgency or frequency, no history of kidney stones or recurrent urinary tract infections  Diet has tried to cut back on cheese, tries to limit sweets and candies, has eliminated takeout food now that he is retired, he and his wife are both eating healthier  No soda and limits alcohol  Medications, allergies, medical history, surgical history, social history, family history  reviewed and updated  Gout has been stable on the allopurinol with no flareup, no use of indomethacin  Most recent labs 7/29/21 chol 227,trig 452,hdl 39 on lipitor 20 mg   Headache is also improved since he has been using nasal lavage      Current Outpatient Medications   Medication Sig Dispense Refill   • Icosapent Ethyl (VASCEPA) 1 g Cap Take 2 g by mouth 2 (two) times a day. 360 capsule 3   • indomethacin (INDOCIN) 50 MG Cap Take 1 capsule by mouth 3 times a day as needed (gout flare up. should not be taking this daily. this should last more than 3 months.). 270 capsule 1   • allopurinol (ZYLOPRIM) 300 MG Tab Take 1 tablet by mouth every day. 90 tablet 3   • atorvastatin (LIPITOR) 20 MG Tab Take 1 tablet by mouth every day. 90 tablet 3   • levothyroxine (SYNTHROID) 175 MCG Tab Take 1 tablet by mouth every morning on an empty stomach. 90 tablet 3   • sertraline (ZOLOFT) 50 MG Tab Take 1.5 Tablets  by mouth every day. 135 tablet 3   • metoprolol SR (TOPROL XL) 100 MG TABLET SR 24 HR Take 1 tablet by mouth every day. 90 tablet 3   • lisinopril (PRINIVIL) 10 MG Tab Take 1 tablet by mouth every day. 90 tablet 3   • fluticasone (FLONASE) 50 MCG/ACT nasal spray Administer 1 Spray into affected nostril(S) 2 times a day. 16 g 11   • aspirin (ASA) 81 MG CHEW Take 1 Tab by mouth every day. 100 Each 11     No current facility-administered medications for this visit.         Allergic rhinitis  12/15/20 trial flonase, claritin made headache   1/25/21 has been using sudafed over-the-counter with success but we would like to avoid long-term use, will change to astelin instead  4/1/21 trial of otc zyrtec and flonase     Anxiety  8/19/16 RONALD score 10, PHQ 4, start zoloft 50 mg, declines psychotherapy  10/16/17 on zoloft 75 mg   12/3/19 on zoloft 75 mg add pamelor 10 mg for headache  8/31/20 on zoloft 75 mg off pamelor      Dyslipidemia  3/09 chol  299, trig 367,hdl 47, ldl 179 started on simcor no followup testing   6/10 chol 278,trig 366,hdl 51,ldl 154 add fish oil 2 pills bid, will not increase simcor due to hx gout  4/11 off simcor  4/11 chol 215,trig 232,hdl 39,ldl 130 off meds  12/31/11 chol 245,trig 240,hdl 48,ldl 149 off meds  10/12 chol 268,trig 239,hdl 54,ldl 166 start fish oil two pills daily  11/29/13 chol 265,trig 229,hdl 46,ldl 168  12/19/13 start lipitor 20 mg  3/27/14 chol 190,trig 164,hdl 51,ldl 106 on lipitor 20 mg  8/29/14 chol 208,trig 280,hdl 51,ldl 101 on lipitor 20 mg  10/15/15 chol 187,trig 223,hdl 46,ldl 96 on lipitor 20 mg  9/16/16 chol 182,trig 262,hdl 46,ldl 84 on lipitor 20 mg  10/4/17 chol 221,trig 324,hdl 51,ldl 105 on lipitor 20 mg  7/2/19 chol 216,trig 312,hdl 41,ldl 113 on lipitor 20 mg  10/20/20 chol 266,trig 475,hdl 43 on lipitor 20 mg  10/28/20 declines fibrate  12/11/20 chol 217,trig 326,hdl 38,ldl 114 on lipitor 20 mg  4/1/21 patient will check with insurance to see which is  covered  7/29/21 chol 227,trig 452,hdl 39 on lipitor 20 mg      gout  On no medication for over 2 years  6/10 uric 9.6 monitor for gout flare up since on simcor  4/18/11 left elbow pain question gout flareup  4/11 uric 7.1  12/11 uric 7.0  1/17/12 gout flare up  10/12 uric 7.8  11/29/13 uric 7.9  8/29/14 uric 7.9  10/15/15 uric 8.8  9/16/16 uric 7.7  10/4/17 uric 8.7  7/2/19 uric 9.4  7/8/19 declines allopurinol  10/20/20 uric 9.7  10/28/20 start allopurinol 300 mg repeat uric and labs 4 weeks  12/11/20 uric 6.6 on allopurinol 300 mg  7/29/21 uric 5.2 on allopurinol 300 mg     headache  10/2/19 MRI brain negative  12/3/19 headache, question neuralgia, trial of pamelor 10 mg  12/23/19 increase pamelor to 20 mg  1/30/20 on pamelor 10 mg effective  8/31/20 orthostatic headache, referral neurology, trial of gabapentin, MRI already done, consider MRI with gadolinium  10/20/20 ESR 3  11/3/20  neurology note primary cough headache, obtain MRI head with and without, CTA head and follow up 2 months    2/26/21 MRI brain with and without per neurology, 5 x 11 mm small enhancing extra-axial lesion base medial right frontal lobe likely incidental meningioma     Hearing loss  9/12  audiology note mild high-frequency sensorineural loss right ear, brainstem response normal     History decreased GFR  6/21/10 bun 19,creat 0.98  10/19/12 bun 16,creat 0.90  11/27/13 bun 13,creat 0.98  8/28/14 bun 22,creat 1.0  9/16/16 bun 15,creat 1.04  10/3/17 bun 21,creat 1.04  7/2/19 bun 28,creat 1.38,GFR 52  10/19/20 bun 32,creat 1.43,GFR 50, PTH 34, SPEP negative,calcium 9.8,vit d 38, hep c ab negative  12/11/20 bun 14,creat 1.0,GFR>60,PTH 36,calcium 9.6,phos 3.5  7/29/21 bun 21,creat 1.02,GFR>60     history esophagitis  10/9/15 dyspepsia, chest x-ray, ultrasound, stress echo pending  10/29/15 ultrasound enlarged liver increased echotexture fatty infiltration or hepatocellular disease   11/31/15 TSH ordered for next month and  labs for peripheral neuropathy feet, declines MRI lumbar spine repeat EMG NCS for now, cont limit etoh  11/19/15 stress echo mild concentric LVH on baseline echo, excellent exercise tolerance, negative stress echo for ischemia, brenna protocol 11 minutes 48 seconds  11/19/15 cxr negative  2/28/16 GI consultation note, solid food dysphagia, will arrange for EGD  3/8/18 EGD per [facility] grade B esophagitis and gastritis, biopsy performed, take prilosec 20 mg daily   3/23/18 GI note dysphagia, recent EGD grade B esophagitis, biopsies negative, swelling has improved, did not start omeprazole, unsure if this is hypertonic UES versus cricopharyngeal achalasia or hypertrophy given lack of eosinophilic esophagitis or schatzki's ring, recommend trial omeprazole for 2 months then wean off if possible     Hypertension  6/14/10 add lisinopril 10 mg to toprol  mg  3/15/13 treadmill thallium, treadmill component 10 minutes 48 seconds 1 mm upsloping ST segment depression nondiagnostic, hypertensive response to exercise; ejection fraction 61%, no ischemia noted  11/29/13 urine mac < 2.4 on lisinopril 10 mg and toprol  mg  10/15/15 urine mac <0.5 on lisinopril 10 mg and toprol  mg  11/19/15 stress echo mild concentric LVH on baseline echo, excellent exercise tolerance, negative stress echo for ischemia, brenna protocol 11 minutes 48 seconds  9/16/16 urine mac<0.7 on lisinopril 10 mg and toprol  mg  10/4/17 urine mac 8.7 on lisinopril 10 mg and toprol  mg  2/8/18 treadmill stress test, brenna protocol 10 minutes, 12.8 METS, target heart rate achieved, no chest pain, no t-wave changes, normal stress EKG  12/3/19 increase lisinopril to 20 mg daily and continue toprol  mg  10/21/20 urine mac<1.2 on lisinopril 20 mg and toprol  mg  7/29/21 urine mac<1.2 on lisinopril 20 mg and toprol  mg     Hypothyroid  3/09 tsh 7.5 at that time had been on 0.2 mg half a tablet plus 0.125 for a total of 0.225  daily, this was changed to 0.2 mg one tablet po every Tuesday Thursday Saturday Sunday and 0.2 mg 1-1/2 tablets on Monday Wednesday Friday  6/10 tsh 0.148  6/23/10 change to levothyroxine 0.2 daily repeat in 6 weeks  8/27/10 tsh 2.7   4/11 tsh 0.6 on levothyroxine 200 mcg  10/12 tsh 27 on levothyroxine 200 mcg but not taking regularly  11/29/13 tsh 8.6 on levothyroxine 200 mcg daily  12/19/13 take levothyroxine 200 mg before eating  3/27/14 tsh 0.8 on levothyroxine 200 mcg  8/29/14 tsh 8.3 on levothyroxine 200 mcg taking thyroid med with supplements in am he will try to change to taking medication separate from everything, repeat labs 6 weeks  10/15/15 tsh 0.12 on levothyroxine 200 mcg daily change to levothyroxine 175 mcg  3/23/16 tsh 1.1 on levothyroxine 175 mcg  9/16/16 tsh 0.1 on levothyroxine 175 mcg change to one po six days per week, repeat tsh 6 weeks  1/30/17 tsh 1.3 on levothyroxine 175 mcg six days per week  10/4/17 tsh 2.1 on levothyroxine 175 mcg six days per week  7/2/19 tsh 8.6 on levothyroxine 175 mcg six days per week  7/8/19 change to levothyroxine 175 mcg daily repeat tsh 6 weeks  10/20/20 tsh 7.0 on levothyroxine 175 mcg taking six days per week, will change to daily  12/11/20 tsh 0.72 on levothyroxine 175 mcg daily    7/29/21 tsh 3.2 on levothyroxine 175 mcg daily      Insomnia  8/26/14 trial ambien 10 mg  9/7/17 continues on ambien, declines sleep psychology evaluation, work on taper    meningioma  10/2/19 MRI brain negative  11/3/20  neurology note primary cough headache, obtain MRI head with and without, CTA head and follow up 2 months    2/26/21 MRI brain with and without per neurology, 5 x 11 mm small enhancing extra-axial lesion base medial right frontal lobe likely incidental meningioma     Peripheral neuropathy  10/27/12 right foot neuropathy labs tsh 27, MAXIMUS,SPEP,folate,B12,B1,ESR negative, if negative consider EMG NCS  10/29/12 pt states not taking levothyroxine 200 mcg reg,  "will start daily and repeat labs tsh and lipid in 6 weeks  12/24/12 no improvement in symptoms when taking levothyroxine regularly, has decreased etoh  2/13 EMG NCS normal motor and sensory nerve conduction studies bilateral lower extremities     Preventive health  12/30/11 tdap  2/29/12 colon per [facility] repeat in 10 yrs  8/26/14 zoster vaccine  6/27/19 declines prostate and rectal exam  8/22/19 cologuard negative  10/20/20 vit d 38  10/20/20 hep c ab negative  10/20/20 psa 0.44  12/15/20 shingrix first  4/22/21 covid pfizer second     Renal cyst  10/29/15 ultrasound enlarged liver increased echotexture fatty infiltration or hepatocellular disease, 3.2 cm probable complicated cystic exophytic lesion right kidney  11/30/15 MRI abdomen with and without; posterior lateral exophytic right kidney lesion 2.6 x 1.8 x 2.9 cm, no internal enhancement, consistent with benign cyst, also incidental exophytic 13 mm right renal cyst, no followup is necessary       Patient Active Problem List   Diagnosis   • Hypothyroid   • Dyslipidemia   • Rosacea   • Hypertension   • Gout   • S/P appendectomy   • Preventative health care   • Erectile dysfunction   • Hearing loss   • Peripheral neuropathy   • Obesity (BMI 30-39.9)   • Insomnia   • Renal cyst   • History of esophagitis   • Anxiety   • Headache   • History of decreased GFR   • Allergic rhinitis   • Meningioma (HCC)          Health Maintenance Summary                IMM INFLUENZA Next Due 9/1/2021      Done 12/15/2020 Imm Admin: Influenza Vaccine Quad Inj (Pf)     Patient has more history with this topic...    IMM DTaP/Tdap/Td Vaccine Next Due 12/30/2021      Done 12/30/2011 Imm Admin: Tdap Vaccine    COLONOSCOPY Next Due 2/28/2022      Done 2/29/2012           Patient Care Team:  Luis E Rutherford M.D. as PCP - General      ROS       Objective:     /82   Pulse 62   Temp 36.4 °C (97.5 °F) (Temporal)   Ht 1.854 m (6' 1\")   Wt 117 kg (258 lb)   SpO2 97%   BMI 34.04 kg/m²  "     Physical Exam  Vitals and nursing note reviewed.   Constitutional:       Appearance: Normal appearance.   HENT:      Head: Normocephalic and atraumatic.      Right Ear: External ear normal.      Left Ear: External ear normal.   Eyes:      Conjunctiva/sclera: Conjunctivae normal.   Cardiovascular:      Rate and Rhythm: Normal rate and regular rhythm.      Heart sounds: Normal heart sounds.   Pulmonary:      Effort: Pulmonary effort is normal. No respiratory distress.      Breath sounds: Normal breath sounds.   Abdominal:      General: There is no distension.   Musculoskeletal:         General: No swelling.      Cervical back: Neck supple.   Skin:     General: Skin is warm.   Neurological:      General: No focal deficit present.      Mental Status: He is alert.   Psychiatric:         Mood and Affect: Mood normal.         Behavior: Behavior normal.       No spinal tenderness, normal dorsiflexion, plantarflexion, knee flexion extension bilateral, no lower extremity edema                 Assessment/Plan:        Assessment  #1 hypertension stable on lisinopril and Toprol    #2 dyslipidemia 7/29/21 chol 227,trig 452,hdl 39 on lipitor 20 mg still has elevated triglycerides, his insurance does cover Vascepa    #3 low back pain localized, no radiculopathy, no sensory changes, no focal deficits on exam    #4 headaches improved with nasal lavage possible allergy component    #5 BMI 34.0 work on nutrition, exercise, weight loss    #6 gout stable on allopurinol    #7 hypothyroid TSH 3.2 on levothyroxine 175 mcg    #8 incidental meningioma 5 x 11 mm on MRI in February 2021    Plan  #1 shingrix second in series today    #2 pneumovax today    #3 next year he can have the prevnar vaccine    #4  Repeat labs in September to include PSA but this has to be done after October 21    #5 start Vascepa, continue Lipitor    #6 continue work on good nutrition, diet, exercise, weight loss will be beneficial for cholesterol, blood pressure,  and his back    #7 Ensure adequate hydration    #8 continue check blood pressure regularly    #9 lumbar spine x-ray, consider physical therapy, urinalysis to evaluate for hematuria or UTI although less likely as a cause of his back pain    #10 follow-up 6 months    #11 repeat MRI brain in February follow-up small meningioma

## 2021-08-10 ENCOUNTER — TELEPHONE (OUTPATIENT)
Dept: MEDICAL GROUP | Facility: MEDICAL CENTER | Age: 66
End: 2021-08-10

## 2021-08-10 DIAGNOSIS — R45.86 MOOD DISTURBANCE: ICD-10-CM

## 2021-08-10 DIAGNOSIS — I10 ESSENTIAL HYPERTENSION: ICD-10-CM

## 2021-08-10 DIAGNOSIS — M10.9 GOUT, UNSPECIFIED CAUSE, UNSPECIFIED CHRONICITY, UNSPECIFIED SITE: ICD-10-CM

## 2021-08-10 DIAGNOSIS — E78.5 DYSLIPIDEMIA: ICD-10-CM

## 2021-08-10 DIAGNOSIS — E03.9 HYPOTHYROIDISM, UNSPECIFIED TYPE: ICD-10-CM

## 2021-08-10 RX ORDER — LEVOTHYROXINE SODIUM 175 UG/1
175 TABLET ORAL
Qty: 90 TABLET | Refills: 3 | Status: SHIPPED
Start: 2021-08-10 | End: 2022-11-04

## 2021-08-10 RX ORDER — ICOSAPENT ETHYL 1000 MG/1
2 CAPSULE ORAL
Qty: 360 CAPSULE | Refills: 3 | Status: SHIPPED
Start: 2021-08-10 | End: 2022-11-01 | Stop reason: SDUPTHER

## 2021-08-10 RX ORDER — LISINOPRIL 10 MG/1
10 TABLET ORAL
Qty: 90 TABLET | Refills: 3 | Status: SHIPPED
Start: 2021-08-10 | End: 2022-11-04

## 2021-08-10 RX ORDER — LISINOPRIL 10 MG/1
10 TABLET ORAL
Qty: 90 TABLET | Refills: 3 | Status: SHIPPED | OUTPATIENT
Start: 2021-08-10 | End: 2021-08-10 | Stop reason: SDUPTHER

## 2021-08-10 RX ORDER — METOPROLOL SUCCINATE 100 MG/1
100 TABLET, EXTENDED RELEASE ORAL DAILY
Qty: 90 TABLET | Refills: 3 | Status: SHIPPED | OUTPATIENT
Start: 2021-08-10 | End: 2021-08-10 | Stop reason: SDUPTHER

## 2021-08-10 RX ORDER — LEVOTHYROXINE SODIUM 175 UG/1
175 TABLET ORAL
Qty: 90 TABLET | Refills: 3 | Status: SHIPPED | OUTPATIENT
Start: 2021-08-10 | End: 2021-08-10 | Stop reason: SDUPTHER

## 2021-08-10 RX ORDER — ICOSAPENT ETHYL 1000 MG/1
2 CAPSULE ORAL
Qty: 360 CAPSULE | Refills: 3 | Status: SHIPPED | OUTPATIENT
Start: 2021-08-10 | End: 2021-08-10 | Stop reason: SDUPTHER

## 2021-08-10 RX ORDER — ATORVASTATIN CALCIUM 20 MG/1
20 TABLET, FILM COATED ORAL DAILY
Qty: 90 TABLET | Refills: 3 | Status: SHIPPED | OUTPATIENT
Start: 2021-08-10 | End: 2021-08-10 | Stop reason: SDUPTHER

## 2021-08-10 RX ORDER — METOPROLOL SUCCINATE 100 MG/1
100 TABLET, EXTENDED RELEASE ORAL DAILY
Qty: 90 TABLET | Refills: 3 | Status: SHIPPED
Start: 2021-08-10 | End: 2022-11-04

## 2021-08-10 RX ORDER — ALLOPURINOL 300 MG/1
300 TABLET ORAL DAILY
Qty: 90 TABLET | Refills: 3 | Status: SHIPPED | OUTPATIENT
Start: 2021-08-10 | End: 2021-08-10 | Stop reason: SDUPTHER

## 2021-08-10 RX ORDER — ALLOPURINOL 300 MG/1
300 TABLET ORAL DAILY
Qty: 90 TABLET | Refills: 3 | Status: SHIPPED
Start: 2021-08-10 | End: 2022-11-04

## 2021-08-10 RX ORDER — ATORVASTATIN CALCIUM 20 MG/1
20 TABLET, FILM COATED ORAL DAILY
Qty: 90 TABLET | Refills: 3 | Status: SHIPPED
Start: 2021-08-10 | End: 2022-11-04

## 2021-08-10 RX ORDER — INDOMETHACIN 50 MG/1
50 CAPSULE ORAL 3 TIMES DAILY PRN
Qty: 270 CAPSULE | Refills: 1 | Status: SHIPPED | OUTPATIENT
Start: 2021-08-10 | End: 2021-08-10 | Stop reason: SDUPTHER

## 2021-08-10 RX ORDER — INDOMETHACIN 50 MG/1
50 CAPSULE ORAL 3 TIMES DAILY PRN
Qty: 270 CAPSULE | Refills: 1 | Status: SHIPPED
Start: 2021-08-10 | End: 2021-11-15

## 2021-08-11 NOTE — TELEPHONE ENCOUNTER
----- Message from Hailey Eagle sent at 8/10/2021  2:33 PM PDT -----  Regarding: FW: Prescription Question  Contact: [phone]    ----- Message -----  From: Laun Schmidt  Sent: 8/10/2021   2:21 PM PDT  To: Shari Olsen  Subject: RE: Prescription Question                        Dr. Rutherford,    I have been struggling with my new provider to accept my prescriptions.  I've attempted via email and on line and per the attached, they have requested that they be faxed by your office directly to them.  The attachment has the fax number and all relevant information.  I've also attached the scan of the seven medications that we need to get into the system and on 90 day auto refill.  Can you please send these on?   Thank you.

## 2021-08-16 ENCOUNTER — TELEPHONE (OUTPATIENT)
Dept: MEDICAL GROUP | Facility: MEDICAL CENTER | Age: 66
End: 2021-08-16

## 2021-08-16 NOTE — TELEPHONE ENCOUNTER
Spoke with Sol @ pt's mail in pharmacy. Indomethacin is not covered under his formulary. If pt must have this medication call [phone] for prior auth. If you would like to change medication, you may Rx Ibuprfin 800, Naproxin 550, ot Etoralac 300mg. Please advise.

## 2021-08-16 NOTE — TELEPHONE ENCOUNTER
Can we start the process for the prior authorization please?    (1) Indomethacin 50 mg one po tid prn gout #90 per 30 days.  (2) diagnosis: gout  (3) ICD 10: M10.9  (4) comments: has tried and failed ibuprofen and naprosyn

## 2021-11-15 DIAGNOSIS — G47.00 INSOMNIA, UNSPECIFIED TYPE: Chronic | ICD-10-CM

## 2021-11-15 RX ORDER — ZOLPIDEM TARTRATE 10 MG/1
10 TABLET ORAL NIGHTLY PRN
Qty: 90 EACH | Refills: 1
Start: 2021-11-15 | End: 2022-02-13

## 2022-04-20 NOTE — TELEPHONE ENCOUNTER
HOSPITAL DISCHARGE SUMMARY  Admission Date/Time:  4/1/2022  3:52 PM Attending Physician: David Jauregui MD   Expected Discharge Date: 4/15/2022 Primary Care Physician: Alvarez Martin MD     Discharge Diagnoses: Leukocytosis     Surgical Procedure(s):  Procedure(s) (LRB):  LOWER EXTERMITY ANGIOGRAM ANGIOGRAM WITH ANGIOPLASTY OF ANTERIOER TIBIAL ARTERY (Right)  . (N/A)  Infected TMA right foot/gas gangrene  Incision and drainage with irrigation and debridement of all nonviable tissue and bone right foot/TMA  Leukocytosis  Diarrhea with C. difficile pan colitis  End-stage renal disease on hemodialysis  Pulmonary hypertension  Scleroderma  Raynaud's disease complicated by bilateral lower extremity gangrene status post bilateral TMA 3/5/2022  COPD  Obstructive nephropathy status post bilateral nephrostomy tubes  Anemia of chronic disease  Urine cultures Proteus vulgaris/Klebsiella pneumonia  Left and right foot TMA dehiscence present on admission  Angiogram and angioplasty anterior tibial artery  Severe PVD  Status post bilateral TMA  Post emergent I&D of right foot       Summary of History and Hospital Course: Patient came in with chief complaints of severe peripheral vascular disease was seen by vascular services had surgery done also had an infection for which she was seen by Id  Antibiotic significant continued  Patient's long-term prognosis continues to be poor  Patient was discharged back to subacute rehab with instructions to continue his antibiotics with dialysis  Discharged Condition:  Stable    Disposition: Skilled Nursing Facility [facility] where    Discharge Medications:      Summary of your Discharge Medications      Take these Medications      Details   aspirin 81 MG chewable tablet   Chew 1 tablet by mouth daily.     atorvastatin 20 MG tablet  Commonly known as: LIPITOR   Take 1 tablet by mouth nightly.     calcium acetate gelcap 667 MG capsule  Commonly known as: PHOSLO   Take 667 mg by mouth  Notified with labs, TSH increased, taking synthroid 175 mcg 6 days a week, will change to 7 days a week, CKD on labs, no regular NSAIDs when gout not acting up, offered allopurinol, will hold off for now as he will work on improving his diet, hopefully to decrease gout attacks, if gout attacks persist consider allopurinol, for now try to avoid NSAIDs, will repeat kidney function test in 6 weeks with TSH repeat at that time.   3 times daily (with meals).     calcium carbonate 500 MG chewable tablet  Commonly known as: TUMS   Chew 1 tablet by mouth at bedtime.     cefepime 2 g injection  Commonly known as: MAXIPIME   Inject 500 mg into the vein daily. Do not start before April 9, 2022.  Comment: Cefepime 500mg q24 AFTER dialysis on HD days  Continue through 5/13/22  CBC with diff, CMP qweekly  Pharm and house MD at [facility] to follow labs and dose adjust if needed     cyanocobalamin 1000 MCG tablet   Take 1,000 mcg by mouth daily.     ferrous sulfate 325 (65 FE) MG tablet   Take 1 tablet by mouth daily (with breakfast). Do not start before March 12, 2022.     folic acid 1 MG tablet  Commonly known as: FOLATE   Take 1 tablet by mouth daily. Do not start before March 12, 2022.     gabapentin 100 MG capsule  Commonly known as: NEURONTIN   Take 1 capsule by mouth 3 times daily.     HYDROcodone-acetaminophen  MG per tablet  Commonly known as: NORCO   Take 1 tablet by mouth every 4 hours as needed for Pain.     insulin lispro 100 UNIT/ML injectable solution   Per nursing home if BS<61, give no insulin and call doctor.  give no insulin, 151-200=1 unit, 201-250=2 units, 251-300=3 units, 301-350=4 units, 351-400=5 units, 401+=5 units. If BS>400, give insulin and call doctor, before meals and at bedtime     predniSONE 5 MG tablet  Commonly known as: DELTASONE   Take 1 tablet by mouth daily (with breakfast) for 20 days.     sevelamer carbonate 800 MG tablet  Commonly known as: RENVELA   Take 2 tablets by mouth 3 times daily (with meals).     sildenafil 20 MG tablet  Commonly known as: REVATIO   Take 1 tablet by mouth 3 times daily.     Tylenol 8 Hour 650 MG CR tablet   Generic drug: acetaminophen  Take 650 mg by mouth every 6 hours as needed for Pain.     vancomycin 125 MG capsule  Commonly known as: VANCOCIN   Take 1 capsule by mouth every 12 hours.  Comment: Continue through 5/20/22  +cdiff on longterm iv abx     vitamin C 500 MG tablet    Take 500 mg by mouth daily.          30 minutes minutes spent on interview, examination, final orders, recommendations, and care coordination for this hospital discharge.      Signed:  David Jauregui MD,  4/20/2022, 9:55 AM

## 2022-06-27 ENCOUNTER — TELEPHONE (OUTPATIENT)
Dept: MEDICAL GROUP | Facility: MEDICAL CENTER | Age: 67
End: 2022-06-27
Payer: COMMERCIAL

## 2022-06-27 PROBLEM — Z86.16 HISTORY OF COVID-19: Status: ACTIVE | Noted: 2022-06-27

## 2022-06-27 NOTE — TELEPHONE ENCOUNTER
Luan Schmidt  [phone] (home)     Pt called to say that he has had COVID syptoms x 4 days and this am tested POS. Wants Rx for Paxlovid and care instructions Tried to call and see what his symptoms were but had to LM. Please advise.

## 2022-06-28 NOTE — TELEPHONE ENCOUNTER
Please notify the patient I will send a prescription for the Paxlovid to his pharmacy, the medication may cause nausea, joint pain, muscle aches, elevated blood pressure, and a metallic taste in his mouth.    He needs to stop the atorvastatin cholesterol medication while on the paxlovid and continue off the atorvastatin cholesterol medication for 7 days after completion.    Should he develop any shortness of breath that worsens he needs to go to the emergency room.

## 2022-11-01 ENCOUNTER — TELEPHONE (OUTPATIENT)
Dept: MEDICAL GROUP | Facility: MEDICAL CENTER | Age: 67
End: 2022-11-01

## 2022-11-01 ENCOUNTER — OFFICE VISIT (OUTPATIENT)
Dept: MEDICAL GROUP | Facility: MEDICAL CENTER | Age: 67
End: 2022-11-01
Payer: COMMERCIAL

## 2022-11-01 VITALS
HEIGHT: 72 IN | TEMPERATURE: 97.4 F | SYSTOLIC BLOOD PRESSURE: 148 MMHG | HEART RATE: 69 BPM | WEIGHT: 262 LBS | DIASTOLIC BLOOD PRESSURE: 86 MMHG | BODY MASS INDEX: 35.49 KG/M2 | OXYGEN SATURATION: 95 %

## 2022-11-01 DIAGNOSIS — M10.9 GOUT OF FOOT, UNSPECIFIED CAUSE, UNSPECIFIED CHRONICITY, UNSPECIFIED LATERALITY: ICD-10-CM

## 2022-11-01 DIAGNOSIS — E55.9 VITAMIN D DEFICIENCY: ICD-10-CM

## 2022-11-01 DIAGNOSIS — Z00.00 MEDICARE ANNUAL WELLNESS VISIT, SUBSEQUENT: ICD-10-CM

## 2022-11-01 DIAGNOSIS — Z12.5 PROSTATE CANCER SCREENING: ICD-10-CM

## 2022-11-01 DIAGNOSIS — Z23 NEEDS FLU SHOT: ICD-10-CM

## 2022-11-01 DIAGNOSIS — M54.9 BACK PAIN, UNSPECIFIED BACK LOCATION, UNSPECIFIED BACK PAIN LATERALITY, UNSPECIFIED CHRONICITY: ICD-10-CM

## 2022-11-01 DIAGNOSIS — I15.9 SECONDARY HYPERTENSION: ICD-10-CM

## 2022-11-01 DIAGNOSIS — Z00.00 PREVENTATIVE HEALTH CARE: ICD-10-CM

## 2022-11-01 DIAGNOSIS — E78.5 DYSLIPIDEMIA: ICD-10-CM

## 2022-11-01 DIAGNOSIS — D32.9 MENINGIOMA (HCC): ICD-10-CM

## 2022-11-01 DIAGNOSIS — R07.89 ATYPICAL CHEST PAIN: ICD-10-CM

## 2022-11-01 DIAGNOSIS — E66.9 OBESITY (BMI 30-39.9): ICD-10-CM

## 2022-11-01 DIAGNOSIS — Z12.11 COLON CANCER SCREENING: ICD-10-CM

## 2022-11-01 DIAGNOSIS — R73.09 IMPAIRED GLUCOSE METABOLISM: ICD-10-CM

## 2022-11-01 DIAGNOSIS — F41.9 ANXIETY: ICD-10-CM

## 2022-11-01 DIAGNOSIS — I10 HYPERTENSION, UNSPECIFIED TYPE: ICD-10-CM

## 2022-11-01 DIAGNOSIS — Z23 NEED FOR TDAP VACCINATION: ICD-10-CM

## 2022-11-01 PROCEDURE — 90471 IMMUNIZATION ADMIN: CPT | Performed by: INTERNAL MEDICINE

## 2022-11-01 PROCEDURE — 99397 PER PM REEVAL EST PAT 65+ YR: CPT | Mod: 25 | Performed by: INTERNAL MEDICINE

## 2022-11-01 PROCEDURE — 90662 IIV NO PRSV INCREASED AG IM: CPT | Performed by: INTERNAL MEDICINE

## 2022-11-01 PROCEDURE — 90472 IMMUNIZATION ADMIN EACH ADD: CPT | Performed by: INTERNAL MEDICINE

## 2022-11-01 PROCEDURE — 90715 TDAP VACCINE 7 YRS/> IM: CPT | Performed by: INTERNAL MEDICINE

## 2022-11-01 RX ORDER — ICOSAPENT ETHYL 1000 MG/1
2 CAPSULE ORAL
Qty: 360 CAPSULE | Refills: 3 | Status: SHIPPED | OUTPATIENT
Start: 2022-11-01 | End: 2023-12-01

## 2022-11-01 ASSESSMENT — FIBROSIS 4 INDEX: FIB4 SCORE: 1.79

## 2022-11-01 ASSESSMENT — ACTIVITIES OF DAILY LIVING (ADL): BATHING_REQUIRES_ASSISTANCE: 0

## 2022-11-01 ASSESSMENT — PATIENT HEALTH QUESTIONNAIRE - PHQ9: CLINICAL INTERPRETATION OF PHQ2 SCORE: 0

## 2022-11-01 ASSESSMENT — ENCOUNTER SYMPTOMS: GENERAL WELL-BEING: GOOD

## 2022-11-01 NOTE — TELEPHONE ENCOUNTER
Please call the patient, have him schedule a follow-up appointment sometime in March for a blood pressure recheck, that can be a virtual appointment

## 2022-11-01 NOTE — PROGRESS NOTES
Subjective     Luan Schmidt is a 67 y.o. male who presents with           [facility]        Medicare wellness  Current supplements: Reviewed  Chronic narcotic pain medicines: No  Allergies:  reviewed    Screening: Reviewed  Depressive Symptoms: Denies feeling down, depressed or hopeless. Denies loss of interest or pleasure in doing things   ADLs: Denies needing help with using telephone, transportation, shopping, preparing meals, housework, laundry, or managing medication or money.    Independent with bathing, hygiene, feeding, toileting, dressing    Memory concerns: Denies difficulty remembering details of conversations, events and upcoming appointments.  Hearing problems: Denies.   Recent falls no    Current social contact/activities: Reviewed    ROS:    Ostomy or other tubes or amputations no  Chronic oxygen use no     Gait: normal. Uses assistive device :  no       Health Care Screening recommendations reviewed with patient today and updated or ordered.  DPA/Advanced directive: Completed/Information provided.   Referrals for PT/OT/Nutrition counseling/Behavioral Health/Smoking cessation as above if indicated  Discussion today about general wellness and lifestyle habits:    Prevent falls and reduce trip hazards;   Have a working fire alarm and carbon monoxide detector;   Engage in regular physical activity and social activities;   Use sun protection when outdoors.     Treadmill 2.7 to 3.0 mph for an hour three times per week and weight training as well.  No balance issues, no falls  No mood changes, no anxiety, no depression, no memory loss  Not checking his blood pressure at home, remains on lisinopril and Toprol, occasional back pain possibly unrelated to treadmill, no anterior chest discomfort with treadmill, question low back pain with treadmill  Vascepa not covered previously, has not been taking this medication, remains on Lipitor  Limits alcohol intake, on allopurinol recent gout flareup  Occasional low  back pain no rotation of his leg    Medications, allergies, medical history, surgical history, social history, family history  reviewed and updated  Current Outpatient Medications   Medication Sig Dispense Refill    Nirmatrelvir & Ritonavir 20 x 150 MG & 10 x 100MG Tablet Therapy Pack Take 300 mg nirmatrelvir (two 150 mg tablets) with 100 mg ritonavir (one 100 mg tablet) by mouth, with all three tablets taken together twice daily for 5 days. 30 Each 0    Icosapent Ethyl (VASCEPA) 1 g Cap Take 2 g by mouth 2 (two) times a day. 360 capsule 3    allopurinol (ZYLOPRIM) 300 MG Tab Take 1 tablet by mouth every day. 90 tablet 3    atorvastatin (LIPITOR) 20 MG Tab Take 1 tablet by mouth every day. 90 tablet 3    levothyroxine (SYNTHROID) 175 MCG Tab Take 1 tablet by mouth every morning on an empty stomach. 90 tablet 3    sertraline (ZOLOFT) 50 MG Tab Take 1.5 Tablets by mouth every day. 135 tablet 3    metoprolol SR (TOPROL XL) 100 MG TABLET SR 24 HR Take 1 tablet by mouth every day. 90 tablet 3    lisinopril (PRINIVIL) 10 MG Tab Take 1 tablet by mouth every day. 90 tablet 3     No current facility-administered medications for this visit.         Allergic rhinitis  12/15/20 trial flonase, claritin made headache   1/25/21 has been using sudafed over-the-counter with success but we would like to avoid long-term use, will change to astelin instead  4/1/21 trial of otc zyrtec and flonase     Anxiety  8/19/16 RONALD score 10, PHQ 4, start zoloft 50 mg, declines psychotherapy  10/16/17 on zoloft 75 mg   12/3/19 on zoloft 75 mg add pamelor 10 mg for headache  8/31/20 on zoloft 75 mg off pamelor      Dyslipidemia  3/09 chol  299, trig 367,hdl 47, ldl 179 started on simcor no followup testing   6/10 chol 278,trig 366,hdl 51,ldl 154 add fish oil 2 pills bid, will not increase simcor due to hx gout  4/11 off simcor  4/11 chol 215,trig 232,hdl 39,ldl 130 off meds  12/31/11 chol 245,trig 240,hdl 48,ldl 149 off meds  10/12 chol 268,trig  239,hdl 54,ldl 166 start fish oil two pills daily  11/29/13 chol 265,trig 229,hdl 46,ldl 168  12/19/13 start lipitor 20 mg  3/27/14 chol 190,trig 164,hdl 51,ldl 106 on lipitor 20 mg  8/29/14 chol 208,trig 280,hdl 51,ldl 101 on lipitor 20 mg  10/15/15 chol 187,trig 223,hdl 46,ldl 96 on lipitor 20 mg  9/16/16 chol 182,trig 262,hdl 46,ldl 84 on lipitor 20 mg  10/4/17 chol 221,trig 324,hdl 51,ldl 105 on lipitor 20 mg  7/2/19 chol 216,trig 312,hdl 41,ldl 113 on lipitor 20 mg  10/20/20 chol 266,trig 475,hdl 43 on lipitor 20 mg  10/28/20 declines fibrate  12/11/20 chol 217,trig 326,hdl 38,ldl 114 on lipitor 20 mg  4/1/21 patient will check with insurance to see which is covered  7/29/21 chol 227,trig 452,hdl 39 on lipitor 20 mg      gout  On no medication for over 2 years  6/10 uric 9.6 monitor for gout flare up since on simcor  4/18/11 left elbow pain question gout flareup  4/11 uric 7.1  12/11 uric 7.0  1/17/12 gout flare up  10/12 uric 7.8  11/29/13 uric 7.9  8/29/14 uric 7.9  10/15/15 uric 8.8  9/16/16 uric 7.7  10/4/17 uric 8.7  7/2/19 uric 9.4  7/8/19 declines allopurinol  10/20/20 uric 9.7  10/28/20 start allopurinol 300 mg repeat uric and labs 4 weeks  12/11/20 uric 6.6 on allopurinol 300 mg  7/29/21 uric 5.2 on allopurinol 300 mg     headache  10/2/19 MRI brain negative  12/3/19 headache, question neuralgia, trial of pamelor 10 mg  12/23/19 increase pamelor to 20 mg  1/30/20 on pamelor 10 mg effective  8/31/20 orthostatic headache, referral neurology, trial of gabapentin, MRI already done, consider MRI with gadolinium  10/20/20 ESR 3  11/3/20  neurology note primary cough headache, obtain MRI head with and without, CTA head and follow up 2 months    2/26/21 MRI brain with and without per neurology, 5 x 11 mm small enhancing extra-axial lesion base medial right frontal lobe likely incidental meningioma  8/9/21 headaches improved with nasal lavage daily      Hearing loss  9/12  audiology note mild  high-frequency sensorineural loss right ear, brainstem response normal     History decreased GFR  6/21/10 bun 19,creat 0.98  10/19/12 bun 16,creat 0.90  11/27/13 bun 13,creat 0.98  8/28/14 bun 22,creat 1.0  9/16/16 bun 15,creat 1.04  10/3/17 bun 21,creat 1.04  7/2/19 bun 28,creat 1.38,GFR 52  10/19/20 bun 32,creat 1.43,GFR 50, PTH 34, SPEP negative,calcium 9.8,vit d 38, hep c ab negative  12/11/20 bun 14,creat 1.0,GFR>60,PTH 36,calcium 9.6,phos 3.5  7/29/21 bun 21,creat 1.02,GFR>60     history esophagitis  10/9/15 dyspepsia, chest x-ray, ultrasound, stress echo pending  10/29/15 ultrasound enlarged liver increased echotexture fatty infiltration or hepatocellular disease   11/31/15 TSH ordered for next month and labs for peripheral neuropathy feet, declines MRI lumbar spine repeat EMG NCS for now, cont limit etoh  11/19/15 stress echo mild concentric LVH on baseline echo, excellent exercise tolerance, negative stress echo for ischemia, brenna protocol 11 minutes 48 seconds  11/19/15 cxr negative  2/28/16 [facility] consultation note, solid food dysphagia, will arrange for EGD  3/8/18 EGD per [facility] grade B esophagitis and gastritis, biopsy performed, take prilosec 20 mg daily   3/23/18 [facility] note dysphagia, recent EGD grade B esophagitis, biopsies negative, swelling has improved, did not start omeprazole, unsure if this is hypertonic UES versus cricopharyngeal achalasia or hypertrophy given lack of eosinophilic esophagitis or schatzki's ring, recommend trial omeprazole for 2 months then wean off if possible     Hypertension  6/14/10 add lisinopril 10 mg to toprol  mg  3/15/13 treadmill thallium, treadmill component 10 minutes 48 seconds 1 mm upsloping ST segment depression nondiagnostic, hypertensive response to exercise; ejection fraction 61%, no ischemia noted  11/29/13 urine mac < 2.4 on lisinopril 10 mg and toprol  mg  10/15/15 urine mac <0.5 on lisinopril 10 mg and toprol  mg  11/19/15 stress echo mild  concentric LVH on baseline echo, excellent exercise tolerance, negative stress echo for ischemia, brenna protocol 11 minutes 48 seconds  9/16/16 urine mac<0.7 on lisinopril 10 mg and toprol  mg  10/4/17 urine mac 8.7 on lisinopril 10 mg and toprol  mg  2/8/18 treadmill stress test, brenna protocol 10 minutes, 12.8 METS, target heart rate achieved, no chest pain, no t-wave changes, normal stress EKG  12/3/19 increase lisinopril to 20 mg daily and continue toprol  mg  10/21/20 urine mac<1.2 on lisinopril 20 mg and toprol  mg  7/29/21 urine mac<1.2 on lisinopril 20 mg and toprol  mg     Hypothyroid  3/09 tsh 7.5 at that time had been on 0.2 mg half a tablet plus 0.125 for a total of 0.225 daily, this was changed to 0.2 mg one tablet po every Tuesday Thursday Saturday Sunday and 0.2 mg 1-1/2 tablets on Monday Wednesday Friday  6/10 tsh 0.148  6/23/10 change to levothyroxine 0.2 daily repeat in 6 weeks  8/27/10 tsh 2.7   4/11 tsh 0.6 on levothyroxine 200 mcg  10/12 tsh 27 on levothyroxine 200 mcg but not taking regularly  11/29/13 tsh 8.6 on levothyroxine 200 mcg daily  12/19/13 take levothyroxine 200 mg before eating  3/27/14 tsh 0.8 on levothyroxine 200 mcg  8/29/14 tsh 8.3 on levothyroxine 200 mcg taking thyroid med with supplements in am he will try to change to taking medication separate from everything, repeat labs 6 weeks  10/15/15 tsh 0.12 on levothyroxine 200 mcg daily change to levothyroxine 175 mcg  3/23/16 tsh 1.1 on levothyroxine 175 mcg  9/16/16 tsh 0.1 on levothyroxine 175 mcg change to one po six days per week, repeat tsh 6 weeks  1/30/17 tsh 1.3 on levothyroxine 175 mcg six days per week  10/4/17 tsh 2.1 on levothyroxine 175 mcg six days per week  7/2/19 tsh 8.6 on levothyroxine 175 mcg six days per week  7/8/19 change to levothyroxine 175 mcg daily repeat tsh 6 weeks  10/20/20 tsh 7.0 on levothyroxine 175 mcg taking six days per week, will change to daily  12/11/20 tsh 0.72  on levothyroxine 175 mcg daily    7/29/21 tsh 3.2 on levothyroxine 175 mcg daily      Insomnia  8/26/14 trial ambien 10 mg  9/7/17 continues on ambien, declines sleep psychology evaluation, work on taper     meningioma  10/2/19 MRI brain negative  11/3/20  neurology note primary cough headache, obtain MRI head with and without, CTA head and follow up 2 months    2/26/21 MRI brain with and without per neurology, 5 x 11 mm small enhancing extra-axial lesion base medial right frontal lobe likely incidental meningioma     Peripheral neuropathy  10/27/12 right foot neuropathy labs tsh 27, MAXIMUS,SPEP,folate,B12,B1,ESR negative, if negative consider EMG NCS  10/29/12 pt states not taking levothyroxine 200 mcg reg, will start daily and repeat labs tsh and lipid in 6 weeks  12/24/12 no improvement in symptoms when taking levothyroxine regularly, has decreased etoh  2/13 EMG NCS normal motor and sensory nerve conduction studies bilateral lower extremities     Preventive health  2/29/12 colon per [facility] repeat in 10 yrs  8/22/19 cologuard negative  10/20/20 vit d 38  10/20/20 hep c ab negative  10/20/20 psa 0.44  4/22/21 covid pfizer second  8/9/21 shingrix second  8/9/21 pneumovax  11/1/22 flu  11/1/22 tdap     Renal cyst  10/29/15 ultrasound enlarged liver increased echotexture fatty infiltration or hepatocellular disease, 3.2 cm probable complicated cystic exophytic lesion right kidney  11/30/15 MRI abdomen with and without; posterior lateral exophytic right kidney lesion 2.6 x 1.8 x 2.9 cm, no internal enhancement, consistent with benign cyst, also incidental exophytic 13 mm right renal cyst, no followup is necessary                  Patient Active Problem List   Diagnosis    Hypothyroid    Dyslipidemia    Rosacea    Hypertension    Gout    S/P appendectomy    Preventative health care    Erectile dysfunction    Hearing loss    Peripheral neuropathy    Obesity (BMI 30-39.9)    Insomnia    Renal cyst    History of  esophagitis    Anxiety    Headache    Allergic rhinitis    Meningioma (HCC)    History of COVID-19               Depression Screening  Little interest or pleasure in doing things?  0 - not at all  Feeling down, depressed , or hopeless? 0 - not at all  Patient Health Questionnaire Score: 0     If depressive symptoms identified deferred to follow up visit unless specifically addressed in assessment and plan.    Interpretation of PHQ-9 Total Score   Score Severity   1-4 No Depression   5-9 Mild Depression   10-14 Moderate Depression   15-19 Moderately Severe Depression   20-27 Severe Depression    Screening for Cognitive Impairment  Three Minute Recall (daughter, heaven, mountain) 3/3    Armando clock face with all 12 numbers and set the hands to show 10 past 11.  Yes    Cognitive concerns identified deferred for follow up unless specifically addressed in assessment and plan.    Fall Risk Assessment  Has the patient had two or more falls in the last year or any fall with injury in the last year?  No    Safety Assessment  Throw rugs on floor.  No  Handrails on all stairs.  Yes  Good lighting in all hallways.  Yes  Difficulty hearing.  Yes  Patient counseled about all safety risks that were identified.    Functional Assessment ADLs  Are there any barriers preventing you from cooking for yourself or meeting nutritional needs?  No.    Are there any barriers preventing you from driving safely or obtaining transportation?  No.    Are there any barriers preventing you from using a telephone or calling for help?  No.    Are there any barriers preventing you from shopping?  No.    Are there any barriers preventing you from taking care of your own finances?  No.    Are there any barriers preventing you from managing your medications?  No.    Are there any barriers preventing you from showering, bathing or dressing yourself?  No.    Are you currently engaging in any exercise or physical activity?  Yes.     What is your perception of  your health?  Good    Advance Care Planning  Do you have an Advance Directive, Living Will, Durable Power of , or POLST? No                       Patient Care Team:  Luis Echristiana Rutherford M.D. as PCP - General      ROS           Objective     BP (!) 148/86 (BP Location: Right arm, Patient Position: Sitting, BP Cuff Size: Adult)   Pulse 69   Temp 36.3 °C (97.4 °F)   Ht 1.829 m (6')   Wt 119 kg (262 lb)   SpO2 95%   BMI 35.53 kg/m²      Physical Exam  Vitals and nursing note reviewed.   Constitutional:       Appearance: Normal appearance.   HENT:      Head: Normocephalic and atraumatic.      Right Ear: External ear normal.      Left Ear: External ear normal.   Eyes:      Conjunctiva/sclera: Conjunctivae normal.   Cardiovascular:      Rate and Rhythm: Normal rate and regular rhythm.      Heart sounds: Normal heart sounds.   Pulmonary:      Effort: Pulmonary effort is normal.      Breath sounds: Normal breath sounds.   Abdominal:      General: There is no distension.   Musculoskeletal:         General: No swelling.   Skin:     Findings: No bruising.   Neurological:      General: No focal deficit present.      Mental Status: He is alert.   Psychiatric:         Mood and Affect: Mood normal.         Behavior: Behavior normal.                  Assessment & Plan      Assessment   #!  Medicare wellness visit    #2  Meningioma currently asymptomatic most recent MRI 2/26/21 MRI brain with and without per neurology, 5 x 11 mm small enhancing extra-axial lesion base medial right frontal lobe likely incidental meningioma    #3 hypothyroid on levothyroxine 175 mcg daily last TSH over a year ago 3.2    #4 hypertension on lisinopril, Toprol, not checking blood pressure reading, blood pressure has been elevated elevated today but he has started a regular exercise program    #5 gout on allopurinol last uric acid 5.2 over a year ago    #6 dyslipidemia on Lipitor, has not been taking Vascepa    #7 anxiety stable on Zoloft    #8  BMI 35.5    #9 occasional back pain, symptoms could be noticeable on the treadmill, consider atypical chest pain while exercising on the treadmill    #10 impaired glucose metabolism    #11 vitamin D deficiency      Plan   #1 health maintenance reviewed and updated    #2 check blood pressure regularly, continue lisinopril and Toprol, will not change his dosing yet, he will call me in 1 month with his updated blood pressures or send me the readings MyChart, continue to work on a good nutrition and exercise program, continue work on weight loss    #3 we will schedule treadmill stress test with hypertension, dyslipidemia, occasional atypical pains in his back, consider atypical presentation of chest pain with his risk factors    #4 restart vascepa     #5 labs    #6 flu vaccine high-dose, he can get the COVID-vaccine at the pharmacy    #6 TDA P    #7 xray lumbar spoine      #8 colon cancer screening referral    #9 MRI brain with and without follow-up meningioma    #10 we are out of the Prevnar 20    #11 follow-up 3 months blood pressure check    #12 continue other medications no changes pending labs

## 2022-11-08 ENCOUNTER — PATIENT MESSAGE (OUTPATIENT)
Dept: HEALTH INFORMATION MANAGEMENT | Facility: OTHER | Age: 67
End: 2022-11-08

## 2023-01-09 ENCOUNTER — HOSPITAL ENCOUNTER (OUTPATIENT)
Dept: LAB | Facility: MEDICAL CENTER | Age: 68
End: 2023-01-09
Attending: INTERNAL MEDICINE
Payer: MEDICARE

## 2023-01-09 ENCOUNTER — TELEPHONE (OUTPATIENT)
Dept: MEDICAL GROUP | Facility: MEDICAL CENTER | Age: 68
End: 2023-01-09

## 2023-01-09 ENCOUNTER — HOSPITAL ENCOUNTER (OUTPATIENT)
Dept: RADIOLOGY | Facility: MEDICAL CENTER | Age: 68
End: 2023-01-09
Attending: INTERNAL MEDICINE
Payer: MEDICARE

## 2023-01-09 DIAGNOSIS — D32.9 MENINGIOMA (HCC): ICD-10-CM

## 2023-01-09 DIAGNOSIS — E78.5 DYSLIPIDEMIA: ICD-10-CM

## 2023-01-09 DIAGNOSIS — M54.9 BACK PAIN, UNSPECIFIED BACK LOCATION, UNSPECIFIED BACK PAIN LATERALITY, UNSPECIFIED CHRONICITY: ICD-10-CM

## 2023-01-09 DIAGNOSIS — M10.9 GOUT OF FOOT, UNSPECIFIED CAUSE, UNSPECIFIED CHRONICITY, UNSPECIFIED LATERALITY: ICD-10-CM

## 2023-01-09 DIAGNOSIS — E55.9 VITAMIN D DEFICIENCY: ICD-10-CM

## 2023-01-09 DIAGNOSIS — I10 HYPERTENSION, UNSPECIFIED TYPE: ICD-10-CM

## 2023-01-09 DIAGNOSIS — R73.09 IMPAIRED GLUCOSE METABOLISM: ICD-10-CM

## 2023-01-09 DIAGNOSIS — Z12.5 PROSTATE CANCER SCREENING: ICD-10-CM

## 2023-01-09 PROBLEM — M54.50 LOW BACK PAIN: Status: ACTIVE | Noted: 2023-01-09

## 2023-01-09 LAB
25(OH)D3 SERPL-MCNC: 29 NG/ML (ref 30–100)
ALBUMIN SERPL BCP-MCNC: 4.4 G/DL (ref 3.2–4.9)
ALBUMIN/GLOB SERPL: 1.5 G/DL
ALP SERPL-CCNC: 79 U/L (ref 30–99)
ALT SERPL-CCNC: 36 U/L (ref 2–50)
ANION GAP SERPL CALC-SCNC: 12 MMOL/L (ref 7–16)
APPEARANCE UR: CLEAR
AST SERPL-CCNC: 25 U/L (ref 12–45)
BASOPHILS # BLD AUTO: 0.6 % (ref 0–1.8)
BASOPHILS # BLD: 0.04 K/UL (ref 0–0.12)
BILIRUB SERPL-MCNC: 0.4 MG/DL (ref 0.1–1.5)
BILIRUB UR QL STRIP.AUTO: NEGATIVE
BUN SERPL-MCNC: 23 MG/DL (ref 8–22)
CALCIUM ALBUM COR SERPL-MCNC: 9 MG/DL (ref 8.5–10.5)
CALCIUM SERPL-MCNC: 9.3 MG/DL (ref 8.5–10.5)
CHLORIDE SERPL-SCNC: 103 MMOL/L (ref 96–112)
CHOLEST SERPL-MCNC: 195 MG/DL (ref 100–199)
CO2 SERPL-SCNC: 23 MMOL/L (ref 20–33)
COLOR UR: YELLOW
CREAT SERPL-MCNC: 1.08 MG/DL (ref 0.5–1.4)
CREAT UR-MCNC: 158.4 MG/DL
EOSINOPHIL # BLD AUTO: 0.11 K/UL (ref 0–0.51)
EOSINOPHIL NFR BLD: 1.5 % (ref 0–6.9)
ERYTHROCYTE [DISTWIDTH] IN BLOOD BY AUTOMATED COUNT: 43.4 FL (ref 35.9–50)
EST. AVERAGE GLUCOSE BLD GHB EST-MCNC: 111 MG/DL
FASTING STATUS PATIENT QL REPORTED: NORMAL
GFR SERPLBLD CREATININE-BSD FMLA CKD-EPI: 75 ML/MIN/1.73 M 2
GLOBULIN SER CALC-MCNC: 2.9 G/DL (ref 1.9–3.5)
GLUCOSE SERPL-MCNC: 98 MG/DL (ref 65–99)
GLUCOSE UR STRIP.AUTO-MCNC: NEGATIVE MG/DL
HBA1C MFR BLD: 5.5 % (ref 4–5.6)
HCT VFR BLD AUTO: 48.7 % (ref 42–52)
HDLC SERPL-MCNC: 40 MG/DL
HGB BLD-MCNC: 15.8 G/DL (ref 14–18)
IMM GRANULOCYTES # BLD AUTO: 0.05 K/UL (ref 0–0.11)
IMM GRANULOCYTES NFR BLD AUTO: 0.7 % (ref 0–0.9)
KETONES UR STRIP.AUTO-MCNC: NEGATIVE MG/DL
LDLC SERPL CALC-MCNC: 106 MG/DL
LEUKOCYTE ESTERASE UR QL STRIP.AUTO: NEGATIVE
LYMPHOCYTES # BLD AUTO: 1.76 K/UL (ref 1–4.8)
LYMPHOCYTES NFR BLD: 24.2 % (ref 22–41)
MCH RBC QN AUTO: 30.4 PG (ref 27–33)
MCHC RBC AUTO-ENTMCNC: 32.4 G/DL (ref 33.7–35.3)
MCV RBC AUTO: 93.8 FL (ref 81.4–97.8)
MICRO URNS: NORMAL
MICROALBUMIN UR-MCNC: <1.2 MG/DL
MICROALBUMIN/CREAT UR: NORMAL MG/G (ref 0–30)
MONOCYTES # BLD AUTO: 0.53 K/UL (ref 0–0.85)
MONOCYTES NFR BLD AUTO: 7.3 % (ref 0–13.4)
NEUTROPHILS # BLD AUTO: 4.78 K/UL (ref 1.82–7.42)
NEUTROPHILS NFR BLD: 65.7 % (ref 44–72)
NITRITE UR QL STRIP.AUTO: NEGATIVE
NRBC # BLD AUTO: 0 K/UL
NRBC BLD-RTO: 0 /100 WBC
PH UR STRIP.AUTO: 5.5 [PH] (ref 5–8)
PLATELET # BLD AUTO: 194 K/UL (ref 164–446)
PMV BLD AUTO: 10.7 FL (ref 9–12.9)
POTASSIUM SERPL-SCNC: 4.5 MMOL/L (ref 3.6–5.5)
PROT SERPL-MCNC: 7.3 G/DL (ref 6–8.2)
PROT UR QL STRIP: NEGATIVE MG/DL
PSA SERPL-MCNC: 0.46 NG/ML (ref 0–4)
RBC # BLD AUTO: 5.19 M/UL (ref 4.7–6.1)
RBC UR QL AUTO: NEGATIVE
SODIUM SERPL-SCNC: 138 MMOL/L (ref 135–145)
SP GR UR STRIP.AUTO: 1.03
TRIGL SERPL-MCNC: 246 MG/DL (ref 0–149)
TSH SERPL DL<=0.005 MIU/L-ACNC: 1.35 UIU/ML (ref 0.38–5.33)
URATE SERPL-MCNC: 4.7 MG/DL (ref 2.5–8.3)
UROBILINOGEN UR STRIP.AUTO-MCNC: 0.2 MG/DL
WBC # BLD AUTO: 7.3 K/UL (ref 4.8–10.8)

## 2023-01-09 PROCEDURE — 85025 COMPLETE CBC W/AUTO DIFF WBC: CPT

## 2023-01-09 PROCEDURE — 84153 ASSAY OF PSA TOTAL: CPT | Mod: GA

## 2023-01-09 PROCEDURE — 80053 COMPREHEN METABOLIC PANEL: CPT

## 2023-01-09 PROCEDURE — 80061 LIPID PANEL: CPT

## 2023-01-09 PROCEDURE — 83036 HEMOGLOBIN GLYCOSYLATED A1C: CPT | Mod: GA

## 2023-01-09 PROCEDURE — A9579 GAD-BASE MR CONTRAST NOS,1ML: HCPCS | Performed by: INTERNAL MEDICINE

## 2023-01-09 PROCEDURE — 82306 VITAMIN D 25 HYDROXY: CPT

## 2023-01-09 PROCEDURE — 84550 ASSAY OF BLOOD/URIC ACID: CPT

## 2023-01-09 PROCEDURE — 70553 MRI BRAIN STEM W/O & W/DYE: CPT

## 2023-01-09 PROCEDURE — 82570 ASSAY OF URINE CREATININE: CPT

## 2023-01-09 PROCEDURE — 82043 UR ALBUMIN QUANTITATIVE: CPT

## 2023-01-09 PROCEDURE — 81003 URINALYSIS AUTO W/O SCOPE: CPT

## 2023-01-09 PROCEDURE — 84443 ASSAY THYROID STIM HORMONE: CPT

## 2023-01-09 PROCEDURE — 36415 COLL VENOUS BLD VENIPUNCTURE: CPT

## 2023-01-09 PROCEDURE — 72100 X-RAY EXAM L-S SPINE 2/3 VWS: CPT

## 2023-01-09 PROCEDURE — 700117 HCHG RX CONTRAST REV CODE 255: Performed by: INTERNAL MEDICINE

## 2023-01-09 RX ADMIN — GADOTERIDOL 20 ML: 279.3 INJECTION, SOLUTION INTRAVENOUS at 08:57

## 2023-01-10 ENCOUNTER — HOSPITAL ENCOUNTER (OUTPATIENT)
Dept: RADIOLOGY | Facility: MEDICAL CENTER | Age: 68
End: 2023-01-10
Attending: INTERNAL MEDICINE
Payer: MEDICARE

## 2023-01-10 PROBLEM — D12.6 ADENOMA OF COLON: Status: ACTIVE | Noted: 2023-01-10

## 2023-01-10 PROCEDURE — 93017 CV STRESS TEST TRACING ONLY: CPT

## 2023-01-10 PROCEDURE — 93018 CV STRESS TEST I&R ONLY: CPT | Performed by: INTERNAL MEDICINE

## 2023-01-10 NOTE — TELEPHONE ENCOUNTER
Notified of the MRI result, no change in size of stable meningioma, repeat MRI in 12 to 18 months.

## 2023-01-10 NOTE — TELEPHONE ENCOUNTER
Notified with lumbar spine x-ray result arthritic changes, otherwise no acute.  He states his back pain is better, will continue to monitor if worsens consider MRI and physical therapy.

## 2023-01-10 NOTE — TELEPHONE ENCOUNTER
Notified with labs, he is taking vitamin D have him to double his dose over-the-counter and take that daily.  Continue thyroid dose no changes, continue atorvastatin and Vascepa dose no change as cholesterol panel has improved, continue allopurinol no changes.  Continue to work on a good nutrition and exercise program.

## 2023-10-03 ENCOUNTER — OFFICE VISIT (OUTPATIENT)
Dept: MEDICAL GROUP | Facility: MEDICAL CENTER | Age: 68
End: 2023-10-03
Payer: MEDICARE

## 2023-10-03 ENCOUNTER — HOSPITAL ENCOUNTER (OUTPATIENT)
Dept: LAB | Facility: MEDICAL CENTER | Age: 68
End: 2023-10-03
Attending: PHYSICIAN ASSISTANT
Payer: MEDICARE

## 2023-10-03 VITALS
RESPIRATION RATE: 20 BRPM | TEMPERATURE: 97.8 F | HEART RATE: 63 BPM | HEIGHT: 73 IN | WEIGHT: 257 LBS | BODY MASS INDEX: 34.06 KG/M2 | OXYGEN SATURATION: 96 %

## 2023-10-03 DIAGNOSIS — I49.9 IRREGULAR HEARTBEAT: ICD-10-CM

## 2023-10-03 DIAGNOSIS — R07.89 CHEST DISCOMFORT: ICD-10-CM

## 2023-10-03 LAB
ALBUMIN SERPL BCP-MCNC: 4.5 G/DL (ref 3.2–4.9)
ALBUMIN/GLOB SERPL: 1.6 G/DL
ALP SERPL-CCNC: 84 U/L (ref 30–99)
ALT SERPL-CCNC: 29 U/L (ref 2–50)
ANION GAP SERPL CALC-SCNC: 12 MMOL/L (ref 7–16)
AST SERPL-CCNC: 24 U/L (ref 12–45)
BASOPHILS # BLD AUTO: 0.4 % (ref 0–1.8)
BASOPHILS # BLD: 0.04 K/UL (ref 0–0.12)
BILIRUB SERPL-MCNC: 0.4 MG/DL (ref 0.1–1.5)
BUN SERPL-MCNC: 30 MG/DL (ref 8–22)
CALCIUM ALBUM COR SERPL-MCNC: 9.4 MG/DL (ref 8.5–10.5)
CALCIUM SERPL-MCNC: 9.8 MG/DL (ref 8.5–10.5)
CHLORIDE SERPL-SCNC: 107 MMOL/L (ref 96–112)
CO2 SERPL-SCNC: 23 MMOL/L (ref 20–33)
CREAT SERPL-MCNC: 1.26 MG/DL (ref 0.5–1.4)
EOSINOPHIL # BLD AUTO: 0.15 K/UL (ref 0–0.51)
EOSINOPHIL NFR BLD: 1.7 % (ref 0–6.9)
ERYTHROCYTE [DISTWIDTH] IN BLOOD BY AUTOMATED COUNT: 44.5 FL (ref 35.9–50)
GFR SERPLBLD CREATININE-BSD FMLA CKD-EPI: 62 ML/MIN/1.73 M 2
GLOBULIN SER CALC-MCNC: 2.9 G/DL (ref 1.9–3.5)
GLUCOSE SERPL-MCNC: 101 MG/DL (ref 65–99)
HCT VFR BLD AUTO: 45.5 % (ref 42–52)
HGB BLD-MCNC: 15 G/DL (ref 14–18)
IMM GRANULOCYTES # BLD AUTO: 0.05 K/UL (ref 0–0.11)
IMM GRANULOCYTES NFR BLD AUTO: 0.6 % (ref 0–0.9)
LYMPHOCYTES # BLD AUTO: 2.34 K/UL (ref 1–4.8)
LYMPHOCYTES NFR BLD: 26.2 % (ref 22–41)
MCH RBC QN AUTO: 31.6 PG (ref 27–33)
MCHC RBC AUTO-ENTMCNC: 33 G/DL (ref 32.3–36.5)
MCV RBC AUTO: 95.8 FL (ref 81.4–97.8)
MONOCYTES # BLD AUTO: 0.7 K/UL (ref 0–0.85)
MONOCYTES NFR BLD AUTO: 7.8 % (ref 0–13.4)
NEUTROPHILS # BLD AUTO: 5.66 K/UL (ref 1.82–7.42)
NEUTROPHILS NFR BLD: 63.3 % (ref 44–72)
NRBC # BLD AUTO: 0 K/UL
NRBC BLD-RTO: 0 /100 WBC (ref 0–0.2)
NT-PROBNP SERPL IA-MCNC: 142 PG/ML (ref 0–125)
PLATELET # BLD AUTO: 221 K/UL (ref 164–446)
PMV BLD AUTO: 10.7 FL (ref 9–12.9)
POTASSIUM SERPL-SCNC: 4.9 MMOL/L (ref 3.6–5.5)
PROT SERPL-MCNC: 7.4 G/DL (ref 6–8.2)
RBC # BLD AUTO: 4.75 M/UL (ref 4.7–6.1)
SODIUM SERPL-SCNC: 142 MMOL/L (ref 135–145)
TROPONIN T SERPL-MCNC: 11 NG/L (ref 6–19)
TSH SERPL DL<=0.005 MIU/L-ACNC: 1.64 UIU/ML (ref 0.38–5.33)
WBC # BLD AUTO: 8.9 K/UL (ref 4.8–10.8)

## 2023-10-03 PROCEDURE — 36415 COLL VENOUS BLD VENIPUNCTURE: CPT

## 2023-10-03 PROCEDURE — 84443 ASSAY THYROID STIM HORMONE: CPT

## 2023-10-03 PROCEDURE — 85025 COMPLETE CBC W/AUTO DIFF WBC: CPT

## 2023-10-03 PROCEDURE — 84484 ASSAY OF TROPONIN QUANT: CPT

## 2023-10-03 PROCEDURE — 99215 OFFICE O/P EST HI 40 MIN: CPT | Performed by: PHYSICIAN ASSISTANT

## 2023-10-03 PROCEDURE — 83880 ASSAY OF NATRIURETIC PEPTIDE: CPT | Mod: GA

## 2023-10-03 PROCEDURE — 80053 COMPREHEN METABOLIC PANEL: CPT

## 2023-10-03 ASSESSMENT — FIBROSIS 4 INDEX: FIB4 SCORE: 1.44

## 2023-10-03 ASSESSMENT — PATIENT HEALTH QUESTIONNAIRE - PHQ9: CLINICAL INTERPRETATION OF PHQ2 SCORE: 0

## 2023-10-03 NOTE — PROGRESS NOTES
"Subjective:   Luan Schmidt is a 68 y.o. male here today for     HPI: Patient is here to discuss:  Problem   Irregular Heartbeat    5-day history of sensation of palpitations in the evening after having a glass of wine.  Denies chest pain or pressure or shortness of breath with exertion.  Has a history of hypothyroidism and takes levothyroxine.  Also hypertension treated with metoprolol lisinopril            Current medicines (including changes today)  Current Outpatient Medications   Medication Sig Dispense Refill    sertraline (ZOLOFT) 50 MG Tab TAKE 1 AND 1/2 TABLETS     DAILY 135 Tablet 3    allopurinol (ZYLOPRIM) 300 MG Tab TAKE 1 TABLET DAILY 90 Tablet 3    metoprolol SR (TOPROL XL) 100 MG TABLET SR 24 HR TAKE 1 TABLET DAILY 90 Tablet 3    lisinopril (PRINIVIL) 10 MG Tab TAKE 1 TABLET DAILY 90 Tablet 3    atorvastatin (LIPITOR) 20 MG Tab TAKE 1 TABLET DAILY 90 Tablet 3    levothyroxine (SYNTHROID) 175 MCG Tab TAKE 1 TABLET EVERY MORNINGON AN EMPTY STOMACH 90 Tablet 3    Icosapent Ethyl (VASCEPA) 1 g Cap Take 2 g by mouth 2 (two) times a day. 360 Capsule 3     No current facility-administered medications for this visit.     He  has a past medical history of Dyslipidemia (8/23/2009), Hypertension, and Renal cyst (10/29/2015).    He has no past medical history of Ulcer.  Patient has no known allergies.     Social History and Family History were reviewed and updated.    ROS   No headaches, chest pain, no shortness of breath, abdominal pain, nausea, or vomiting.  All other systems were reviewed and are negative or noted as positive in the HPI.       Objective:     Pulse 63   Temp 36.6 °C (97.8 °F) (Temporal)   Resp 20   Ht 1.85 m (6' 0.84\")   Wt 117 kg (257 lb)   SpO2 96%  Body mass index is 34.06 kg/m².     Physical Exam:  General: Patient appears well-nourished, well-hydrated, nontoxic  HEENT, normocephalic atraumatic, PERRLA, extraocular movements intact, nares are patent and clear  Neck: No visible " masses, thyromegaly or abnormalities noted  Cardiovascular.  Sitting comfortably without visible signs of edema  Lungs: No cyanosis noted, nondyspneic  Skin: Well perfused without evidence of rash or lesions  Neurological: Cranial nerves II through XII intact, normal gait  Musculoskeletal: Normal range of motion, normal strength and no deficit noted     Clinical Course/Lab Analysis:        Assessment and Plan:   The following treatment plan was discussed.  Signs and symptoms for which to return were discussed with patient at length.  Patient verbalized understanding.    Problem List Items Addressed This Visit       Irregular heartbeat     New and unstable  I reviewed stress test from November 2022 which shows no ischemic changes with exertion and normal EKG.  Today's EKG shows sinus bradycardia with a ventricular rate of 52.  No ST changes or signs of A-fib.  Did discuss with Dr. ramos who agrees with current assessment plan.  In order thyroid function as well as some cardiac labs and have him follow-up if symptoms worsen he is instructed to go straight to the ER.         Relevant Orders    EKG - Clinic Performed    TSH WITH REFLEX TO FT4    Comp Metabolic Panel    proBrain Natriuretic Peptide, NT    TROPONIN    CBC WITH DIFFERENTIAL     Other Visit Diagnoses       Chest discomfort        Relevant Orders    EKG - Clinic Performed             Discussed with him to go to emergency room ASAP if he develops chest pain, chest pressure, shortness of breath with exertion or dizziness    My total time spent caring for the patient on the day of the encounter was 43 minutes.   This does not include time spent on separately billable procedures/tests.   Followup: within 2 weeks with pcp    Please note that this dictation was created using voice recognition software. I have made every reasonable attempt to correct obvious errors, but I expect that there are errors of grammar and possibly content that I did not discover before  finalizing the note.

## 2023-10-03 NOTE — ASSESSMENT & PLAN NOTE
New and unstable  I reviewed stress test from November 2022 which shows no ischemic changes with exertion and normal EKG.  Today's EKG shows sinus bradycardia with a ventricular rate of 52.  No ST changes or signs of A-fib.  Did discuss with Dr. ramos who agrees with current assessment plan.  In order thyroid function as well as some cardiac labs and have him follow-up if symptoms worsen he is instructed to go straight to the ER.

## 2023-12-01 ENCOUNTER — TELEPHONE (OUTPATIENT)
Dept: MEDICAL GROUP | Facility: MEDICAL CENTER | Age: 68
End: 2023-12-01
Payer: MEDICARE

## 2023-12-01 RX ORDER — ICOSAPENT ETHYL 1000 MG/1
2 CAPSULE ORAL 2 TIMES DAILY
Qty: 360 CAPSULE | Refills: 0 | Status: SHIPPED | OUTPATIENT
Start: 2023-12-01 | End: 2024-02-21 | Stop reason: SDUPTHER

## 2023-12-01 NOTE — TELEPHONE ENCOUNTER
Received request via: Pharmacy    Was the patient seen in the last year in this department? Yes    Does the patient have an active prescription (recently filled or refills available) for medication(s) requested? yes    Does the patient have long-term Plus and need 100 day supply (blood pressure, diabetes and cholesterol meds only)? Patient does not have SCP   Requested Prescriptions     Pending Prescriptions Disp Refills    Icosapent Ethyl 1 g Cap [Pharmacy Med Name: ICOSAPENT ETHYL 1 GRAM CAPSULE] 360 Capsule 3     Sig: TAKE 2 CAPSULES BY MOUTH TWICE A DAY

## 2023-12-02 NOTE — TELEPHONE ENCOUNTER
Please call the patient and ask him to schedule a follow up appointment as we have not seen him in over a year.

## 2023-12-04 DIAGNOSIS — R45.86 MOOD DISTURBANCE: ICD-10-CM

## 2023-12-04 DIAGNOSIS — I10 ESSENTIAL HYPERTENSION: ICD-10-CM

## 2023-12-04 DIAGNOSIS — E78.5 DYSLIPIDEMIA: ICD-10-CM

## 2023-12-04 DIAGNOSIS — E03.9 HYPOTHYROIDISM, UNSPECIFIED TYPE: ICD-10-CM

## 2023-12-05 ENCOUNTER — TELEPHONE (OUTPATIENT)
Dept: MEDICAL GROUP | Facility: MEDICAL CENTER | Age: 68
End: 2023-12-05
Payer: MEDICARE

## 2023-12-05 RX ORDER — ATORVASTATIN CALCIUM 20 MG/1
TABLET, FILM COATED ORAL
Qty: 90 TABLET | Refills: 0 | Status: SHIPPED | OUTPATIENT
Start: 2023-12-05 | End: 2024-02-21 | Stop reason: SDUPTHER

## 2023-12-05 RX ORDER — LISINOPRIL 10 MG/1
TABLET ORAL
Qty: 90 TABLET | Refills: 0 | Status: SHIPPED | OUTPATIENT
Start: 2023-12-05 | End: 2024-02-21 | Stop reason: SDUPTHER

## 2023-12-05 RX ORDER — ALLOPURINOL 300 MG/1
TABLET ORAL
Qty: 90 TABLET | Refills: 0 | Status: SHIPPED | OUTPATIENT
Start: 2023-12-05 | End: 2024-02-21 | Stop reason: SDUPTHER

## 2023-12-05 RX ORDER — LEVOTHYROXINE SODIUM 175 UG/1
TABLET ORAL
Qty: 90 TABLET | Refills: 3 | Status: SHIPPED | OUTPATIENT
Start: 2023-12-05 | End: 2024-02-21 | Stop reason: SDUPTHER

## 2023-12-05 RX ORDER — METOPROLOL SUCCINATE 100 MG/1
TABLET, EXTENDED RELEASE ORAL
Qty: 90 TABLET | Refills: 0 | Status: SHIPPED | OUTPATIENT
Start: 2023-12-05 | End: 2024-02-21 | Stop reason: SDUPTHER

## 2023-12-06 NOTE — TELEPHONE ENCOUNTER
Please call the patient have him schedule a follow-up appointment, I have not seen him in over a year

## 2024-01-22 ENCOUNTER — TELEPHONE (OUTPATIENT)
Dept: HEALTH INFORMATION MANAGEMENT | Facility: OTHER | Age: 69
End: 2024-01-22
Payer: MEDICARE

## 2024-01-25 ENCOUNTER — OFFICE VISIT (OUTPATIENT)
Dept: MEDICAL GROUP | Facility: MEDICAL CENTER | Age: 69
End: 2024-01-25
Payer: MEDICARE

## 2024-01-25 VITALS
HEART RATE: 72 BPM | WEIGHT: 262 LBS | RESPIRATION RATE: 16 BRPM | HEIGHT: 72 IN | BODY MASS INDEX: 35.49 KG/M2 | OXYGEN SATURATION: 98 % | SYSTOLIC BLOOD PRESSURE: 122 MMHG | DIASTOLIC BLOOD PRESSURE: 70 MMHG | TEMPERATURE: 97.2 F

## 2024-01-25 DIAGNOSIS — I15.9 SECONDARY HYPERTENSION: Chronic | ICD-10-CM

## 2024-01-25 DIAGNOSIS — E66.9 OBESITY (BMI 30-39.9): ICD-10-CM

## 2024-01-25 DIAGNOSIS — I49.9 IRREGULAR HEARTBEAT: ICD-10-CM

## 2024-01-25 DIAGNOSIS — Z23 NEED FOR PNEUMOCOCCAL VACCINATION: ICD-10-CM

## 2024-01-25 DIAGNOSIS — E55.9 VITAMIN D DEFICIENCY: ICD-10-CM

## 2024-01-25 DIAGNOSIS — E53.8 B12 DEFICIENCY: ICD-10-CM

## 2024-01-25 DIAGNOSIS — M25.551 PAIN OF RIGHT HIP: ICD-10-CM

## 2024-01-25 DIAGNOSIS — M10.9 GOUT, UNSPECIFIED CAUSE, UNSPECIFIED CHRONICITY, UNSPECIFIED SITE: ICD-10-CM

## 2024-01-25 DIAGNOSIS — R79.0 LOW MAGNESIUM LEVEL: ICD-10-CM

## 2024-01-25 DIAGNOSIS — Z23 NEEDS FLU SHOT: ICD-10-CM

## 2024-01-25 DIAGNOSIS — Z87.19 HISTORY OF ESOPHAGITIS: ICD-10-CM

## 2024-01-25 DIAGNOSIS — Z12.5 PROSTATE CANCER SCREENING: ICD-10-CM

## 2024-01-25 DIAGNOSIS — E78.5 DYSLIPIDEMIA: Chronic | ICD-10-CM

## 2024-01-25 DIAGNOSIS — D32.9 MENINGIOMA (HCC): ICD-10-CM

## 2024-01-25 DIAGNOSIS — R00.2 PALPITATIONS: ICD-10-CM

## 2024-01-25 DIAGNOSIS — R73.09 IMPAIRED GLUCOSE METABOLISM: ICD-10-CM

## 2024-01-25 DIAGNOSIS — E03.9 HYPOTHYROIDISM, UNSPECIFIED TYPE: Chronic | ICD-10-CM

## 2024-01-25 PROCEDURE — 3074F SYST BP LT 130 MM HG: CPT | Performed by: INTERNAL MEDICINE

## 2024-01-25 PROCEDURE — 99214 OFFICE O/P EST MOD 30 MIN: CPT | Performed by: INTERNAL MEDICINE

## 2024-01-25 PROCEDURE — 3078F DIAST BP <80 MM HG: CPT | Performed by: INTERNAL MEDICINE

## 2024-01-25 RX ORDER — OMEPRAZOLE 20 MG/1
20 CAPSULE, DELAYED RELEASE ORAL DAILY
Qty: 100 CAPSULE | Refills: 2
Start: 2024-01-25 | End: 2024-02-21 | Stop reason: SDUPTHER

## 2024-01-25 ASSESSMENT — PATIENT HEALTH QUESTIONNAIRE - PHQ9: CLINICAL INTERPRETATION OF PHQ2 SCORE: 0

## 2024-01-25 ASSESSMENT — FIBROSIS 4 INDEX: FIB4 SCORE: 1.35

## 2024-01-25 NOTE — PROGRESS NOTES
Subjective     Luan Schmidt is a 68 y.o. male who presents with follow-up heart skipping sensation               HPI      Patient seen in October by Regina at that time has been having heart skipping sensation x 5 days, EKG sinus rhythm, labs ordered showing normal thyroid levels.  Patient states he will notice his heart skipping sensation or flutter type sensation twice a month, sometimes his heart rate on the at home ear monitor he has will go up to 170, these episodes do not seem to be related to activity, stress, caffeine intake.  He drinks rare coffee.  No associated chest pain, lightheadedness or syncope.  Hypertension remains on lisinopril, Toprol, treadmill stress test last January negative for ischemia, 11.2 METS.  He has still been using the treadmill for exercise but has cut back to twice a week because of right hip pain.  History of low back pain but this is more his right hip pain more noticeable with treadmill or activity, still is able to go 1.7 to 2.5 mph and incline on the treadmill for 30 minutes at a time.  No noticeable palpitations or skipping with treadmill use.  Hypothyroid on levothyroxine 175 mcg daily, gout on allopurinol 300 mg daily no flareup, reflux has been stable on Prilosec over-the-counter  Meningioma on MRI brain January 2023 no change from 2 years previously, no headache or vision changes has seen neurology          Current Outpatient Medications   Medication Sig Dispense Refill    metoprolol SR (TOPROL XL) 100 MG TABLET SR 24 HR TAKE 1 TABLET DAILY 90 Tablet 0    sertraline (ZOLOFT) 50 MG Tab TAKE 1 AND 1/2 TABLETS     DAILY 135 Tablet 0    atorvastatin (LIPITOR) 20 MG Tab TAKE 1 TABLET DAILY 90 Tablet 0    lisinopril (PRINIVIL) 10 MG Tab TAKE 1 TABLET DAILY 90 Tablet 0    levothyroxine (SYNTHROID) 175 MCG Tab TAKE 1 TABLET EVERY MORNINGON AN EMPTY STOMACH 90 Tablet 3    allopurinol (ZYLOPRIM) 300 MG Tab TAKE 1 TABLET DAILY 90 Tablet 0    Icosapent Ethyl 1 g Cap TAKE 2  CAPSULES BY MOUTH TWICE A  Capsule 0     No current facility-administered medications for this visit.           adenoma  1/9/23 colon per [facility] 4 polyps adenoma repeat in 3 years     Allergic rhinitis  12/15/20 trial flonase, claritin made headache   1/25/21 has been using sudafed over-the-counter with success but we would like to avoid long-term use, will change to astelin instead  4/1/21 trial of otc zyrtec and flonase     Anxiety  8/19/16 RONALD score 10, PHQ 4, start zoloft 50 mg, declines psychotherapy  10/16/17 on zoloft 75 mg   12/3/19 on zoloft 75 mg add pamelor 10 mg for headache  8/31/20 on zoloft 75 mg off pamelor   11/1/22 on zoloft 75 mg       Dyslipidemia  3/09 chol  299, trig 367,hdl 47, ldl 179 started on simcor no followup testing   6/10 chol 278,trig 366,hdl 51,ldl 154 add fish oil 2 pills bid, will not increase simcor due to hx gout  4/11 off simcor  4/11 chol 215,trig 232,hdl 39,ldl 130 off meds  12/31/11 chol 245,trig 240,hdl 48,ldl 149 off meds  10/12 chol 268,trig 239,hdl 54,ldl 166 start fish oil two pills daily  11/29/13 chol 265,trig 229,hdl 46,ldl 168  12/19/13 start lipitor 20 mg  3/27/14 chol 190,trig 164,hdl 51,ldl 106 on lipitor 20 mg  8/29/14 chol 208,trig 280,hdl 51,ldl 101 on lipitor 20 mg  10/15/15 chol 187,trig 223,hdl 46,ldl 96 on lipitor 20 mg  9/16/16 chol 182,trig 262,hdl 46,ldl 84 on lipitor 20 mg  10/4/17 chol 221,trig 324,hdl 51,ldl 105 on lipitor 20 mg  7/2/19 chol 216,trig 312,hdl 41,ldl 113 on lipitor 20 mg  10/20/20 chol 266,trig 475,hdl 43 on lipitor 20 mg  10/28/20 declines fibrate  12/11/20 chol 217,trig 326,hdl 38,ldl 114 on lipitor 20 mg  4/1/21 patient will check with insurance to see which is covered  7/29/21 chol 227,trig 452,hdl 39 on lipitor 20 mg   11/1/22 resume vascepa   1/9/23 chol 195,trig 246,hdl 40,ldl 106 on lipitor 20 mg and vascepa     gout  On no medication for over 2 years  6/10 uric 9.6 monitor for gout flare up since on simcor  4/18/11 left  elbow pain question gout flareup  4/11 uric 7.1  12/11 uric 7.0  1/17/12 gout flare up  10/12 uric 7.8  11/29/13 uric 7.9  8/29/14 uric 7.9  10/15/15 uric 8.8  9/16/16 uric 7.7  10/4/17 uric 8.7  7/2/19 uric 9.4  7/8/19 declines allopurinol  10/20/20 uric 9.7  10/28/20 start allopurinol 300 mg repeat uric and labs 4 weeks  12/11/20 uric 6.6 on allopurinol 300 mg  7/29/21 uric 5.2 on allopurinol 300 mg  1/9/23 uric 4.7 on allopurinol 300 mg     headache  10/2/19 MRI brain negative  12/3/19 headache, question neuralgia, trial of pamelor 10 mg  12/23/19 increase pamelor to 20 mg  1/30/20 on pamelor 10 mg effective  8/31/20 orthostatic headache, referral neurology, trial of gabapentin, MRI already done, consider MRI with gadolinium  10/20/20 ESR 3  11/3/20  neurology note primary cough headache, obtain MRI head with and without, CTA head and follow up 2 months    2/26/21 MRI brain with and without per neurology, 5 x 11 mm small enhancing extra-axial lesion base medial right frontal lobe likely incidental meningioma  8/9/21 headaches improved with nasal lavage daily   1/9/23 MRI brain with and without, stable tiny meningioma 9 x 5 x 7 mm, no mass-effect     Hearing loss  9/12  audiology note mild high-frequency sensorineural loss right ear, brainstem response normal     history esophagitis  10/9/15 dyspepsia, chest x-ray, ultrasound, stress echo pending  10/29/15 ultrasound enlarged liver increased echotexture fatty infiltration or hepatocellular disease   11/31/15 TSH ordered for next month and labs for peripheral neuropathy feet, declines MRI lumbar spine repeat EMG NCS for now, cont limit etoh  11/19/15 stress echo mild concentric LVH on baseline echo, excellent exercise tolerance, negative stress echo for ischemia, brenna protocol 11 minutes 48 seconds  11/19/15 cxr negative  2/28/16 [facility] consultation note, solid food dysphagia, will arrange for EGD  3/8/18 EGD per [facility] grade B esophagitis and  gastritis, biopsy performed, take prilosec 20 mg daily   3/23/18 [facility] note dysphagia, recent EGD grade B esophagitis, biopsies negative, swelling has improved, did not start omeprazole, unsure if this is hypertonic UES versus cricopharyngeal achalasia or hypertrophy given lack of eosinophilic esophagitis or schatzki's ring, recommend trial omeprazole for 2 months then wean off if possible     Hypertension  6/14/10 add lisinopril 10 mg to toprol  mg  3/15/13 treadmill thallium, treadmill component 10 minutes 48 seconds 1 mm upsloping ST segment depression nondiagnostic, hypertensive response to exercise; ejection fraction 61%, no ischemia noted  11/29/13 urine mac < 2.4 on lisinopril 10 mg and toprol  mg  10/15/15 urine mac <0.5 on lisinopril 10 mg and toprol  mg  11/19/15 stress echo mild concentric LVH on baseline echo, excellent exercise tolerance, negative stress echo for ischemia, brenna protocol 11 minutes 48 seconds  9/16/16 urine mac<0.7 on lisinopril 10 mg and toprol  mg  10/4/17 urine mac 8.7 on lisinopril 10 mg and toprol  mg  2/8/18 treadmill stress test, brenna protocol 10 minutes, 12.8 METS, target heart rate achieved, no chest pain, no t-wave changes, normal stress EKG  12/3/19 increase lisinopril to 20 mg daily and continue toprol  mg  10/21/20 urine mac<1.2 on lisinopril 20 mg and toprol  mg  7/29/21 urine mac<1.2 on lisinopril 20 mg and toprol  mg  1/9/23 urine mac<1.2 on lisinopril 20 mg and toprol  mg  1/10/23 treadmill stress test brenna protocol 9 minutes 30 seconds, 11.2 METs, no ischemic changes     Hypothyroid  3/09 tsh 7.5 at that time had been on 0.2 mg half a tablet plus 0.125 for a total of 0.225 daily, this was changed to 0.2 mg one tablet po every Tuesday Thursday Saturday Sunday and 0.2 mg 1-1/2 tablets on Monday Wednesday Friday  6/10 tsh 0.148  6/23/10 change to levothyroxine 0.2 daily repeat in 6 weeks  8/27/10 tsh 2.7   4/11 tsh 0.6  on levothyroxine 200 mcg  10/12 tsh 27 on levothyroxine 200 mcg but not taking regularly  11/29/13 tsh 8.6 on levothyroxine 200 mcg daily  12/19/13 take levothyroxine 200 mg before eating  3/27/14 tsh 0.8 on levothyroxine 200 mcg  8/29/14 tsh 8.3 on levothyroxine 200 mcg taking thyroid med with supplements in am he will try to change to taking medication separate from everything, repeat labs 6 weeks  10/15/15 tsh 0.12 on levothyroxine 200 mcg daily change to levothyroxine 175 mcg  3/23/16 tsh 1.1 on levothyroxine 175 mcg  9/16/16 tsh 0.1 on levothyroxine 175 mcg change to one po six days per week, repeat tsh 6 weeks  1/30/17 tsh 1.3 on levothyroxine 175 mcg six days per week  10/4/17 tsh 2.1 on levothyroxine 175 mcg six days per week  7/2/19 tsh 8.6 on levothyroxine 175 mcg six days per week  7/8/19 change to levothyroxine 175 mcg daily repeat tsh 6 weeks  10/20/20 tsh 7.0 on levothyroxine 175 mcg taking six days per week, will change to daily  12/11/20 tsh 0.72 on levothyroxine 175 mcg daily    7/29/21 tsh 3.2 on levothyroxine 175 mcg daily   1/9/23 tsh 1.3 on levothyroxine 175 mcg  10/3/23 tsh 1.6 on levothyroxine `175 mcg     meningioma  10/2/19 MRI brain negative  11/3/20  neurology note primary cough headache, obtain MRI head with and without, CTA head and follow up 2 months    2/26/21 MRI brain with and without per neurology, 5 x 11 mm small enhancing extra-axial lesion base medial right frontal lobe likely incidental meningioma  10/2/19 MRI brain negative  11/3/20  neurology note primary cough headache, obtain MRI head with and without, CTA head and follow up 2 months    2/26/21 MRI brain with and without per neurology, 5 x 11 mm small enhancing extra-axial lesion base medial right frontal lobe likely incidental meningioma  1/9/23 MRI brain with and without, stable tiny meningioma 9 x 5 x 7 mm, no mass-effect     Peripheral neuropathy  10/27/12 right foot neuropathy labs tsh 27,  MAXIMUS,SPEP,folate,B12,B1,ESR negative, if negative consider EMG NCS  10/29/12 pt states not taking levothyroxine 200 mcg reg, will start daily and repeat labs tsh and lipid in 6 weeks  12/24/12 no improvement in symptoms when taking levothyroxine regularly, has decreased etoh  2/13 EMG NCS normal motor and sensory nerve conduction studies bilateral lower extremities     Preventive health  10/20/20 hep c ab negative  8/9/21 shingrix second  8/9/21 pneumovax  11/1/22 tdap  1/9/23 A1c 5.5%  1/9/23 psa 0.46  1/9/23 vit d 29   1/9/23 colon per [facility] 4 polyps adenoma repeat in 3 years     Renal cyst  10/29/15 ultrasound enlarged liver increased echotexture fatty infiltration or hepatocellular disease, 3.2 cm probable complicated cystic exophytic lesion right kidney  11/30/15 MRI abdomen with and without; posterior lateral exophytic right kidney lesion 2.6 x 1.8 x 2.9 cm, no internal enhancement, consistent with benign cyst, also incidental exophytic 13 mm right renal cyst, no followup is necessary        Patient Active Problem List   Diagnosis    Hypothyroid    Dyslipidemia    Rosacea    Hypertension    Gout    S/P appendectomy    Preventative health care    Erectile dysfunction    Hearing loss    Peripheral neuropathy    Obesity (BMI 30-39.9)    Renal cyst    History of esophagitis    Anxiety    Headache    Allergic rhinitis    Meningioma (HCC)    History of COVID-19    Low back pain    Adenoma of colon    Irregular heartbeat              ROS           Objective          Physical Exam  Vitals and nursing note reviewed.   Constitutional:       Appearance: Normal appearance.   HENT:      Head: Normocephalic and atraumatic.      Right Ear: External ear normal.      Left Ear: External ear normal.   Eyes:      Conjunctiva/sclera: Conjunctivae normal.   Cardiovascular:      Rate and Rhythm: Normal rate and regular rhythm.      Heart sounds: Normal heart sounds. No murmur heard.  Pulmonary:      Effort: No respiratory distress.       Breath sounds: Normal breath sounds.   Abdominal:      General: There is no distension.      Palpations: Abdomen is soft.      Tenderness: There is no abdominal tenderness. There is no guarding.   Musculoskeletal:         General: No swelling.      Cervical back: Neck supple.   Skin:     Coloration: Skin is not jaundiced.      Findings: No bruising.   Neurological:      General: No focal deficit present.      Mental Status: He is alert.   Psychiatric:         Mood and Affect: Mood normal.         Behavior: Behavior normal.       Right hip diminished flexion, extension, internal and external rotation                      Assessment & Plan     Assessment  #1 palpitations with episodes of heart skipping sensation occurring about twice per month lasting for an hour at a time, thyroid level in October normal, no excess caffeinated beverage intake    #2 hypertension on lisinopril, Toprol    #3 hypothyroid on replacement levothyroxine 175 mcg    #4 gout on allopurinol    #5 dyslipidemia on Lipitor and Vascepa    #6 Right hip pain more noticeable recently, no regular NSAIDs    #7 reflux history of esophagitis on Prilosec last EGD 2018 no dysphagia, odynophagia, no breakthrough reflux on Prilosec, chronic PPI use can contribute to vitamin D, magnesium, B12 deficiency    #8 BMI 35.3 overweight, has not been able to exercise as much because of hip pain    #9 meningioma last MRI year, no change in size compared to MRI 2 years prior to that, asymptomatic no headache, vision changes, nausea, no difficulty with balance or coordination    Plan  #1 patient will sign up for [facility] and notify me when he is ready to have his mail away prescription sent there    #2 he will check on the dose of his omeprazole, I can send that to [facility]    #3 Labs ordered    #4 track and keep a diary of symptoms of heart skipping or racing writing down the date, time of day, blood pressure and heart rate at that time, continue to minimize caffeine  intake, he could consider getting a smart watch to track his heart rate, reviewed recent office visit in October for palpitations    #5 echo    #6  30-day event monitor given episodes of palpitations, skipping heartbeat sensation occurring twice a month of the since October, thyroid level has been controlled with no hypothyroidism on replacement, no significant caffeine intake, I believe the 30-day monitor is necessary given his palpitation symptoms, elevated heart rate into the 170 using ear monitor, and the fact that it occurs every 2 weeks, a 30-day monitor would be the most beneficial    #7 right hip x-ray, consider physical therapy depending on x-ray results    #8 continue work on a good nutrition and exercise program    #9 repeat MRI follow-up meningioma 1 year

## 2024-02-08 ENCOUNTER — NON-PROVIDER VISIT (OUTPATIENT)
Dept: CARDIOLOGY | Facility: MEDICAL CENTER | Age: 69
End: 2024-02-08
Attending: INTERNAL MEDICINE
Payer: MEDICARE

## 2024-02-08 DIAGNOSIS — R00.2 PALPITATIONS: ICD-10-CM

## 2024-02-08 NOTE — PROGRESS NOTES
Home enrollment completed in the 30 day [employer] heart monitor per Luis E Rutherford MD.  Monitor will be shipped to patient via [employer],  Pending Baseline  Pending EOS.

## 2024-02-21 ENCOUNTER — TELEPHONE (OUTPATIENT)
Dept: MEDICAL GROUP | Facility: MEDICAL CENTER | Age: 69
End: 2024-02-21
Payer: MEDICARE

## 2024-02-21 ENCOUNTER — HOSPITAL ENCOUNTER (OUTPATIENT)
Dept: LAB | Facility: MEDICAL CENTER | Age: 69
End: 2024-02-21
Attending: INTERNAL MEDICINE
Payer: MEDICARE

## 2024-02-21 DIAGNOSIS — R79.0 LOW MAGNESIUM LEVEL: ICD-10-CM

## 2024-02-21 DIAGNOSIS — E78.5 DYSLIPIDEMIA: Chronic | ICD-10-CM

## 2024-02-21 DIAGNOSIS — M10.9 GOUT, UNSPECIFIED CAUSE, UNSPECIFIED CHRONICITY, UNSPECIFIED SITE: ICD-10-CM

## 2024-02-21 DIAGNOSIS — E55.9 VITAMIN D DEFICIENCY: ICD-10-CM

## 2024-02-21 DIAGNOSIS — E03.9 HYPOTHYROIDISM, UNSPECIFIED TYPE: ICD-10-CM

## 2024-02-21 DIAGNOSIS — I15.9 SECONDARY HYPERTENSION: Chronic | ICD-10-CM

## 2024-02-21 DIAGNOSIS — I10 ESSENTIAL HYPERTENSION: ICD-10-CM

## 2024-02-21 DIAGNOSIS — E78.5 DYSLIPIDEMIA: ICD-10-CM

## 2024-02-21 DIAGNOSIS — R45.86 MOOD DISTURBANCE: ICD-10-CM

## 2024-02-21 DIAGNOSIS — Z12.5 PROSTATE CANCER SCREENING: ICD-10-CM

## 2024-02-21 DIAGNOSIS — R73.09 IMPAIRED GLUCOSE METABOLISM: ICD-10-CM

## 2024-02-21 DIAGNOSIS — E53.8 B12 DEFICIENCY: ICD-10-CM

## 2024-02-21 PROBLEM — K21.9 GERD (GASTROESOPHAGEAL REFLUX DISEASE): Status: ACTIVE | Noted: 2024-02-21

## 2024-02-21 LAB
25(OH)D3 SERPL-MCNC: 32 NG/ML (ref 30–100)
ALBUMIN SERPL BCP-MCNC: 4 G/DL (ref 3.2–4.9)
ALBUMIN/GLOB SERPL: 1.3 G/DL
ALP SERPL-CCNC: 73 U/L (ref 30–99)
ALT SERPL-CCNC: 30 U/L (ref 2–50)
ANION GAP SERPL CALC-SCNC: 12 MMOL/L (ref 7–16)
APPEARANCE UR: CLEAR
AST SERPL-CCNC: 28 U/L (ref 12–45)
BASOPHILS # BLD AUTO: 0.6 % (ref 0–1.8)
BASOPHILS # BLD: 0.04 K/UL (ref 0–0.12)
BILIRUB SERPL-MCNC: 0.6 MG/DL (ref 0.1–1.5)
BILIRUB UR QL STRIP.AUTO: NEGATIVE
BUN SERPL-MCNC: 21 MG/DL (ref 8–22)
CALCIUM ALBUM COR SERPL-MCNC: 9.3 MG/DL (ref 8.5–10.5)
CALCIUM SERPL-MCNC: 9.3 MG/DL (ref 8.5–10.5)
CHLORIDE SERPL-SCNC: 104 MMOL/L (ref 96–112)
CHOLEST SERPL-MCNC: 209 MG/DL (ref 100–199)
CO2 SERPL-SCNC: 24 MMOL/L (ref 20–33)
COLOR UR: YELLOW
CREAT SERPL-MCNC: 1.03 MG/DL (ref 0.5–1.4)
CREAT UR-MCNC: 136.95 MG/DL
EOSINOPHIL # BLD AUTO: 0.23 K/UL (ref 0–0.51)
EOSINOPHIL NFR BLD: 3.2 % (ref 0–6.9)
ERYTHROCYTE [DISTWIDTH] IN BLOOD BY AUTOMATED COUNT: 42.8 FL (ref 35.9–50)
EST. AVERAGE GLUCOSE BLD GHB EST-MCNC: 117 MG/DL
GFR SERPLBLD CREATININE-BSD FMLA CKD-EPI: 79 ML/MIN/1.73 M 2
GLOBULIN SER CALC-MCNC: 3.2 G/DL (ref 1.9–3.5)
GLUCOSE SERPL-MCNC: 115 MG/DL (ref 65–99)
GLUCOSE UR STRIP.AUTO-MCNC: NEGATIVE MG/DL
HBA1C MFR BLD: 5.7 % (ref 4–5.6)
HCT VFR BLD AUTO: 46.5 % (ref 42–52)
HDLC SERPL-MCNC: 40 MG/DL
HGB BLD-MCNC: 15.8 G/DL (ref 14–18)
IMM GRANULOCYTES # BLD AUTO: 0.02 K/UL (ref 0–0.11)
IMM GRANULOCYTES NFR BLD AUTO: 0.3 % (ref 0–0.9)
KETONES UR STRIP.AUTO-MCNC: NEGATIVE MG/DL
LDLC SERPL CALC-MCNC: 92 MG/DL
LEUKOCYTE ESTERASE UR QL STRIP.AUTO: NEGATIVE
LYMPHOCYTES # BLD AUTO: 2.01 K/UL (ref 1–4.8)
LYMPHOCYTES NFR BLD: 27.7 % (ref 22–41)
MAGNESIUM SERPL-MCNC: 1.8 MG/DL (ref 1.5–2.5)
MCH RBC QN AUTO: 31.3 PG (ref 27–33)
MCHC RBC AUTO-ENTMCNC: 34 G/DL (ref 32.3–36.5)
MCV RBC AUTO: 92.3 FL (ref 81.4–97.8)
MICRO URNS: NORMAL
MICROALBUMIN UR-MCNC: <1.2 MG/DL
MICROALBUMIN/CREAT UR: NORMAL MG/G (ref 0–30)
MONOCYTES # BLD AUTO: 0.62 K/UL (ref 0–0.85)
MONOCYTES NFR BLD AUTO: 8.6 % (ref 0–13.4)
NEUTROPHILS # BLD AUTO: 4.33 K/UL (ref 1.82–7.42)
NEUTROPHILS NFR BLD: 59.6 % (ref 44–72)
NITRITE UR QL STRIP.AUTO: NEGATIVE
NRBC # BLD AUTO: 0 K/UL
NRBC BLD-RTO: 0 /100 WBC (ref 0–0.2)
PH UR STRIP.AUTO: 5.5 [PH] (ref 5–8)
PLATELET # BLD AUTO: 187 K/UL (ref 164–446)
PMV BLD AUTO: 10.3 FL (ref 9–12.9)
POTASSIUM SERPL-SCNC: 4.2 MMOL/L (ref 3.6–5.5)
PROT SERPL-MCNC: 7.2 G/DL (ref 6–8.2)
PROT UR QL STRIP: NEGATIVE MG/DL
PSA SERPL-MCNC: 0.51 NG/ML (ref 0–4)
RBC # BLD AUTO: 5.04 M/UL (ref 4.7–6.1)
RBC UR QL AUTO: NEGATIVE
SODIUM SERPL-SCNC: 140 MMOL/L (ref 135–145)
SP GR UR STRIP.AUTO: 1.02
TRIGL SERPL-MCNC: 385 MG/DL (ref 0–149)
TSH SERPL DL<=0.005 MIU/L-ACNC: 1.55 UIU/ML (ref 0.38–5.33)
UROBILINOGEN UR STRIP.AUTO-MCNC: 0.2 MG/DL
VIT B12 SERPL-MCNC: 448 PG/ML (ref 211–911)
WBC # BLD AUTO: 7.3 K/UL (ref 4.8–10.8)

## 2024-02-21 PROCEDURE — 85025 COMPLETE CBC W/AUTO DIFF WBC: CPT

## 2024-02-21 PROCEDURE — 81003 URINALYSIS AUTO W/O SCOPE: CPT

## 2024-02-21 PROCEDURE — 83735 ASSAY OF MAGNESIUM: CPT

## 2024-02-21 PROCEDURE — 80061 LIPID PANEL: CPT

## 2024-02-21 PROCEDURE — 82570 ASSAY OF URINE CREATININE: CPT

## 2024-02-21 PROCEDURE — 84443 ASSAY THYROID STIM HORMONE: CPT

## 2024-02-21 PROCEDURE — 80053 COMPREHEN METABOLIC PANEL: CPT

## 2024-02-21 PROCEDURE — 36415 COLL VENOUS BLD VENIPUNCTURE: CPT

## 2024-02-21 PROCEDURE — 83036 HEMOGLOBIN GLYCOSYLATED A1C: CPT

## 2024-02-21 PROCEDURE — 84153 ASSAY OF PSA TOTAL: CPT

## 2024-02-21 PROCEDURE — 82043 UR ALBUMIN QUANTITATIVE: CPT

## 2024-02-21 PROCEDURE — 82306 VITAMIN D 25 HYDROXY: CPT

## 2024-02-21 PROCEDURE — 82607 VITAMIN B-12: CPT

## 2024-02-21 RX ORDER — ATORVASTATIN CALCIUM 20 MG/1
20 TABLET, FILM COATED ORAL DAILY
Qty: 100 TABLET | Refills: 2 | Status: SHIPPED | OUTPATIENT
Start: 2024-02-21

## 2024-02-21 RX ORDER — LEVOTHYROXINE SODIUM 175 UG/1
175 TABLET ORAL
Qty: 100 TABLET | Refills: 2 | Status: SHIPPED | OUTPATIENT
Start: 2024-02-21

## 2024-02-21 RX ORDER — ALLOPURINOL 300 MG/1
300 TABLET ORAL DAILY
Qty: 100 TABLET | Refills: 2 | Status: SHIPPED | OUTPATIENT
Start: 2024-02-21

## 2024-02-21 RX ORDER — LISINOPRIL 10 MG/1
10 TABLET ORAL DAILY
Qty: 100 TABLET | Refills: 2 | Status: SHIPPED | OUTPATIENT
Start: 2024-02-21

## 2024-02-21 RX ORDER — METOPROLOL SUCCINATE 100 MG/1
100 TABLET, EXTENDED RELEASE ORAL DAILY
Qty: 100 TABLET | Refills: 2 | Status: SHIPPED | OUTPATIENT
Start: 2024-02-21 | End: 2024-03-11 | Stop reason: SDUPTHER

## 2024-02-21 RX ORDER — ICOSAPENT ETHYL 1000 MG/1
2 CAPSULE ORAL 2 TIMES DAILY
Qty: 400 CAPSULE | Refills: 2 | Status: SHIPPED | OUTPATIENT
Start: 2024-02-21

## 2024-02-21 RX ORDER — OMEPRAZOLE 20 MG/1
20 CAPSULE, DELAYED RELEASE ORAL DAILY
Qty: 100 CAPSULE | Refills: 2 | Status: SHIPPED | OUTPATIENT
Start: 2024-02-21

## 2024-02-22 NOTE — TELEPHONE ENCOUNTER
Notified with labs, cholesterol stable, triglycerides elevated but he has not been eating as well this past week due to his wife's birthday, also he will sometimes miss the morning vascepa dose, try to take Vascepa 2 tablets at lunch and 2 tablets with dinner, recheck cholesterol 3 months, I did not order the uric acid as the lab could not add that order on so we will repeat his uric acid this summer as well.  Remainder of the labs reviewed.  Recommend trying to take vitamin D, 2 tablets every day.  He did have an elevated blood pressure on a few occasions, advised him to check his blood pressure twice a day and send me an update in [employer] next week.  Continue to work on his good nutrition and exercise program.  Lab slip printed for lipid panel and uric acid to mail to him to be done in 3 to 4 months.

## 2024-02-25 ENCOUNTER — TELEPHONE (OUTPATIENT)
Dept: MEDICAL GROUP | Facility: PHYSICIAN GROUP | Age: 69
End: 2024-02-25
Payer: MEDICARE

## 2024-02-25 NOTE — TELEPHONE ENCOUNTER
After hours phone call  Call on 2/25/2024 at 12:13 PM from Luan Schmidt There are no phone numbers on file. who reports PCP: Luis E Rutherford M.D..    Pt reports: call from Yusuf from [employer] for EKG notification. New onset a flutter with with rate of 103. Patient is currently on a heart    Plan: phone call placed to pt regarding new rhythm to [phone]. Voicemail left for pt to return call.     MERVAT Ochoa.

## 2024-02-26 ENCOUNTER — TELEPHONE (OUTPATIENT)
Dept: CARDIOLOGY | Facility: MEDICAL CENTER | Age: 69
End: 2024-02-26
Payer: MEDICARE

## 2024-02-26 NOTE — TELEPHONE ENCOUNTER
Tamie Morales M.D.  You3 minutes ago (3:30 PM)       Looks to be AF. Patient should reach out to PCP to discuss starting OAC as soon as able. Thank you

## 2024-02-26 NOTE — TELEPHONE ENCOUNTER
Patients PCP office contacted patient regarding urgent report. Patient not established with our office.    HK- please read and sign urgent report

## 2024-02-28 NOTE — TELEPHONE ENCOUNTER
ASAEL    Caller: Luan Christiansen     Topic/issue: Patient is returning Benitez's call from 2/26, please call.     Callback Number: [phone]      Thank You   Audrey VILCHIS

## 2024-02-28 NOTE — TELEPHONE ENCOUNTER
Returned pt's call. Informed him of results and recommendations. Pt states that he will reach out to PCP now.

## 2024-03-01 ENCOUNTER — TELEPHONE (OUTPATIENT)
Dept: MEDICAL GROUP | Facility: MEDICAL CENTER | Age: 69
End: 2024-03-01
Payer: MEDICARE

## 2024-03-01 DIAGNOSIS — I48.91 ATRIAL FIBRILLATION, UNSPECIFIED TYPE (HCC): ICD-10-CM

## 2024-03-11 ENCOUNTER — OFFICE VISIT (OUTPATIENT)
Dept: CARDIOLOGY | Facility: MEDICAL CENTER | Age: 69
End: 2024-03-11
Attending: INTERNAL MEDICINE
Payer: MEDICARE

## 2024-03-11 VITALS
BODY MASS INDEX: 35.73 KG/M2 | HEIGHT: 72 IN | HEART RATE: 65 BPM | OXYGEN SATURATION: 96 % | SYSTOLIC BLOOD PRESSURE: 128 MMHG | RESPIRATION RATE: 14 BRPM | WEIGHT: 263.8 LBS | DIASTOLIC BLOOD PRESSURE: 76 MMHG

## 2024-03-11 DIAGNOSIS — I15.9 SECONDARY HYPERTENSION: Chronic | ICD-10-CM

## 2024-03-11 DIAGNOSIS — Z76.89 ESTABLISHING CARE WITH NEW DOCTOR, ENCOUNTER FOR: ICD-10-CM

## 2024-03-11 DIAGNOSIS — I10 ESSENTIAL HYPERTENSION: ICD-10-CM

## 2024-03-11 DIAGNOSIS — R01.1 UNDIAGNOSED CARDIAC MURMURS: ICD-10-CM

## 2024-03-11 DIAGNOSIS — I49.9 IRREGULAR HEART BEAT: ICD-10-CM

## 2024-03-11 DIAGNOSIS — E78.5 DYSLIPIDEMIA: Chronic | ICD-10-CM

## 2024-03-11 DIAGNOSIS — I49.9 IRREGULAR HEARTBEAT: ICD-10-CM

## 2024-03-11 PROCEDURE — 3074F SYST BP LT 130 MM HG: CPT | Performed by: INTERNAL MEDICINE

## 2024-03-11 PROCEDURE — 93005 ELECTROCARDIOGRAM TRACING: CPT | Performed by: INTERNAL MEDICINE

## 2024-03-11 PROCEDURE — 99204 OFFICE O/P NEW MOD 45 MIN: CPT | Performed by: INTERNAL MEDICINE

## 2024-03-11 PROCEDURE — 3078F DIAST BP <80 MM HG: CPT | Performed by: INTERNAL MEDICINE

## 2024-03-11 PROCEDURE — 99212 OFFICE O/P EST SF 10 MIN: CPT | Performed by: INTERNAL MEDICINE

## 2024-03-11 RX ORDER — METOPROLOL SUCCINATE 50 MG/1
50 TABLET, EXTENDED RELEASE ORAL DAILY
Qty: 90 TABLET | Refills: 3 | Status: SHIPPED | OUTPATIENT
Start: 2024-03-11

## 2024-03-11 ASSESSMENT — FIBROSIS 4 INDEX: FIB4 SCORE: 1.83

## 2024-03-11 NOTE — ASSESSMENT & PLAN NOTE
Blood pressures currently well-controlled on his current regimen however we will decrease his metoprolol therapy from 100 mg daily down to 50 mg daily.  He likely will need to increase his lisinopril to meet his blood pressure goal of less than 130/90 mmHg

## 2024-03-11 NOTE — PATIENT INSTRUCTIONS
Follow up in 2 months  Check echocardiogram  Decrease Metoprolol 50mg daily  Continue current medications  Call with questions.

## 2024-03-11 NOTE — ASSESSMENT & PLAN NOTE
Regarding his irregular heartbeat/palpitations he is currently wearing his 30-day event monitor in which a transmission was sent with concern for atrial fibrillation.  At this time I suspect that he is having sinus bradycardia/normal sinus rhythm with intermittent sinus arrhythmia and not actively having atrial fibrillation.  An extensive discussion was had with the patient regarding the documented heart rhythms at this time and his overall monitor his still ongoing for the next 4 to 5 days.  We will decrease his metoprolol therapy to try and allow for gradual elevations in his heart rates to see if his overall sinus rate will be the predominant driving force.  His EKG in the office does not demonstrate overt atrial fibrillation.  I have asked the patient to allow me to review with one of the electrophysiologist prior to making any decisions regarding blood thinners at this time.  We will check an echocardiogram as part of ongoing assessment to assess left atrial dimensions and valvular architecture

## 2024-03-11 NOTE — ASSESSMENT & PLAN NOTE
Regarding his murmur diagnosed on physical examination I have recommended that he undergo an echocardiogram to further delineate his valvular architecture

## 2024-03-11 NOTE — PROGRESS NOTES
[facility]    PatientName:Luan BrooksykowskiDate: 3/11/2024  :1955    68 y.o.PCP:Luis E Rutherford M.D.  MRN:[id number]          Problems and Plans    Dyslipidemia  Recommend continuation of ongoing statin therapy no changes are made at this time    Hypertension  Blood pressures currently well-controlled on his current regimen however we will decrease his metoprolol therapy from 100 mg daily down to 50 mg daily.  He likely will need to increase his lisinopril to meet his blood pressure goal of less than 130/90 mmHg    Irregular heartbeat  Regarding his irregular heartbeat/palpitations he is currently wearing his 30-day event monitor in which a transmission was sent with concern for atrial fibrillation.  At this time I suspect that he is having sinus bradycardia/normal sinus rhythm with intermittent sinus arrhythmia and not actively having atrial fibrillation.  An extensive discussion was had with the patient regarding the documented heart rhythms at this time and his overall monitor his still ongoing for the next 4 to 5 days.  We will decrease his metoprolol therapy to try and allow for gradual elevations in his heart rates to see if his overall sinus rate will be the predominant driving force.  His EKG in the office does not demonstrate overt atrial fibrillation.  I have asked the patient to allow me to review with one of the electrophysiologist prior to making any decisions regarding blood thinners at this time.  We will check an echocardiogram as part of ongoing assessment to assess left atrial dimensions and valvular architecture    Undiagnosed cardiac murmurs  Regarding his murmur diagnosed on physical examination I have recommended that he undergo an echocardiogram to further delineate his valvular architecture    Return in about 2 months (around 2024).      Encounter    Reason for Visit / Chief Complaint: Hypertension/palpitations    HPI    68-year-old male  with known dyslipidemia, hypertension, prior renal cyst, gout, depression presents in consultation for evaluation of hypertension and palpitations    Currently, he was has been feeling fair but over the course of the last 4 to 6 months has noticed a fluttering like sensation without identifiable triggers lasting a variable amount of time.  He will often times utilize his pulse oximeter when available and notes significant heart rate variability.  He was seen by his primary care provider for this irregular heart rhythm and prescribed a 30-day event monitor in which he is currently still wearing.  He does not have any associated symptoms when he gets his palpitations but it can last anywhere from a few minutes to upwards of several hours.  A formal transmission was sent on 2/26/2024 with concern for atrial fibrillation.      Past Medical History  Past Medical History:   Diagnosis Date    Dyslipidemia 8/23/2009    Hypertension     Renal cyst 10/29/2015     Past Surgical History  Past Surgical History:   Procedure Laterality Date    APPENDECTOMY       Social History  Social History     Socioeconomic History    Marital status:      Spouse name: Not on file    Number of children: Not on file    Years of education: Not on file    Highest education level: Not on file   Occupational History    Not on file   Tobacco Use    Smoking status: Former    Smokeless tobacco: Never    Tobacco comments:     15-20 y.a   Substance and Sexual Activity    Alcohol use: Yes     Alcohol/week: 0.0 oz     Comment: half bottle wine nightly     Drug use: No    Sexual activity: Not on file   Other Topics Concern    Not on file   Social History Narrative    Not on file     Social Determinants of Health     Financial Resource Strain: Not on file   Food Insecurity: Not on file   Transportation Needs: Not on file   Physical Activity: Not on file   Stress: Not on file   Social Connections: Not on file   Intimate Partner Violence: Not on file    Housing Stability: Not on file     Past Family History  Family History   Problem Relation Age of Onset    Heart Disease Father      Medication(s)    Current Outpatient Medications:     metoprolol SR, 50 mg, Oral, DAILY    omeprazole, 20 mg, Oral, DAILY, Taking    allopurinol, 300 mg, Oral, DAILY, Taking    atorvastatin, 20 mg, Oral, DAILY, Taking    levothyroxine, 175 mcg, Oral, AM ES, Taking    lisinopril, 10 mg, Oral, DAILY, Taking    sertraline, 75 mg, Oral, DAILY, Taking    Icosapent Ethyl, 2 Capsule, Oral, BID, Taking  Allergies  Patient has no known allergies.    Review of Systems    A comprehensive 10 system review was conducted and is negative except as noted above in the HPI or here.      Vital Signs  /76 (BP Location: Left arm, Patient Position: Sitting, BP Cuff Size: Adult)   Pulse 65   Resp 14   Ht 1.829 m (6')   Wt 120 kg (263 lb 12.8 oz)   SpO2 96%   BMI 35.78 kg/m²     Physical Exam  Constitutional:       Appearance: Normal appearance. He is obese.   HENT:      Head: Normocephalic and atraumatic.      Mouth/Throat:      Mouth: Mucous membranes are moist.      Pharynx: Oropharynx is clear.   Eyes:      Extraocular Movements: Extraocular movements intact.      Conjunctiva/sclera: Conjunctivae normal.   Cardiovascular:      Rate and Rhythm: Normal rate and regular rhythm.      Pulses: Normal pulses.      Heart sounds: Murmur (1 out of 6 apical systolic murmur with radiation to left axilla) heard.      No friction rub. No gallop.   Pulmonary:      Effort: Pulmonary effort is normal.      Breath sounds: Normal breath sounds.   Abdominal:      General: Bowel sounds are normal.      Palpations: Abdomen is soft.   Musculoskeletal:         General: Normal range of motion.      Cervical back: Normal range of motion and neck supple.   Skin:     General: Skin is warm and dry.   Neurological:      General: No focal deficit present.      Mental Status: He is alert and oriented to person, place, and  "time. Mental status is at baseline.   Psychiatric:         Mood and Affect: Mood normal.         Behavior: Behavior normal.         Thought Content: Thought content normal.         Judgment: Judgment normal.         Lab Results   Component Value Date/Time    TSHULTRASEN 1.550 02/21/2024 0916      No results found for: \"FREET4\"     Lab Results   Component Value Date/Time    HBA1C 5.7 (H) 02/21/2024 09:16 AM       Lab Results   Component Value Date/Time    CHOLSTRLTOT 209 (H) 02/21/2024 09:16 AM    LDL 92 02/21/2024 09:16 AM    HDL 40 02/21/2024 09:16 AM    TRIGLYCERIDE 385 (H) 02/21/2024 09:16 AM         Lab Results   Component Value Date/Time    SODIUM 140 02/21/2024 09:16 AM    POTASSIUM 4.2 02/21/2024 09:16 AM    CHLORIDE 104 02/21/2024 09:16 AM    CO2 24 02/21/2024 09:16 AM    GLUCOSE 115 (H) 02/21/2024 09:16 AM    BUN 21 02/21/2024 09:16 AM    CREATININE 1.03 02/21/2024 09:16 AM    CREATININE 0.90 10/19/2012 09:42 AM    BUNCREATRAT 22 (H) 08/28/2014 07:25 AM    BUNCREATRAT 18 10/19/2012 09:42 AM    GLOMRATE >59 06/21/2010 08:39 AM       Lab Results   Component Value Date/Time    ALKPHOSPHAT 73 02/21/2024 09:16 AM    ASTSGOT 28 02/21/2024 09:16 AM    ALTSGPT 30 02/21/2024 09:16 AM    TBILIRUBIN 0.6 02/21/2024 09:16 AM         Imaging  EKG demonstrates sinus rhythm/sinus bradycardia with sinus arrhythmia.  I reviewed interpreted EKG.  Findings are discussed with the patient    Echocardiogram  Pending      Total patient time was estimated to be 45 minutes consisting of chart review, direct patient interaction, medication renewal, plan development and overall communication with the cardiovascular team.        Electronically signed by:   Forrest Altman DO, MPH  [facility]    Portions of this note were completed using voice recognition software (Dragon Naturally speaking software) . Occasional transcription errors may have escaped proof reading. I have made every reasonable attempt " to correct obvious errors, but I expect that there are errors of grammar and possibly content that I did not discover before finalizing the note.

## 2024-03-13 ENCOUNTER — HOSPITAL ENCOUNTER (OUTPATIENT)
Dept: CARDIOLOGY | Facility: MEDICAL CENTER | Age: 69
End: 2024-03-13
Attending: INTERNAL MEDICINE
Payer: MEDICARE

## 2024-03-13 DIAGNOSIS — R01.1 UNDIAGNOSED CARDIAC MURMURS: ICD-10-CM

## 2024-03-13 DIAGNOSIS — I49.9 IRREGULAR HEART BEAT: ICD-10-CM

## 2024-03-13 LAB — EKG IMPRESSION: NORMAL

## 2024-03-13 PROCEDURE — 700117 HCHG RX CONTRAST REV CODE 255: Performed by: INTERNAL MEDICINE

## 2024-03-13 PROCEDURE — 93306 TTE W/DOPPLER COMPLETE: CPT

## 2024-03-13 PROCEDURE — 93010 ELECTROCARDIOGRAM REPORT: CPT | Performed by: INTERNAL MEDICINE

## 2024-03-13 RX ADMIN — HUMAN ALBUMIN MICROSPHERES AND PERFLUTREN 3 ML: 10; .22 INJECTION, SOLUTION INTRAVENOUS at 17:15

## 2024-03-15 LAB
LV EJECT FRACT MOD 2C 99903: 50.34
LV EJECT FRACT MOD 4C 99902: 72.39
LV EJECT FRACT MOD BP 99901: 63.54

## 2024-03-15 PROCEDURE — 93306 TTE W/DOPPLER COMPLETE: CPT | Mod: 26 | Performed by: INTERNAL MEDICINE

## 2024-03-18 ENCOUNTER — TELEPHONE (OUTPATIENT)
Dept: CARDIOLOGY | Facility: MEDICAL CENTER | Age: 69
End: 2024-03-18
Payer: MEDICARE

## 2024-05-10 ENCOUNTER — OFFICE VISIT (OUTPATIENT)
Dept: CARDIOLOGY | Facility: MEDICAL CENTER | Age: 69
End: 2024-05-10
Attending: INTERNAL MEDICINE
Payer: MEDICARE

## 2024-05-10 VITALS
HEIGHT: 72 IN | HEART RATE: 59 BPM | DIASTOLIC BLOOD PRESSURE: 82 MMHG | OXYGEN SATURATION: 95 % | BODY MASS INDEX: 36.19 KG/M2 | WEIGHT: 267.2 LBS | RESPIRATION RATE: 14 BRPM | SYSTOLIC BLOOD PRESSURE: 130 MMHG

## 2024-05-10 DIAGNOSIS — E78.5 DYSLIPIDEMIA: Chronic | ICD-10-CM

## 2024-05-10 DIAGNOSIS — I10 ESSENTIAL HYPERTENSION: ICD-10-CM

## 2024-05-10 DIAGNOSIS — I15.9 SECONDARY HYPERTENSION: Chronic | ICD-10-CM

## 2024-05-10 DIAGNOSIS — I48.0 PAROXYSMAL ATRIAL FIBRILLATION (HCC): ICD-10-CM

## 2024-05-10 DIAGNOSIS — I49.9 IRREGULAR HEART BEAT: ICD-10-CM

## 2024-05-10 LAB — EKG IMPRESSION: NORMAL

## 2024-05-10 PROCEDURE — 3075F SYST BP GE 130 - 139MM HG: CPT | Performed by: INTERNAL MEDICINE

## 2024-05-10 PROCEDURE — 3079F DIAST BP 80-89 MM HG: CPT | Performed by: INTERNAL MEDICINE

## 2024-05-10 PROCEDURE — 99214 OFFICE O/P EST MOD 30 MIN: CPT | Performed by: INTERNAL MEDICINE

## 2024-05-10 PROCEDURE — 93010 ELECTROCARDIOGRAM REPORT: CPT | Performed by: INTERNAL MEDICINE

## 2024-05-10 ASSESSMENT — FIBROSIS 4 INDEX: FIB4 SCORE: 1.83

## 2024-05-10 NOTE — PATIENT INSTRUCTIONS
Follow up in 4 months  Referral EP department for atrial fibrillation ablation  Start Eliquis 5mg twice per day  Continue current medications  Call with questions   Pt is here today for 6 month follow up  She states no other issues    She did not have a chance to get mammo and Dexa but she will schedule both

## 2024-05-10 NOTE — PROGRESS NOTES
[facility]    PatientName:Luan Patel IzykowskiDate: 5/10/2024  :1955    68 y.o.PCP:Luis E Rutherford M.D.  MRN:[id number]        Problems and Plans    Dyslipidemia  Recommend continuing ongoing Lipitor therapy as well as fish oil.  Not unreasonable to consider adding fibrate therapy to his overall regimen.    Hypertension  Blood pressures currently well-controlled on his current regimen no changes    Paroxysmal atrial fibrillation (HCC)  At this time patient is being referred to the electrophysiology department for consideration for atrial fibrillation ablation.  Overall his burden is low however in order to undergo ablation therapy would be recommended that he be started on Eliquis 5 mg twice daily.  Pending his overall response we will make further recommendations once he has been formally evaluated in the electrophysiology department.  Patient is amenable to his current course of care.  Prescriptions were sent to the pharmacy    Return in about 4 months (around 9/10/2024).      Encounter    Reason for Visit / Chief Complaint: Palpitations/paroxysmal atrial fibrillation    HPI    68-year-old male with known dyslipidemia, hypertension, prior renal cyst, gout, depression presents in clinic for ongoing management of his hypertension and subsequent palpitations.    Currently, he notes he has been feeling well and is looking eagerly looking forward to an [email or web address] fishing trip in 2024.  He has currently been taking his medications as scheduled.    Echocardiogram performed on 3/15/2024 demonstrated mild concentric left ventricular hypertrophy with preserved LV systolic function estimate ejection fraction is 60 to 65% with mild dilatation of left atrium.  No significant valvular disease.    Mobile cardiac outpatient telemetry demonstrated paroxysmal atrial fibrillation with 98% overall burden on the monitor with the longest episode being 1 hour 54 minutes predominantly  sinus rhythm          Past Medical History  Past Medical History:   Diagnosis Date    Dyslipidemia 8/23/2009    Hypertension     Renal cyst 10/29/2015     Past Surgical History  Past Surgical History:   Procedure Laterality Date    APPENDECTOMY       Social History  Social History     Socioeconomic History    Marital status:      Spouse name: Not on file    Number of children: Not on file    Years of education: Not on file    Highest education level: Not on file   Occupational History    Not on file   Tobacco Use    Smoking status: Former    Smokeless tobacco: Never    Tobacco comments:     15-20 y.a   Substance and Sexual Activity    Alcohol use: Yes     Alcohol/week: 0.0 oz     Comment: half bottle wine nightly     Drug use: No    Sexual activity: Not on file   Other Topics Concern    Not on file   Social History Narrative    Not on file     Social Determinants of Health     Financial Resource Strain: Not on file   Food Insecurity: Not on file   Transportation Needs: Not on file   Physical Activity: Not on file   Stress: Not on file   Social Connections: Not on file   Intimate Partner Violence: Not on file   Housing Stability: Not on file     Past Family History  Family History   Problem Relation Age of Onset    Heart Disease Father      Medication(s)    Current Outpatient Medications:     apixaban, 5 mg, Oral, BID    metoprolol SR, 50 mg, Oral, DAILY, Taking    omeprazole, 20 mg, Oral, DAILY, Taking    allopurinol, 300 mg, Oral, DAILY, Taking    atorvastatin, 20 mg, Oral, DAILY, Taking    levothyroxine, 175 mcg, Oral, AM ES, Taking    lisinopril, 10 mg, Oral, DAILY, Taking    sertraline, 75 mg, Oral, DAILY, Taking    Icosapent Ethyl, 2 Capsule, Oral, BID, Taking  Allergies  Patient has no known allergies.    Review of Systems    A comprehensive 10 system review was conducted and is negative except as noted above in the HPI or here.      Vital Signs  /82 (BP Location: Right arm, Patient Position:  "Sitting, BP Cuff Size: Adult)   Pulse (!) 59   Resp 14   Ht 1.829 m (6')   Wt 121 kg (267 lb 3.2 oz)   SpO2 95%   BMI 36.24 kg/m²     Physical Exam  Constitutional:       Appearance: Normal appearance. He is obese.   HENT:      Head: Normocephalic and atraumatic.      Mouth/Throat:      Mouth: Mucous membranes are moist.      Pharynx: Oropharynx is clear.   Eyes:      Extraocular Movements: Extraocular movements intact.      Conjunctiva/sclera: Conjunctivae normal.   Cardiovascular:      Rate and Rhythm: Normal rate and regular rhythm.      Pulses: Normal pulses.      Heart sounds: Normal heart sounds. No murmur heard.     No friction rub. No gallop.   Pulmonary:      Effort: Pulmonary effort is normal.      Breath sounds: Normal breath sounds. No wheezing, rhonchi or rales.   Abdominal:      General: Bowel sounds are normal.      Palpations: Abdomen is soft.   Musculoskeletal:         General: Normal range of motion.      Cervical back: Normal range of motion and neck supple.      Right lower leg: No edema.      Left lower leg: No edema.   Skin:     General: Skin is warm and dry.   Neurological:      General: No focal deficit present.      Mental Status: He is alert and oriented to person, place, and time. Mental status is at baseline.   Psychiatric:         Mood and Affect: Mood normal.         Behavior: Behavior normal.         Thought Content: Thought content normal.         Judgment: Judgment normal.         Lab Results   Component Value Date/Time    TSHULTRASEN 1.550 02/21/2024 0916      No results found for: \"FREET4\"     Lab Results   Component Value Date/Time    HBA1C 5.7 (H) 02/21/2024 09:16 AM       Lab Results   Component Value Date/Time    CHOLSTRLTOT 209 (H) 02/21/2024 09:16 AM    LDL 92 02/21/2024 09:16 AM    HDL 40 02/21/2024 09:16 AM    TRIGLYCERIDE 385 (H) 02/21/2024 09:16 AM         Lab Results   Component Value Date/Time    SODIUM 140 02/21/2024 09:16 AM    POTASSIUM 4.2 02/21/2024 09:16 AM "    CHLORIDE 104 02/21/2024 09:16 AM    CO2 24 02/21/2024 09:16 AM    GLUCOSE 115 (H) 02/21/2024 09:16 AM    BUN 21 02/21/2024 09:16 AM    CREATININE 1.03 02/21/2024 09:16 AM    CREATININE 0.90 10/19/2012 09:42 AM    BUNCREATRAT 22 (H) 08/28/2014 07:25 AM    BUNCREATRAT 18 10/19/2012 09:42 AM    GLOMRATE >59 06/21/2010 08:39 AM       Lab Results   Component Value Date/Time    ALKPHOSPHAT 73 02/21/2024 09:16 AM    ASTSGOT 28 02/21/2024 09:16 AM    ALTSGPT 30 02/21/2024 09:16 AM    TBILIRUBIN 0.6 02/21/2024 09:16 AM         Imaging  Sinus bradycardia.  I reviewed service EKG.  Findings are discussed with the patient        Total patient time was estimated to be 30 minutes consisting of chart review, direct patient interaction, medication renewal, plan development and overall communication with the cardiovascular team.        Electronically signed by:   Forrest Altman DO, MPH  [facility]    Portions of this note were completed using voice recognition software (Dragon Naturally speaking software) . Occasional transcription errors may have escaped proof reading. I have made every reasonable attempt to correct obvious errors, but I expect that there are errors of grammar and possibly content that I did not discover before finalizing the note.

## 2024-05-11 PROBLEM — I48.0 PAROXYSMAL ATRIAL FIBRILLATION (HCC): Status: ACTIVE | Noted: 2024-05-11

## 2024-05-11 PROBLEM — I49.9 IRREGULAR HEARTBEAT: Status: RESOLVED | Noted: 2023-10-03 | Resolved: 2024-05-11

## 2024-05-13 NOTE — ASSESSMENT & PLAN NOTE
At this time patient is being referred to the electrophysiology department for consideration for atrial fibrillation ablation.  Overall his burden is low however in order to undergo ablation therapy would be recommended that he be started on Eliquis 5 mg twice daily.  Pending his overall response we will make further recommendations once he has been formally evaluated in the electrophysiology department.  Patient is amenable to his current course of care.  Prescriptions were sent to the pharmacy

## 2024-05-13 NOTE — ASSESSMENT & PLAN NOTE
Recommend continuing ongoing Lipitor therapy as well as fish oil.  Not unreasonable to consider adding fibrate therapy to his overall regimen.

## 2024-05-17 ENCOUNTER — OFFICE VISIT (OUTPATIENT)
Dept: CARDIOLOGY | Facility: MEDICAL CENTER | Age: 69
End: 2024-05-17
Attending: INTERNAL MEDICINE
Payer: MEDICARE

## 2024-05-17 ENCOUNTER — PATIENT MESSAGE (OUTPATIENT)
Dept: CARDIOLOGY | Facility: MEDICAL CENTER | Age: 69
End: 2024-05-17

## 2024-05-17 VITALS
HEIGHT: 72 IN | BODY MASS INDEX: 35.21 KG/M2 | RESPIRATION RATE: 15 BRPM | OXYGEN SATURATION: 94 % | DIASTOLIC BLOOD PRESSURE: 72 MMHG | WEIGHT: 260 LBS | HEART RATE: 69 BPM | SYSTOLIC BLOOD PRESSURE: 130 MMHG

## 2024-05-17 DIAGNOSIS — I48.0 PAROXYSMAL ATRIAL FIBRILLATION (HCC): ICD-10-CM

## 2024-05-17 DIAGNOSIS — I10 ESSENTIAL HYPERTENSION: ICD-10-CM

## 2024-05-17 DIAGNOSIS — M10.9 GOUT OF FOOT, UNSPECIFIED CAUSE, UNSPECIFIED CHRONICITY, UNSPECIFIED LATERALITY: ICD-10-CM

## 2024-05-17 DIAGNOSIS — E03.9 HYPOTHYROIDISM, UNSPECIFIED TYPE: ICD-10-CM

## 2024-05-17 DIAGNOSIS — E78.5 DYSLIPIDEMIA: ICD-10-CM

## 2024-05-17 DIAGNOSIS — I15.9 SECONDARY HYPERTENSION: Chronic | ICD-10-CM

## 2024-05-17 DIAGNOSIS — R45.86 MOOD DISTURBANCE: ICD-10-CM

## 2024-05-17 DIAGNOSIS — K21.9 GASTROESOPHAGEAL REFLUX DISEASE, UNSPECIFIED WHETHER ESOPHAGITIS PRESENT: ICD-10-CM

## 2024-05-17 LAB — EKG IMPRESSION: NORMAL

## 2024-05-17 PROCEDURE — 3078F DIAST BP <80 MM HG: CPT | Performed by: NURSE PRACTITIONER

## 2024-05-17 PROCEDURE — RXMED WILLOW AMBULATORY MEDICATION CHARGE: Performed by: NURSE PRACTITIONER

## 2024-05-17 PROCEDURE — 3075F SYST BP GE 130 - 139MM HG: CPT | Performed by: NURSE PRACTITIONER

## 2024-05-17 PROCEDURE — 93010 ELECTROCARDIOGRAM REPORT: CPT | Performed by: INTERNAL MEDICINE

## 2024-05-17 PROCEDURE — 99214 OFFICE O/P EST MOD 30 MIN: CPT | Performed by: NURSE PRACTITIONER

## 2024-05-17 RX ORDER — LISINOPRIL 10 MG/1
10 TABLET ORAL DAILY
Qty: 100 TABLET | Refills: 2 | Status: SHIPPED | OUTPATIENT
Start: 2024-05-17

## 2024-05-17 RX ORDER — OMEPRAZOLE 20 MG/1
20 CAPSULE, DELAYED RELEASE ORAL DAILY
Qty: 100 CAPSULE | Refills: 2 | Status: SHIPPED | OUTPATIENT
Start: 2024-05-17

## 2024-05-17 RX ORDER — ATORVASTATIN CALCIUM 20 MG/1
20 TABLET, FILM COATED ORAL DAILY
Qty: 100 TABLET | Refills: 2 | Status: SHIPPED | OUTPATIENT
Start: 2024-05-17

## 2024-05-17 RX ORDER — LEVOTHYROXINE SODIUM 175 UG/1
175 TABLET ORAL
Qty: 100 TABLET | Refills: 2 | Status: SHIPPED | OUTPATIENT
Start: 2024-05-17

## 2024-05-17 RX ORDER — ALLOPURINOL 300 MG/1
300 TABLET ORAL DAILY
Qty: 100 TABLET | Refills: 2 | Status: SHIPPED | OUTPATIENT
Start: 2024-05-17

## 2024-05-17 RX ORDER — METOPROLOL SUCCINATE 50 MG/1
50 TABLET, EXTENDED RELEASE ORAL DAILY
Qty: 90 TABLET | Refills: 3 | Status: SHIPPED | OUTPATIENT
Start: 2024-05-17

## 2024-05-17 ASSESSMENT — FIBROSIS 4 INDEX: FIB4 SCORE: 1.83

## 2024-05-17 NOTE — PROGRESS NOTES
Atrial Fibrillation Clinic New Consult Note    DOS: 5/17/2024  [id number]  Luan Schmidt    Chief complaint/Reason for consult: atrial fibrillation long term care    HPI: Pt is a 68 y.o. male who presents to the clinic today in consultation for atrial fibrillation. Patient has a past medical history significant for but not limited to: hypothyroid, dyslipidemia, hypertension, atrial fibrillation, GERD. Patient here to discuss long term treatment strategy for his paroxysmal atrial fibrillation. Patient notices when he goes in and out and feels fatigued and palpitations in his chest. Patient currently tolerating his Eliquis dosing. Rate control with metoprolol. His EKG today shows sinus arrhythmia. Pathophysiology of atrial fibrillation as well as all treatment strategies discussed inde tail. The risks, benefits,and alternatives to Atrial Fibrillation ablation with general anesthesia were discussed in great detail. Catheter ablation is associated with less AF recurrence compared to antiarrhythmic medications per several studies (MICHAEL, EARLY-AF, STOP-AF).We discussed pulmonary vein isolation for therapeutic management and continued rhythm control. We discussed the risks and benefits of this procedure, with risks to include but not limited to: Risks include 1-3% risk of major cardiovascular event including stroke, myocardial infarction, phrenic nerve damage, esophageal injury and/or fistula formation, cardiac perforation, pericardial effusion, tamponade, major bleeding, or death.  Also mentioned was a 1/400 (0.25%) occurrence of atrial esophageal fistula, which is an abnormal communication between the esophagus and the left atrium; this complication is often fatal. Lastly the risks of death, myocardial infarction, stroke, DVT and PE were discussed. The patient verbalized understanding of these potential complications and wishes to proceed with this procedure.I quoted a 70 to 80% chance free of atrial fibrillation  at 12 months.  We discussed that he may also need a second procedure. The patients [id number] score is 2. HAS-BLED score is 2              Past Medical History:   Diagnosis Date    Dyslipidemia 8/23/2009    Hypertension     Renal cyst 10/29/2015       Past Surgical History:   Procedure Laterality Date    APPENDECTOMY         Social History     Socioeconomic History    Marital status:      Spouse name: Not on file    Number of children: Not on file    Years of education: Not on file    Highest education level: Not on file   Occupational History    Not on file   Tobacco Use    Smoking status: Former    Smokeless tobacco: Never    Tobacco comments:     15-20 y.a   Substance and Sexual Activity    Alcohol use: Yes     Alcohol/week: 0.0 oz     Comment: half bottle wine nightly     Drug use: No    Sexual activity: Not on file   Other Topics Concern    Not on file   Social History Narrative    Not on file     Social Determinants of Health     Financial Resource Strain: Not on file   Food Insecurity: Not on file   Transportation Needs: Not on file   Physical Activity: Not on file   Stress: Not on file   Social Connections: Not on file   Intimate Partner Violence: Not on file   Housing Stability: Not on file       Family History   Problem Relation Age of Onset    Heart Disease Father        No Known Allergies    Current Outpatient Medications   Medication Sig Dispense Refill    apixaban (ELIQUIS) 5mg Tab Take 1 Tablet by mouth 2 times a day. 180 Tablet 3    atorvastatin (LIPITOR) 20 MG Tab Take 1 Tablet by mouth every day. 100 Tablet 2    levothyroxine (SYNTHROID) 175 MCG Tab Take 1 Tablet by mouth every morning on an empty stomach. 100 Tablet 2    lisinopril (PRINIVIL) 10 MG Tab Take 1 Tablet by mouth every day. 100 Tablet 2    metoprolol SR (TOPROL XL) 50 MG TABLET SR 24 HR Take 1 Tablet by mouth every day. 90 Tablet 3    omeprazole (PRILOSEC) 20 MG delayed-release capsule Take 1 Capsule by mouth every day. 100  Capsule 2    sertraline (ZOLOFT) 50 MG Tab Take 1.5 Tablets by mouth every day. 150 Tablet 2    allopurinol (ZYLOPRIM) 300 MG Tab Take 1 Tablet by mouth every day. 100 Tablet 2    Icosapent Ethyl 1 g Cap Take 2 Capsules by mouth 2 times a day. 400 Capsule 2     No current facility-administered medications for this visit.       Vitals:    05/17/24 1518   BP: 130/72   Pulse: 69   Resp: 15   SpO2: 94%         Review of Systems   Constitutional: Negative.  Negative for malaise/fatigue.   HENT: Negative.     Eyes: Negative.    Respiratory: Negative.  Negative for shortness of breath and wheezing.    Cardiovascular:  Negative for chest pain, palpitations, orthopnea, claudication, leg swelling and PND.   Gastrointestinal: Negative.  Negative for nausea.   Genitourinary: Negative.    Musculoskeletal: Negative.    Skin: Negative.    Neurological: Negative.  Negative for dizziness and sensory change.   Endo/Heme/Allergies: Negative.  Does not bruise/bleed easily.   Psychiatric/Behavioral:  Negative for depression and hallucinations. The patient is not nervous/anxious.           EKG interpreted by me: Sinus arrhythmia     Physical Exam  Constitutional:       Appearance: Normal appearance.   HENT:      Head: Normocephalic.   Eyes:      Pupils: Pupils are equal, round, and reactive to light.   Neck:      Vascular: No JVD.   Cardiovascular:      Rate and Rhythm: Normal rate and regular rhythm.      Pulses: Normal pulses.      Heart sounds: Normal heart sounds.   Pulmonary:      Effort: Pulmonary effort is normal.      Breath sounds: Normal breath sounds.   Abdominal:      General: Abdomen is flat.      Palpations: Abdomen is soft.   Musculoskeletal:      Cervical back: Normal range of motion.      Right lower leg: No edema.      Left lower leg: No edema.   Skin:     General: Skin is warm and dry.   Neurological:      Mental Status: He is alert and oriented to person, place, and time.   Psychiatric:         Mood and Affect: Mood  "normal.         Behavior: Behavior normal.          Data:  Lipids:   Lab Results   Component Value Date/Time    CHOLSTRLTOT 209 (H) 02/21/2024 09:16 AM    TRIGLYCERIDE 385 (H) 02/21/2024 09:16 AM    HDL 40 02/21/2024 09:16 AM    LDL 92 02/21/2024 09:16 AM        BMP:  Lab Results   Component Value Date/Time    SODIUM 140 02/21/2024 0916    POTASSIUM 4.2 02/21/2024 0916    CHLORIDE 104 02/21/2024 0916    CO2 24 02/21/2024 0916    GLUCOSE 115 (H) 02/21/2024 0916    BUN 21 02/21/2024 0916    CREATININE 1.03 02/21/2024 0916    CALCIUM 9.3 02/21/2024 0916    ANION 12.0 02/21/2024 0916       GFR:  Lab Results   Component Value Date/Time    IFAFRICA >60 07/29/2021 0623    IFNOTAFR >60 07/29/2021 0623        TSH:   Lab Results   Component Value Date/Time    TSHULTRASEN 1.550 02/21/2024 0916       MAGNESIUM:  Lab Results   Component Value Date/Time    MAGNESIUM 1.8 02/21/2024 0916        THYROXINE (T4):   No results found for: \"FREEDIR\"     CBC:   Lab Results   Component Value Date/Time    WBC 7.3 02/21/2024 09:16 AM    WBC 5.1 10/19/2012 09:42 AM    RBC 5.04 02/21/2024 09:16 AM    RBC 4.66 10/19/2012 09:42 AM    HEMOGLOBIN 15.8 02/21/2024 09:16 AM    HEMATOCRIT 46.5 02/21/2024 09:16 AM    MCV 92.3 02/21/2024 09:16 AM    MCV 94 10/19/2012 09:42 AM    MCH 31.3 02/21/2024 09:16 AM    MCH 32.2 10/19/2012 09:42 AM    MCHC 34.0 02/21/2024 09:16 AM    RDW 42.8 02/21/2024 09:16 AM    RDW 13.7 10/19/2012 09:42 AM    PLATELETCT 187 02/21/2024 09:16 AM    MPV 10.3 02/21/2024 09:16 AM    NEUTSPOLYS 59.60 02/21/2024 09:16 AM    LYMPHOCYTES 27.70 02/21/2024 09:16 AM    MONOCYTES 8.60 02/21/2024 09:16 AM    EOSINOPHILS 3.20 02/21/2024 09:16 AM    BASOPHILS 0.60 02/21/2024 09:16 AM    IMMGRAN 0.30 02/21/2024 09:16 AM    IMMGRAN 0 11/27/2013 10:14 AM    NRBC 0.00 02/21/2024 09:16 AM    NEUTS 4.33 02/21/2024 09:16 AM    NEUTS 2.5 11/27/2013 10:14 AM    LYMPHS 2.01 02/21/2024 09:16 AM    LYMPHS 1.9 11/27/2013 10:14 AM    MONOS 0.62 02/21/2024 " "09:16 AM    MONOS 0.5 11/27/2013 10:14 AM    EOS 0.23 02/21/2024 09:16 AM    EOS 0.1 11/27/2013 10:14 AM    BASO 0.04 02/21/2024 09:16 AM    BASO 0.0 11/27/2013 10:14 AM    IMMGRANAB 0.02 02/21/2024 09:16 AM    IMMGRANAB 0.0 11/27/2013 10:14 AM    NRBCAB 0.00 02/21/2024 09:16 AM        CBC w/o DIFF  Lab Results   Component Value Date/Time    WBC 7.3 02/21/2024 09:16 AM    WBC 5.1 10/19/2012 09:42 AM    RBC 5.04 02/21/2024 09:16 AM    RBC 4.66 10/19/2012 09:42 AM    HEMOGLOBIN 15.8 02/21/2024 09:16 AM    MCV 92.3 02/21/2024 09:16 AM    MCV 94 10/19/2012 09:42 AM    MCH 31.3 02/21/2024 09:16 AM    MCH 32.2 10/19/2012 09:42 AM    MCHC 34.0 02/21/2024 09:16 AM    RDW 42.8 02/21/2024 09:16 AM    RDW 13.7 10/19/2012 09:42 AM    MPV 10.3 02/21/2024 09:16 AM       LIVER:  Lab Results   Component Value Date/Time    ALKPHOSPHAT 73 02/21/2024 09:16 AM    ASTSGOT 28 02/21/2024 09:16 AM    ALTSGPT 30 02/21/2024 09:16 AM    TBILIRUBIN 0.6 02/21/2024 09:16 AM       BNP:  No results found for: \"BNPBTYPENAT\"    PT/INR:  No results found for: \"PROTHROMBTM\", \"INR\"          Impression/Plan:  Paroxysmal atrial fibrillation (HCC)   - continue Eliquis for stroke protection   - continue metoprolol for rate control   - plan for ablation   - procedure, recovery and risks explained in detail    Hypertension   - well controlled today   - continue current medication regimen        Lifestyle Modification for better heart health care as well as beneficial for prevention of worsening the atrial arrhythmia progression. Lifestyle modification discussed includes diet and reduction of sodium. Patients should eat more of a Mediterranean diet and be very careful with how much processed and fast food they eat. Excessive sodium intake can result in fluid retention which can add additional strain to patients cardiac electrical system. Weight loss can also help long term with cardiac health. For patients with BMI above 25kg/m2, studies have shown a greater " chance of progression of disease as well as recurrence after interventions. ARREST-AF study showed less recurrence of AF and slower disease progression in patients with BMI below 27kg/m2. Smoking and vaping should be stopped. Moderation on caffeine and alcohol. Excessive alcohol or caffeine can result in reactions and antagonize the hearts electrical system. Patient that fit the matrix for sleep apnea should be referred for a formal study and should try to be compliant with treatment for sleep apnea. Stay hydrated and stay active. Studies have shown that staying physically active can help heart health. The CARDIO-FIT study showed sedentary individuals lead to faster time of recurrence of arrhythmia. Exercise goals should be trying to maintain 120-200 minutes per week of exercise.      A total of 38 minutes of time was spent on day of encounter reviewing medical record, performing history and examination, counseling, ordering medication/test/consults, collaborating with referring service, and documentation.    Alex Avitia AGACNP-EP  Cardiac Electrophysiology

## 2024-05-19 ASSESSMENT — ENCOUNTER SYMPTOMS
CONSTITUTIONAL NEGATIVE: 1
RESPIRATORY NEGATIVE: 1
SHORTNESS OF BREATH: 0
BRUISES/BLEEDS EASILY: 0
DEPRESSION: 0
CLAUDICATION: 0
GASTROINTESTINAL NEGATIVE: 1
HALLUCINATIONS: 0
SENSORY CHANGE: 0
MUSCULOSKELETAL NEGATIVE: 1
NERVOUS/ANXIOUS: 0
DIZZINESS: 0
ORTHOPNEA: 0
NEUROLOGICAL NEGATIVE: 1
WHEEZING: 0
PND: 0
PALPITATIONS: 0
NAUSEA: 0
EYES NEGATIVE: 1

## 2024-05-19 NOTE — ASSESSMENT & PLAN NOTE
- continue Eliquis for stroke protection   - continue metoprolol for rate control   - plan for ablation   - procedure, recovery and risks explained in detail

## 2024-05-20 ENCOUNTER — PHARMACY VISIT (OUTPATIENT)
Dept: PHARMACY | Facility: MEDICAL CENTER | Age: 69
End: 2024-05-20
Payer: COMMERCIAL

## 2024-05-20 ENCOUNTER — TELEPHONE (OUTPATIENT)
Dept: CARDIOLOGY | Facility: MEDICAL CENTER | Age: 69
End: 2024-05-20
Payer: MEDICARE

## 2024-05-20 NOTE — TELEPHONE ENCOUNTER
Dr. Alvarez,     MT ordered an afib ablation on this pateint. Am I ok to proceed with scheduling? If so, are there any medications you would like this patient to hold for this procedure?     Thank You,  Lynne

## 2024-05-20 NOTE — TELEPHONE ENCOUNTER
----- Message from DEEJAY Ricketts sent at 5/17/2024  4:51 PM PDT -----  Regarding: ablation  Case discussed and reviewed by Dr. Kim chavarria placed

## 2024-08-12 PROCEDURE — RXMED WILLOW AMBULATORY MEDICATION CHARGE: Performed by: INTERNAL MEDICINE

## 2024-08-12 PROCEDURE — RXMED WILLOW AMBULATORY MEDICATION CHARGE: Performed by: NURSE PRACTITIONER

## 2024-08-20 ENCOUNTER — APPOINTMENT (OUTPATIENT)
Dept: ADMISSIONS | Facility: MEDICAL CENTER | Age: 69
End: 2024-08-20
Attending: INTERNAL MEDICINE
Payer: MEDICARE

## 2024-08-22 ENCOUNTER — PRE-ADMISSION TESTING (OUTPATIENT)
Dept: ADMISSIONS | Facility: MEDICAL CENTER | Age: 69
End: 2024-08-22
Attending: INTERNAL MEDICINE
Payer: MEDICARE

## 2024-08-29 ENCOUNTER — PHARMACY VISIT (OUTPATIENT)
Dept: PHARMACY | Facility: MEDICAL CENTER | Age: 69
End: 2024-08-29
Payer: COMMERCIAL

## 2024-09-03 ENCOUNTER — TELEPHONE (OUTPATIENT)
Dept: CARDIOLOGY | Facility: MEDICAL CENTER | Age: 69
End: 2024-09-03
Payer: MEDICARE

## 2024-09-09 ENCOUNTER — TELEPHONE (OUTPATIENT)
Dept: CARDIOLOGY | Facility: MEDICAL CENTER | Age: 69
End: 2024-09-09
Payer: MEDICARE

## 2024-09-09 NOTE — TELEPHONE ENCOUNTER
Gabby Cuba,    This patient sent me an email wanting to confirm the specific instructions for his medications pre-procedure. His ablasion is scheduled for 09/17/24 with SS, and he wants to make sure he stops the required medications (if any) prior to the procedure to avoid any possible delay in his procedure.     Can you please reach out to the patient to discuss his specific instructions regarding his medications? He attached a copy of his active medications, and I have attached them to this encounter for you.    Please let me know if you have any questions, or if I can assist in any way going forward.    Thank you!    Milly DUMONT  Rx Coordinator

## 2024-09-10 ENCOUNTER — APPOINTMENT (OUTPATIENT)
Dept: CARDIOLOGY | Facility: MEDICAL CENTER | Age: 69
End: 2024-09-10
Attending: INTERNAL MEDICINE
Payer: MEDICARE

## 2024-09-13 ENCOUNTER — PRE-ADMISSION TESTING (OUTPATIENT)
Dept: ADMISSIONS | Facility: MEDICAL CENTER | Age: 69
End: 2024-09-13
Attending: NURSE PRACTITIONER
Payer: MEDICARE

## 2024-09-13 DIAGNOSIS — Z01.812 PRE-OPERATIVE LABORATORY EXAMINATION: ICD-10-CM

## 2024-09-13 DIAGNOSIS — Z01.810 PRE-OPERATIVE CARDIOVASCULAR EXAMINATION: ICD-10-CM

## 2024-09-13 LAB
ALBUMIN SERPL BCP-MCNC: 4.1 G/DL (ref 3.2–4.9)
ALBUMIN/GLOB SERPL: 1.4 G/DL
ALP SERPL-CCNC: 76 U/L (ref 30–99)
ALT SERPL-CCNC: 26 U/L (ref 2–50)
ANION GAP SERPL CALC-SCNC: 11 MMOL/L (ref 7–16)
AST SERPL-CCNC: 27 U/L (ref 12–45)
BASOPHILS # BLD AUTO: 0.7 % (ref 0–1.8)
BASOPHILS # BLD: 0.06 K/UL (ref 0–0.12)
BILIRUB SERPL-MCNC: 0.6 MG/DL (ref 0.1–1.5)
BUN SERPL-MCNC: 19 MG/DL (ref 8–22)
CALCIUM ALBUM COR SERPL-MCNC: 9 MG/DL (ref 8.5–10.5)
CALCIUM SERPL-MCNC: 9.1 MG/DL (ref 8.5–10.5)
CHLORIDE SERPL-SCNC: 105 MMOL/L (ref 96–112)
CO2 SERPL-SCNC: 24 MMOL/L (ref 20–33)
CREAT SERPL-MCNC: 1.07 MG/DL (ref 0.5–1.4)
EKG IMPRESSION: NORMAL
EOSINOPHIL # BLD AUTO: 0.13 K/UL (ref 0–0.51)
EOSINOPHIL NFR BLD: 1.6 % (ref 0–6.9)
ERYTHROCYTE [DISTWIDTH] IN BLOOD BY AUTOMATED COUNT: 43.9 FL (ref 35.9–50)
GFR SERPLBLD CREATININE-BSD FMLA CKD-EPI: 75 ML/MIN/1.73 M 2
GLOBULIN SER CALC-MCNC: 3 G/DL (ref 1.9–3.5)
GLUCOSE SERPL-MCNC: 107 MG/DL (ref 65–99)
HCT VFR BLD AUTO: 44.5 % (ref 42–52)
HGB BLD-MCNC: 14.8 G/DL (ref 14–18)
IMM GRANULOCYTES # BLD AUTO: 0.06 K/UL (ref 0–0.11)
IMM GRANULOCYTES NFR BLD AUTO: 0.7 % (ref 0–0.9)
INR PPP: 1.01 (ref 0.87–1.13)
LYMPHOCYTES # BLD AUTO: 2.11 K/UL (ref 1–4.8)
LYMPHOCYTES NFR BLD: 26 % (ref 22–41)
MCH RBC QN AUTO: 30.8 PG (ref 27–33)
MCHC RBC AUTO-ENTMCNC: 33.3 G/DL (ref 32.3–36.5)
MCV RBC AUTO: 92.5 FL (ref 81.4–97.8)
MONOCYTES # BLD AUTO: 0.52 K/UL (ref 0–0.85)
MONOCYTES NFR BLD AUTO: 6.4 % (ref 0–13.4)
NEUTROPHILS # BLD AUTO: 5.24 K/UL (ref 1.82–7.42)
NEUTROPHILS NFR BLD: 64.6 % (ref 44–72)
NRBC # BLD AUTO: 0 K/UL
NRBC BLD-RTO: 0 /100 WBC (ref 0–0.2)
PLATELET # BLD AUTO: 208 K/UL (ref 164–446)
PMV BLD AUTO: 10.3 FL (ref 9–12.9)
POTASSIUM SERPL-SCNC: 4.4 MMOL/L (ref 3.6–5.5)
PROT SERPL-MCNC: 7.1 G/DL (ref 6–8.2)
PROTHROMBIN TIME: 13.4 SEC (ref 12–14.6)
RBC # BLD AUTO: 4.81 M/UL (ref 4.7–6.1)
SODIUM SERPL-SCNC: 140 MMOL/L (ref 135–145)
WBC # BLD AUTO: 8.1 K/UL (ref 4.8–10.8)

## 2024-09-13 PROCEDURE — 85610 PROTHROMBIN TIME: CPT

## 2024-09-13 PROCEDURE — 80053 COMPREHEN METABOLIC PANEL: CPT

## 2024-09-13 PROCEDURE — 93010 ELECTROCARDIOGRAM REPORT: CPT | Performed by: INTERNAL MEDICINE

## 2024-09-13 PROCEDURE — 36415 COLL VENOUS BLD VENIPUNCTURE: CPT

## 2024-09-13 PROCEDURE — 85025 COMPLETE CBC W/AUTO DIFF WBC: CPT

## 2024-09-13 PROCEDURE — 93005 ELECTROCARDIOGRAM TRACING: CPT

## 2024-09-17 ENCOUNTER — ANESTHESIA (OUTPATIENT)
Dept: CARDIOLOGY | Facility: MEDICAL CENTER | Age: 69
End: 2024-09-17
Payer: MEDICARE

## 2024-09-17 ENCOUNTER — APPOINTMENT (OUTPATIENT)
Dept: CARDIOLOGY | Facility: MEDICAL CENTER | Age: 69
End: 2024-09-17
Attending: NURSE PRACTITIONER
Payer: MEDICARE

## 2024-09-17 ENCOUNTER — HOSPITAL ENCOUNTER (OUTPATIENT)
Facility: MEDICAL CENTER | Age: 69
End: 2024-09-17
Attending: INTERNAL MEDICINE | Admitting: INTERNAL MEDICINE
Payer: MEDICARE

## 2024-09-17 ENCOUNTER — ANESTHESIA EVENT (OUTPATIENT)
Dept: CARDIOLOGY | Facility: MEDICAL CENTER | Age: 69
End: 2024-09-17
Payer: MEDICARE

## 2024-09-17 VITALS
TEMPERATURE: 96.8 F | OXYGEN SATURATION: 95 % | WEIGHT: 260.14 LBS | HEART RATE: 82 BPM | BODY MASS INDEX: 35.24 KG/M2 | SYSTOLIC BLOOD PRESSURE: 174 MMHG | HEIGHT: 72 IN | DIASTOLIC BLOOD PRESSURE: 97 MMHG | RESPIRATION RATE: 18 BRPM

## 2024-09-17 DIAGNOSIS — I48.0 PAROXYSMAL ATRIAL FIBRILLATION (HCC): ICD-10-CM

## 2024-09-17 LAB
ACT BLD: 287 SEC (ref 74–137)
ACT BLD: 317 SEC (ref 74–137)
EKG IMPRESSION: NORMAL
LV EJECT FRACT  99904: 55

## 2024-09-17 PROCEDURE — 93010 ELECTROCARDIOGRAM REPORT: CPT | Mod: 59 | Performed by: INTERNAL MEDICINE

## 2024-09-17 PROCEDURE — 93005 ELECTROCARDIOGRAM TRACING: CPT | Mod: XE | Performed by: INTERNAL MEDICINE

## 2024-09-17 PROCEDURE — 93312 ECHO TRANSESOPHAGEAL: CPT

## 2024-09-17 PROCEDURE — C1894 INTRO/SHEATH, NON-LASER: HCPCS

## 2024-09-17 PROCEDURE — 160047 HCHG PACU  - EA ADDL 30 MINS PHASE II

## 2024-09-17 PROCEDURE — 700101 HCHG RX REV CODE 250

## 2024-09-17 PROCEDURE — 700111 HCHG RX REV CODE 636 W/ 250 OVERRIDE (IP)

## 2024-09-17 PROCEDURE — 160035 HCHG PACU - 1ST 60 MINS PHASE I

## 2024-09-17 PROCEDURE — 700101 HCHG RX REV CODE 250: Performed by: ANESTHESIOLOGY

## 2024-09-17 PROCEDURE — 160002 HCHG RECOVERY MINUTES (STAT)

## 2024-09-17 PROCEDURE — 93656 COMPRE EP EVAL ABLTJ ATR FIB: CPT | Performed by: INTERNAL MEDICINE

## 2024-09-17 PROCEDURE — 160046 HCHG PACU - 1ST 60 MINS PHASE II

## 2024-09-17 PROCEDURE — 700111 HCHG RX REV CODE 636 W/ 250 OVERRIDE (IP): Performed by: ANESTHESIOLOGY

## 2024-09-17 PROCEDURE — 700105 HCHG RX REV CODE 258: Performed by: INTERNAL MEDICINE

## 2024-09-17 PROCEDURE — 85347 COAGULATION TIME ACTIVATED: CPT | Mod: 91

## 2024-09-17 PROCEDURE — 93657 TX L/R ATRIAL FIB ADDL: CPT | Performed by: INTERNAL MEDICINE

## 2024-09-17 RX ORDER — LIDOCAINE HYDROCHLORIDE 20 MG/ML
INJECTION, SOLUTION INFILTRATION; PERINEURAL
Status: COMPLETED
Start: 2024-09-17 | End: 2024-09-17

## 2024-09-17 RX ORDER — ONDANSETRON 2 MG/ML
4 INJECTION INTRAMUSCULAR; INTRAVENOUS
Status: DISCONTINUED | OUTPATIENT
Start: 2024-09-17 | End: 2024-09-17 | Stop reason: HOSPADM

## 2024-09-17 RX ORDER — BUPIVACAINE HYDROCHLORIDE 5 MG/ML
INJECTION, SOLUTION EPIDURAL; INTRACAUDAL
Status: COMPLETED
Start: 2024-09-17 | End: 2024-09-17

## 2024-09-17 RX ORDER — HYDROMORPHONE HYDROCHLORIDE 1 MG/ML
0.1 INJECTION, SOLUTION INTRAMUSCULAR; INTRAVENOUS; SUBCUTANEOUS
Status: DISCONTINUED | OUTPATIENT
Start: 2024-09-17 | End: 2024-09-17 | Stop reason: HOSPADM

## 2024-09-17 RX ORDER — OXYCODONE HCL 5 MG/5 ML
10 SOLUTION, ORAL ORAL
Status: DISCONTINUED | OUTPATIENT
Start: 2024-09-17 | End: 2024-09-17 | Stop reason: HOSPADM

## 2024-09-17 RX ORDER — HYDROMORPHONE HYDROCHLORIDE 1 MG/ML
0.2 INJECTION, SOLUTION INTRAMUSCULAR; INTRAVENOUS; SUBCUTANEOUS
Status: DISCONTINUED | OUTPATIENT
Start: 2024-09-17 | End: 2024-09-17 | Stop reason: HOSPADM

## 2024-09-17 RX ORDER — LIDOCAINE HYDROCHLORIDE 40 MG/ML
SOLUTION TOPICAL
Status: COMPLETED
Start: 2024-09-17 | End: 2024-09-17

## 2024-09-17 RX ORDER — ONDANSETRON 2 MG/ML
4 INJECTION INTRAMUSCULAR; INTRAVENOUS EVERY 6 HOURS PRN
Status: DISCONTINUED | OUTPATIENT
Start: 2024-09-17 | End: 2024-09-17 | Stop reason: HOSPADM

## 2024-09-17 RX ORDER — LIDOCAINE HYDROCHLORIDE 40 MG/ML
SOLUTION TOPICAL PRN
Status: DISCONTINUED | OUTPATIENT
Start: 2024-09-17 | End: 2024-09-17 | Stop reason: SURG

## 2024-09-17 RX ORDER — SODIUM CHLORIDE, SODIUM LACTATE, POTASSIUM CHLORIDE, CALCIUM CHLORIDE 600; 310; 30; 20 MG/100ML; MG/100ML; MG/100ML; MG/100ML
INJECTION, SOLUTION INTRAVENOUS CONTINUOUS
Status: DISCONTINUED | OUTPATIENT
Start: 2024-09-17 | End: 2024-09-17 | Stop reason: HOSPADM

## 2024-09-17 RX ORDER — ONDANSETRON 2 MG/ML
INJECTION INTRAMUSCULAR; INTRAVENOUS PRN
Status: DISCONTINUED | OUTPATIENT
Start: 2024-09-17 | End: 2024-09-17 | Stop reason: SURG

## 2024-09-17 RX ORDER — SODIUM CHLORIDE, SODIUM LACTATE, POTASSIUM CHLORIDE, CALCIUM CHLORIDE 600; 310; 30; 20 MG/100ML; MG/100ML; MG/100ML; MG/100ML
INJECTION, SOLUTION INTRAVENOUS CONTINUOUS
Status: ACTIVE | OUTPATIENT
Start: 2024-09-17 | End: 2024-09-17

## 2024-09-17 RX ORDER — HYDROMORPHONE HYDROCHLORIDE 1 MG/ML
0.4 INJECTION, SOLUTION INTRAMUSCULAR; INTRAVENOUS; SUBCUTANEOUS
Status: DISCONTINUED | OUTPATIENT
Start: 2024-09-17 | End: 2024-09-17 | Stop reason: HOSPADM

## 2024-09-17 RX ORDER — DEXAMETHASONE SODIUM PHOSPHATE 4 MG/ML
INJECTION, SOLUTION INTRA-ARTICULAR; INTRALESIONAL; INTRAMUSCULAR; INTRAVENOUS; SOFT TISSUE PRN
Status: DISCONTINUED | OUTPATIENT
Start: 2024-09-17 | End: 2024-09-17 | Stop reason: SURG

## 2024-09-17 RX ORDER — ALBUTEROL SULFATE 5 MG/ML
2.5 SOLUTION RESPIRATORY (INHALATION)
Status: DISCONTINUED | OUTPATIENT
Start: 2024-09-17 | End: 2024-09-17 | Stop reason: HOSPADM

## 2024-09-17 RX ORDER — DIPHENHYDRAMINE HYDROCHLORIDE 50 MG/ML
12.5 INJECTION INTRAMUSCULAR; INTRAVENOUS
Status: DISCONTINUED | OUTPATIENT
Start: 2024-09-17 | End: 2024-09-17 | Stop reason: HOSPADM

## 2024-09-17 RX ORDER — OXYCODONE HCL 5 MG/5 ML
5 SOLUTION, ORAL ORAL
Status: DISCONTINUED | OUTPATIENT
Start: 2024-09-17 | End: 2024-09-17 | Stop reason: HOSPADM

## 2024-09-17 RX ORDER — HEPARIN SODIUM 200 [USP'U]/100ML
INJECTION, SOLUTION INTRAVENOUS
Status: COMPLETED
Start: 2024-09-17 | End: 2024-09-17

## 2024-09-17 RX ORDER — ACETAMINOPHEN 325 MG/1
650 TABLET ORAL EVERY 4 HOURS PRN
Status: DISCONTINUED | OUTPATIENT
Start: 2024-09-17 | End: 2024-09-17 | Stop reason: HOSPADM

## 2024-09-17 RX ORDER — HEPARIN SODIUM 1000 [USP'U]/ML
INJECTION, SOLUTION INTRAVENOUS; SUBCUTANEOUS
Status: COMPLETED
Start: 2024-09-17 | End: 2024-09-17

## 2024-09-17 RX ORDER — HALOPERIDOL 5 MG/ML
1 INJECTION INTRAMUSCULAR
Status: DISCONTINUED | OUTPATIENT
Start: 2024-09-17 | End: 2024-09-17 | Stop reason: HOSPADM

## 2024-09-17 RX ORDER — MEPERIDINE HYDROCHLORIDE 25 MG/ML
6.25 INJECTION INTRAMUSCULAR; INTRAVENOUS; SUBCUTANEOUS
Status: DISCONTINUED | OUTPATIENT
Start: 2024-09-17 | End: 2024-09-17 | Stop reason: HOSPADM

## 2024-09-17 RX ORDER — PROTAMINE SULFATE 10 MG/ML
INJECTION, SOLUTION INTRAVENOUS
Status: COMPLETED
Start: 2024-09-17 | End: 2024-09-17

## 2024-09-17 RX ADMIN — PROPOFOL 200 MG: 10 INJECTION, EMULSION INTRAVENOUS at 09:52

## 2024-09-17 RX ADMIN — ROCURONIUM BROMIDE 20 MG: 10 INJECTION, SOLUTION INTRAVENOUS at 10:24

## 2024-09-17 RX ADMIN — SODIUM CHLORIDE, POTASSIUM CHLORIDE, SODIUM LACTATE AND CALCIUM CHLORIDE: 600; 310; 30; 20 INJECTION, SOLUTION INTRAVENOUS at 09:46

## 2024-09-17 RX ADMIN — HEPARIN SODIUM 2000 UNITS: 200 INJECTION, SOLUTION INTRAVENOUS at 09:58

## 2024-09-17 RX ADMIN — DEXAMETHASONE SODIUM PHOSPHATE 4 MG: 4 INJECTION INTRA-ARTICULAR; INTRALESIONAL; INTRAMUSCULAR; INTRAVENOUS; SOFT TISSUE at 09:52

## 2024-09-17 RX ADMIN — HEPARIN SODIUM: 1000 INJECTION, SOLUTION INTRAVENOUS; SUBCUTANEOUS at 09:58

## 2024-09-17 RX ADMIN — LIDOCAINE HYDROCHLORIDE 4 ML: 40 SOLUTION TOPICAL at 09:54

## 2024-09-17 RX ADMIN — SUGAMMADEX 200 MG: 100 INJECTION, SOLUTION INTRAVENOUS at 11:18

## 2024-09-17 RX ADMIN — ROCURONIUM BROMIDE 80 MG: 10 INJECTION, SOLUTION INTRAVENOUS at 09:52

## 2024-09-17 RX ADMIN — ONDANSETRON 4 MG: 2 INJECTION INTRAMUSCULAR; INTRAVENOUS at 09:52

## 2024-09-17 RX ADMIN — PROTAMINE SULFATE 40 MG: 10 INJECTION, SOLUTION INTRAVENOUS at 11:11

## 2024-09-17 RX ADMIN — LIDOCAINE HYDROCHLORIDE: 20 INJECTION, SOLUTION INFILTRATION; PERINEURAL at 09:57

## 2024-09-17 RX ADMIN — BUPIVACAINE HYDROCHLORIDE: 5 INJECTION, SOLUTION EPIDURAL; INTRACAUDAL at 09:57

## 2024-09-17 RX ADMIN — GLYCOPYRROLATE 0.3 MG: 0.2 INJECTION INTRAMUSCULAR; INTRAVENOUS at 10:27

## 2024-09-17 RX ADMIN — ROCURONIUM BROMIDE 30 MG: 10 INJECTION, SOLUTION INTRAVENOUS at 10:40

## 2024-09-17 ASSESSMENT — FIBROSIS 4 INDEX: FIB4 SCORE: 1.73

## 2024-09-17 NOTE — DISCHARGE INSTRUCTIONS
[facility] Post Ablation Patient Instructions:  No lifting > 10 lbs x 1 week.      No soaking in baths, hot tubs, pools x 1 week.  May shower the day after discharge and take off groin dressings and leave  sites uncovered.  Continue to monitor sites daily for warmth, redness, discolored drainage.  It is common to have a small lump in the area where the cather was (usually the size of a marble); this will go away but takes approximately 6 weeks to normalize.     3.   Please take all medications as prescribed to you; please do not stop any medications prescribed post ablation unless directed by your healthcare provider.      4.   Please do not miss any doses of your blood thinner (if you have been started on, or take chronic blood thinners) without discussion with your healthcare provider first.     5.   Please walk and take deep breaths after discharge.  After discharge, if you experience neurological changes/signs of stroke or high fever you should be seen in the emergency dept.     6.   It is possible you may experience some chest discomfort or chest tightness post ablation.  This is usually secondary to inflammation and irritation of the tissues at the area of the ablation.  If this occurs, it is advised to try 400 mg of Ibuprofen with food as needed up to three times a day for a maximum of two days.  This should help to decrease pain and tissue inflammation.          **Please notify the office ([phone]) if this occurs.         ** DO NOT TAKE Ibuprofen IF HISTORY OF ALLERGY, SIGNIFICANT BLEEDING OR KIDNEY DISEASE WITHOUT DISCUSSING WITH YOUR CARDIOLOGY PROVIDER FIRST.          ** If pain becomes severe or you have additional symptoms you may need to be medically evaluated; please contact the cardiology office ([phone]) for further direction.     7. It is possible that you may experience arrhythmia/Atrial Fibrillation post ablation.  This is secondary to irritation and  inflammation of the cardiac tissues from the ablation.  If you have atrial fibrillation all day or feel poorly with it, please notify your cardiologist's via phone ([phone]) or mychart.      8.  Please contact call our office ([phone]) or message via [employer] message if you have any questions or concerns post procedurally.    9. You need to be seen for post ablation follow up 3-4 weeks post procedure. An appointment is scheduled for you.  Please contact the office ([phone]) if you need to change your appointment.

## 2024-09-17 NOTE — PROGRESS NOTES
1122 Pt arrived from cath lab awake alert and oriented x 4 with on c/o pain or nausea. Right groin site assessed with circulating RN. Site CDI with gauze and tegaderm. No signs of bleeding or hematoma. Pt placed on monitor. BP elevated SBP 160s O2 saturation 96 % 10 LPM mask. Report received from RN and MD. WHO sheet signed. .    1130 Wife notified of patients arrival and status.    1200 Pt able to tolerate PO fluids without difficulty.     1355 APRN to bedside.     1427 Pt ambulated and dressing replaced. Scant sanginous drainage on old dressing. Pt instructed to lay back down and will reassess for bleeding.

## 2024-09-17 NOTE — ANESTHESIA PROCEDURE NOTES
Airway    Date/Time: 9/17/2024 9:54 AM    Performed by: Boyd Richards M.D.  Authorized by: Boyd Richards M.D.    Location:  OR  Urgency:  Elective  Difficult Airway: No    Indications for Airway Management:  Anesthesia      Spontaneous Ventilation: absent    Sedation Level:  Deep  Preoxygenated: Yes    Patient Position:  Sniffing  Mask Difficulty Assessment:  0 - not attempted  Final Airway Type:  Endotracheal airway  Final Endotracheal Airway:  ETT  Cuffed: Yes    Technique Used for Successful ETT Placement:  Direct laryngoscopy    Insertion Site:  Oral  Blade Type:  Shirlene  Laryngoscope Blade/Videolaryngoscope Blade Size:  3  ETT Size (mm):  7.0  Measured from:  Teeth  ETT to Teeth (cm):  22  Placement Verified by: auscultation and capnometry    Cormack-Lehane Classification:  Grade I - full view of glottis  Number of Attempts at Approach:  1

## 2024-09-17 NOTE — PROGRESS NOTES
1335- bed rest completed per orders. Right groin site CDI and soft. Patient ambulated with steady gait. Right groin site CDI and soft post ambulation. No bleeding or signs of hematoma observed post ambulation

## 2024-09-17 NOTE — ANESTHESIA PREPROCEDURE EVALUATION
Anesthesia Start Date/Time: 09/17/24 0946    Scheduled providers: Jesenia Alvarez M.D.; Boyd Richards M.D.    Procedure: CL-EP ABLATION ATRIAL FIBRILLATION    Diagnosis: Paroxysmal atrial fibrillation (HCC) [I48.0]    Indications: See Associated Dx    Location: [facility] - Cath Lab - [facility]            Relevant Problems   NEURO   (positive) Headache      CARDIAC   (positive) Hypertension   (positive) Paroxysmal atrial fibrillation (HCC)   (positive) Undiagnosed cardiac murmurs      GI   (positive) GERD (gastroesophageal reflux disease)         (positive) Renal cyst      ENDO   (positive) Hypothyroid     BP (!) 176/99   Pulse 82   Temp 36.7 °C (98.1 °F) (Temporal)   Resp 18   Ht 1.829 m (6')   Wt 118 kg (260 lb 2.3 oz)   SpO2 99%   BMI 35.28 kg/m²     Physical Exam    Airway   Mallampati: II  TM distance: >3 FB  Neck ROM: full       Cardiovascular - normal exam  Rhythm: regular  Rate: normal  (-) murmur     Dental - normal exam           Pulmonary - normal exam  Breath sounds clear to auscultation     Abdominal    Neurological - normal exam                   Anesthesia Plan    ASA 3       Plan - general       Airway plan will be ETT  NEGRITO Planned        Induction: intravenous    Postoperative Plan: Postoperative administration of opioids is intended.    Pertinent diagnostic labs and testing reviewed    Informed Consent:    Anesthetic plan and risks discussed with patient.    Use of blood products discussed with: patient whom consented to blood products.

## 2024-09-17 NOTE — OP REPORT
[facility]  Electrophysiology Procedure Note    Procedure(s) Performed:   1) Atrial fibrillation ablation  2) Ablation of additional atrial fibrillation mechanism    Indication(s):  Paroxysmal atrial fibrillation    Physician(s): Jesenia Alvarez M.D.     Resident/Assistant(s): None     Anesthesia: General endotracheal anesthetic.     Specimen(s) Removed: None     Estimated Blood Loss:  30cc     Complications:  None     Description of Procedure:   After informed written consent, the patient was brought to the EP lab in the fasting, non-sedated state. The patient was prepped and draped in the usual sterile fashion. Femoral venous access was obtained using the modified Seldinger technique. In the right femoral vein, 3 sheaths (8,8,7 Fr) were inserted over 0.035” guidewires. Perclose was pre-deployed on an 8 Fr access. A deflectable decapolar catheter was advanced to the CS position. Baseline rhythm sinus,  ms. An intracardiac echo (ICE) catheter was advanced to the right atrium. ICE was used to identify the atrial septum, left atrial appendage, and pulmonary veins and wendi the anatomic structures to superimpose on our 3D electroanatomic map using CARTOSound. During the procedure, ICE was utilized to localize the ablation catheter, monitor for thrombus formation, and to exclude pericardial effusion. Intravenous heparin was administered to maintain -350 seconds. Transseptal left heart catheterization was performed under intracardiac echo and hemodynamic guidance using a SkyeTek system. Mapping of the pulmonary veins and left atrium was performed using CARTO3 FAM and a deflectable multipolar mapping catheter (BiosPiehole OctaRay). IV Gylcopyrrolate was given as a vagolytic. We exchanged for a Faradrive sheath. A Farawave catheter was advanced to the LA. Eight applications per PV divided between basket and flower configurations were delivered to isolate the PVs. Additional applications in the flower  configuration were delivered along the LA roof and floor to isolate the LA posterior wall as additional substrate of AF. Repeat voltage map was performed demonstrating successful PW and R sided PV isolation. We noted independent firing initiated within L sided PV vivienne. Pacing here in the LA vivienne demonstrated global capture consistent with lack of exit block. The Farawave catheter was advanced back into the L sided vivienne in smaller basket configuration and additional lesions were delivered within the PV. Repeat mapping showed lack of any signal or pace capture within the L sided PVs. Burst pacing failed to induce arrhythmia. At the end of the procedure, heparin was reversed with protamine, the catheter and sheaths were removed, and hemostasis was achieved by Vascade MVP for smaller access and Perclose for the Faradrive access. Following recovery from anesthesia, the patient was transferred to the PPU in good condition.       Total ablation time: 62 applications of Farapulse    Fluoroscopy time: 13.5 minutes     Electrophysiologic Findings:    1. Sinus  660 ms, HV 58 ms. AVBCL = 280 ms.    Impressions:    1. Paroxysmal atrial fibrillation.    2. Successful PFA pulmonary vein isolation and posterior wall isolation procedure.       Recommendations:  1. Resume anticoagulation.  2. Transfer to monitored bedrest.

## 2024-09-17 NOTE — ANESTHESIA TIME REPORT
Anesthesia Start and Stop Event Times       Date Time Event    9/17/2024 0945 Ready for Procedure     0946 Anesthesia Start     1123 Anesthesia Stop          Responsible Staff  09/17/24      Name Role Begin End    Boyd Richards M.D. Anesth 0946 1123          Overtime Reason:  no overtime (within assigned shift)    Comments:

## 2024-09-17 NOTE — ANESTHESIA POSTPROCEDURE EVALUATION
Patient: Luan Schmidt    Procedure Summary       Date: 09/17/24 Room / Location: [facility] Cath [facility]    Anesthesia Start: 0946 Anesthesia Stop: 1123    Procedure: CL-EP ABLATION ATRIAL FIBRILLATION Diagnosis:       Paroxysmal atrial fibrillation (HCC)      (See Associated Dx)    Scheduled Providers: Jesenia Alvarez M.D.; Boyd Richards M.D. Responsible Provider: Boyd Richards M.D.    Anesthesia Type: general ASA Status: 3            Final Anesthesia Type: general  Last vitals  BP   Blood Pressure : (!) 161/90    Temp   36.2 °C (97.1 °F)    Pulse   74   Resp   16    SpO2   90 %      Anesthesia Post Evaluation    Patient location during evaluation: PACU  Patient participation: complete - patient participated  Level of consciousness: awake and alert    Airway patency: patent  Anesthetic complications: no  Cardiovascular status: hemodynamically stable  Respiratory status: face mask    PONV: none          No notable events documented.     Nurse Pain Score: 0 (NPRS)

## 2024-09-17 NOTE — H&P
EP Pre-procedure H and P    Date of service: 9/17/2024    Reason for visit/Chief complaint: PAF    HPI:   Pt is a 70 yo M. History of PAF. Here for AF ablation.    Past Medical History:   Diagnosis Date    Arrhythmia     Arthritis     Disorder of thyroid     Dyslipidemia 08/23/2009    Heart burn     High cholesterol     Hypertension     Renal cyst 10/29/2015     Past Surgical History:   Procedure Laterality Date    APPENDECTOMY      OTHER  1966    Appendicitis     Family History   Problem Relation Age of Onset    Heart Disease Father        Physical Exam:  Vitals:    09/17/24 0858   BP: (!) 176/99   Pulse: 82   Resp: 18   Temp: 36.7 °C (98.1 °F)   TempSrc: Temporal   SpO2: 99%   Weight: 118 kg (260 lb 2.3 oz)   Height: 1.829 m (6')     Gen: NAD, conversant  HEENT: PERRL, EOMI  LUNGS: CTA B, no w/r/r  CV: irregular, no m/r/g, no JVD  Abd: Soft, NT/ND, +BS  Ext: no edema, warm and well perfused    Labs reviewed    EKG interpreted by me:  AF    Impression/Recs:  1. Paroxysmal atrial fibrillation (HCC)  EC-NEGRITO W/O CONT    EC-NEGRITO W/O CONT    CL-EP ABLATION ATRIAL FIBRILLATION    CL-EP ABLATION ATRIAL FIBRILLATION        -Risks/benefits discussed  -All questions answered  -Proceed with AF ablation    Jesenia Alvarez MD

## 2024-09-23 ENCOUNTER — PHARMACY VISIT (OUTPATIENT)
Dept: PHARMACY | Facility: MEDICAL CENTER | Age: 69
End: 2024-09-23
Payer: COMMERCIAL

## 2024-09-23 PROCEDURE — RXMED WILLOW AMBULATORY MEDICATION CHARGE: Performed by: NURSE PRACTITIONER

## 2024-10-11 ENCOUNTER — OFFICE VISIT (OUTPATIENT)
Dept: CARDIOLOGY | Facility: MEDICAL CENTER | Age: 69
End: 2024-10-11
Attending: NURSE PRACTITIONER
Payer: MEDICARE

## 2024-10-11 VITALS
WEIGHT: 260 LBS | RESPIRATION RATE: 14 BRPM | BODY MASS INDEX: 35.21 KG/M2 | HEIGHT: 72 IN | OXYGEN SATURATION: 97 % | HEART RATE: 63 BPM | SYSTOLIC BLOOD PRESSURE: 152 MMHG | DIASTOLIC BLOOD PRESSURE: 88 MMHG

## 2024-10-11 DIAGNOSIS — Z86.79 S/P ABLATION OF ATRIAL FIBRILLATION: ICD-10-CM

## 2024-10-11 DIAGNOSIS — I10 PRIMARY HYPERTENSION: Chronic | ICD-10-CM

## 2024-10-11 DIAGNOSIS — Z98.890 S/P ABLATION OF ATRIAL FIBRILLATION: ICD-10-CM

## 2024-10-11 DIAGNOSIS — I48.0 PAROXYSMAL ATRIAL FIBRILLATION (HCC): ICD-10-CM

## 2024-10-11 LAB — EKG IMPRESSION: NORMAL

## 2024-10-11 PROCEDURE — 3077F SYST BP >= 140 MM HG: CPT | Performed by: NURSE PRACTITIONER

## 2024-10-11 PROCEDURE — 3079F DIAST BP 80-89 MM HG: CPT | Performed by: NURSE PRACTITIONER

## 2024-10-11 PROCEDURE — 93005 ELECTROCARDIOGRAM TRACING: CPT | Performed by: NURSE PRACTITIONER

## 2024-10-11 PROCEDURE — 99214 OFFICE O/P EST MOD 30 MIN: CPT | Performed by: NURSE PRACTITIONER

## 2024-10-11 PROCEDURE — 99212 OFFICE O/P EST SF 10 MIN: CPT | Performed by: NURSE PRACTITIONER

## 2024-10-11 PROCEDURE — 93010 ELECTROCARDIOGRAM REPORT: CPT | Performed by: INTERNAL MEDICINE

## 2024-10-11 ASSESSMENT — ENCOUNTER SYMPTOMS
SENSORY CHANGE: 0
CONSTITUTIONAL NEGATIVE: 1
DEPRESSION: 0
PND: 0
DIZZINESS: 0
GASTROINTESTINAL NEGATIVE: 1
PALPITATIONS: 0
EYES NEGATIVE: 1
SHORTNESS OF BREATH: 0
HALLUCINATIONS: 0
BRUISES/BLEEDS EASILY: 0
WHEEZING: 0
CLAUDICATION: 0
MUSCULOSKELETAL NEGATIVE: 1
NAUSEA: 0
NERVOUS/ANXIOUS: 0
RESPIRATORY NEGATIVE: 1
ORTHOPNEA: 0
NEUROLOGICAL NEGATIVE: 1

## 2024-10-11 ASSESSMENT — FIBROSIS 4 INDEX: FIB4 SCORE: 1.73

## 2024-11-04 ENCOUNTER — PHARMACY VISIT (OUTPATIENT)
Dept: PHARMACY | Facility: MEDICAL CENTER | Age: 69
End: 2024-11-04
Payer: COMMERCIAL

## 2024-11-04 PROCEDURE — RXMED WILLOW AMBULATORY MEDICATION CHARGE: Performed by: NURSE PRACTITIONER

## 2024-11-20 RX ORDER — ICOSAPENT ETHYL 1 G/1
2 CAPSULE ORAL 2 TIMES DAILY
Qty: 400 CAPSULE | Refills: 0 | Status: SHIPPED | OUTPATIENT
Start: 2024-11-20

## 2024-11-21 PROCEDURE — RXMED WILLOW AMBULATORY MEDICATION CHARGE: Performed by: NURSE PRACTITIONER

## 2024-11-21 NOTE — TELEPHONE ENCOUNTER
Received request via: Pharmacy    Was the patient seen in the last year in this department? Yes    Does the patient have an active prescription (recently filled or refills available) for medication(s) requested? No    Ph[facility] Name: [facility]     Does the patient have snf Plus and need 100-day supply? (This applies to ALL medications) Patient does not have SCP

## 2024-11-22 ENCOUNTER — PHARMACY VISIT (OUTPATIENT)
Dept: PHARMACY | Facility: MEDICAL CENTER | Age: 69
End: 2024-11-22
Payer: COMMERCIAL

## 2024-11-25 ENCOUNTER — TELEPHONE (OUTPATIENT)
Dept: HEALTH INFORMATION MANAGEMENT | Facility: OTHER | Age: 69
End: 2024-11-25
Payer: MEDICARE

## 2024-11-27 PROCEDURE — RXMED WILLOW AMBULATORY MEDICATION CHARGE: Performed by: NURSE PRACTITIONER

## 2024-12-05 ENCOUNTER — PHARMACY VISIT (OUTPATIENT)
Dept: PHARMACY | Facility: MEDICAL CENTER | Age: 69
End: 2024-12-05
Payer: COMMERCIAL

## 2024-12-11 ENCOUNTER — OFFICE VISIT (OUTPATIENT)
Dept: CARDIOLOGY | Facility: MEDICAL CENTER | Age: 69
End: 2024-12-11
Attending: NURSE PRACTITIONER
Payer: MEDICARE

## 2024-12-11 ENCOUNTER — TELEPHONE (OUTPATIENT)
Dept: CARDIOLOGY | Facility: MEDICAL CENTER | Age: 69
End: 2024-12-11

## 2024-12-11 VITALS
WEIGHT: 263 LBS | HEART RATE: 69 BPM | HEIGHT: 72 IN | BODY MASS INDEX: 35.62 KG/M2 | DIASTOLIC BLOOD PRESSURE: 88 MMHG | SYSTOLIC BLOOD PRESSURE: 138 MMHG | OXYGEN SATURATION: 96 %

## 2024-12-11 DIAGNOSIS — I48.0 PAROXYSMAL ATRIAL FIBRILLATION (HCC): ICD-10-CM

## 2024-12-11 LAB — EKG IMPRESSION: NORMAL

## 2024-12-11 PROCEDURE — 93005 ELECTROCARDIOGRAM TRACING: CPT | Mod: TC | Performed by: NURSE PRACTITIONER

## 2024-12-11 PROCEDURE — 93010 ELECTROCARDIOGRAM REPORT: CPT | Performed by: INTERNAL MEDICINE

## 2024-12-11 PROCEDURE — 3075F SYST BP GE 130 - 139MM HG: CPT | Performed by: NURSE PRACTITIONER

## 2024-12-11 PROCEDURE — 99212 OFFICE O/P EST SF 10 MIN: CPT | Performed by: NURSE PRACTITIONER

## 2024-12-11 PROCEDURE — RXMED WILLOW AMBULATORY MEDICATION CHARGE: Performed by: NURSE PRACTITIONER

## 2024-12-11 PROCEDURE — 3079F DIAST BP 80-89 MM HG: CPT | Performed by: NURSE PRACTITIONER

## 2024-12-11 PROCEDURE — 99214 OFFICE O/P EST MOD 30 MIN: CPT | Performed by: NURSE PRACTITIONER

## 2024-12-11 RX ORDER — FLECAINIDE ACETATE 50 MG/1
50 TABLET ORAL 2 TIMES DAILY
Qty: 180 TABLET | Refills: 3 | Status: SHIPPED | OUTPATIENT
Start: 2024-12-11

## 2024-12-11 ASSESSMENT — ENCOUNTER SYMPTOMS
GASTROINTESTINAL NEGATIVE: 1
DIZZINESS: 0
PALPITATIONS: 1
SENSORY CHANGE: 0
SHORTNESS OF BREATH: 0
HALLUCINATIONS: 0
RESPIRATORY NEGATIVE: 1
PND: 0
DEPRESSION: 0
CLAUDICATION: 0
ORTHOPNEA: 0
BRUISES/BLEEDS EASILY: 0
NERVOUS/ANXIOUS: 0
WHEEZING: 0
NAUSEA: 0
EYES NEGATIVE: 1
MUSCULOSKELETAL NEGATIVE: 1
NEUROLOGICAL NEGATIVE: 1

## 2024-12-11 ASSESSMENT — FIBROSIS 4 INDEX: FIB4 SCORE: 1.73

## 2024-12-11 NOTE — PROGRESS NOTES
Atrial Fibrillation Follow up Note    DOS: 12/11/2024   [id number]  Luan Schmidt    Chief complaint/Reason for consult: post ablation    HPI: Pt is a 69 y.o. male who presents to the clinic today in follow up for post ablation. Patient has a past medical history significant for but not limited to:hypothyroid, dyslipidemia, hypertension, atrial fibrillation, GERD. Patient underwent ablation approximately 3 months ago with targeted sites of isolation pulmonary veins and posterior wall via pulse field ablation. About 3 weeks ago patient started feeling off. In atrial fibrillation today on EKG. Discussed possible gap junctions. Will use flecainide and cardioversion to restore sinus. Patient would prefer to do ablation to clean up gaps.    The risks, benefits,and alternatives to Atrial Fibrillation ablation with general anesthesia were discussed in great detail. Catheter ablation is associated with less AF recurrence compared to antiarrhythmic medications per several studies (MICHAEL, EARLY-AF, STOP-AF).We discussed pulmonary vein isolation for therapeutic management and continued rhythm control. We discussed the risks and benefits of this procedure, with risks to include but not limited to: Risks include 1-3% risk of major cardiovascular event including stroke, myocardial infarction, phrenic nerve damage, esophageal injury and/or fistula formation, cardiac perforation, pericardial effusion, tamponade, major bleeding, or death.  Also mentioned was a 1/400 (0.25%) occurrence of atrial esophageal fistula, which is an abnormal communication between the esophagus and the left atrium; this complication is often fatal. Lastly the risks of death, myocardial infarction, stroke, DVT and PE were discussed. The patient verbalized understanding of these potential complications and wishes to proceed with this procedure.I quoted a 70 to 80% chance free of atrial fibrillation at 12 months.  We discussed that he may also need a  second procedure. The patients [id number] score is 3. HAS-BLED score is 2           Past Medical History:   Diagnosis Date    Arrhythmia     Arthritis     Disorder of thyroid     Dyslipidemia 2009    Heart burn     High cholesterol     Hypertension     Renal cyst 10/29/2015       Past Surgical History:   Procedure Laterality Date    APPENDECTOMY      OTHER  1966    Appendicitis       Social History     Socioeconomic History    Marital status:      Spouse name: Not on file    Number of children: Not on file    Years of education: Not on file    Highest education level: Not on file   Occupational History    Not on file   Tobacco Use    Smoking status: Former     Current packs/day: 0.00     Types: Cigarettes     Start date: 1972     Quit date: 1982     Years since quittin.9    Smokeless tobacco: Never    Tobacco comments:     15-20 y.a   Vaping Use    Vaping status: Never Used   Substance and Sexual Activity    Alcohol use: Yes     Alcohol/week: 12.0 oz     Types: 20 Glasses of wine per week     Comment: half bottle wine nightly     Drug use: No    Sexual activity: Not on file   Other Topics Concern    Not on file   Social History Narrative    Not on file     Social Drivers of Health     Financial Resource Strain: Not on file   Food Insecurity: Not on file   Transportation Needs: Not on file   Physical Activity: Not on file   Stress: Not on file   Social Connections: Not on file   Intimate Partner Violence: Not on file   Housing Stability: Not on file       Family History   Problem Relation Age of Onset    Heart Disease Father        No Known Allergies    Current Outpatient Medications   Medication Sig Dispense Refill    flecainide (TAMBOCOR) 50 MG tablet Take 1 Tablet by mouth 2 times a day. 180 Tablet 3    Icosapent Ethyl 1 g Cap Take 2 Capsules by mouth 2 times a day. 400 Capsule 0    apixaban (ELIQUIS) 5mg Tab Take 1 Tablet by mouth 2 times a day. (Patient taking differently: Take 5 mg  by mouth 2 times a day.     COORDINATE MEDICATION INSTRUCTIONS FROM PRESCRIBING PHYSICIAN) 180 Tablet 3    atorvastatin (LIPITOR) 20 MG Tab Take 1 Tablet by mouth every day. (Patient taking differently: Take 20 mg by mouth every day.     MEDICATION INSTRUCTIONS FOR SURGERY/PROCEDURE SCHEDULED FOR 9/17/24   CONTINUE TAKING MED PRIOR TO SURGERY) 100 Tablet 2    levothyroxine (SYNTHROID) 175 MCG Tab Take 1 Tablet by mouth every morning on an empty stomach. (Patient taking differently: Take 175 mcg by mouth every morning on an empty stomach.       MEDICATION INSTRUCTIONS FOR SURGERY/PROCEDURE SCHEDULED FOR 9/17/24   CONTINUE TAKING MED PRIOR TO SURGERY) 100 Tablet 2    lisinopril (PRINIVIL) 10 MG Tab Take 1 Tablet by mouth every day. (Patient taking differently: Take 10 mg by mouth every day.     MEDICATION INSTRUCTIONS FOR SURGERY/PROCEDURE SCHEDULED FOR 9/17/24   DO NOT TAKE 24 HOURS PRIOR TO SURGERY) 100 Tablet 2    metoprolol SR (TOPROL XL) 50 MG TABLET SR 24 HR Take 1 Tablet by mouth every day. (Patient taking differently: Take 50 mg by mouth every day.       MEDICATION INSTRUCTIONS FOR SURGERY/PROCEDURE SCHEDULED FOR 9/17/24   CONTINUE TAKING MED PRIOR TO SURGERY) 90 Tablet 3    omeprazole (PRILOSEC) 20 MG delayed-release capsule Take 1 Capsule by mouth every day. (Patient taking differently: Take 20 mg by mouth every day.     MEDICATION INSTRUCTIONS FOR SURGERY/PROCEDURE SCHEDULED FOR 9/17/24   CONTINUE TAKING MED PRIOR TO SURGERY) 100 Capsule 2    sertraline (ZOLOFT) 50 MG Tab Take 1.5 Tablets by mouth every day. (Patient taking differently: Take 75 mg by mouth every day.     MEDICATION INSTRUCTIONS FOR SURGERY/PROCEDURE SCHEDULED FOR 9/17/24   CONTINUE TAKING MED PRIOR TO SURGERY) 150 Tablet 2    allopurinol (ZYLOPRIM) 300 MG Tab Take 1 Tablet by mouth every day. (Patient taking differently: Take 300 mg by mouth every day.   MEDICATION INSTRUCTIONS FOR SURGERY/PROCEDURE SCHEDULED FOR 9/17/24   CONTINUE TAKING  MED PRIOR TO SURGERY) 100 Tablet 2     No current facility-administered medications for this visit.       Vitals:    12/11/24 1114   BP: 138/88   Pulse: 69   SpO2: 96%         Review of Systems   Constitutional:  Positive for malaise/fatigue.   HENT: Negative.     Eyes: Negative.    Respiratory: Negative.  Negative for shortness of breath and wheezing.    Cardiovascular:  Positive for palpitations. Negative for chest pain, orthopnea, claudication, leg swelling and PND.   Gastrointestinal: Negative.  Negative for nausea.   Genitourinary: Negative.    Musculoskeletal: Negative.    Skin: Negative.    Neurological: Negative.  Negative for dizziness and sensory change.   Endo/Heme/Allergies: Negative.  Does not bruise/bleed easily.   Psychiatric/Behavioral:  Negative for depression and hallucinations. The patient is not nervous/anxious.             EKG interpreted by me: AF    Physical Exam  Constitutional:       Appearance: Normal appearance.   HENT:      Head: Normocephalic.   Eyes:      Pupils: Pupils are equal, round, and reactive to light.   Neck:      Vascular: No JVD.   Cardiovascular:      Rate and Rhythm: Normal rate. Rhythm irregular.      Pulses: Normal pulses.      Heart sounds: Normal heart sounds.   Pulmonary:      Effort: Pulmonary effort is normal.      Breath sounds: Normal breath sounds.   Abdominal:      General: Abdomen is flat.      Palpations: Abdomen is soft.   Musculoskeletal:      Cervical back: Normal range of motion.      Right lower leg: No edema.      Left lower leg: No edema.   Skin:     General: Skin is warm and dry.   Neurological:      Mental Status: He is alert and oriented to person, place, and time.   Psychiatric:         Mood and Affect: Mood normal.         Behavior: Behavior normal.          Data:  Lipids:   Lab Results   Component Value Date/Time    CHOLSTRLTOT 209 (H) 02/21/2024 09:16 AM    TRIGLYCERIDE 385 (H) 02/21/2024 09:16 AM    HDL 40 02/21/2024 09:16 AM    LDL 92 02/21/2024  "09:16 AM        BMP:  Lab Results   Component Value Date/Time    SODIUM 140 09/13/2024 1014    POTASSIUM 4.4 09/13/2024 1014    CHLORIDE 105 09/13/2024 1014    CO2 24 09/13/2024 1014    GLUCOSE 107 (H) 09/13/2024 1014    BUN 19 09/13/2024 1014    CREATININE 1.07 09/13/2024 1014    CALCIUM 9.1 09/13/2024 1014    ANION 11.0 09/13/2024 1014       GFR:  Lab Results   Component Value Date/Time    IFAFRICA >60 07/29/2021 0623    IFNOTAFR >60 07/29/2021 0623        TSH:   Lab Results   Component Value Date/Time    TSHULTRASEN 1.550 02/21/2024 0916       MAGNESIUM:  Lab Results   Component Value Date/Time    MAGNESIUM 1.8 02/21/2024 0916        THYROXINE (T4):   No results found for: \"FREEDIR\"     CBC:   Lab Results   Component Value Date/Time    WBC 8.1 09/13/2024 10:14 AM    WBC 5.1 10/19/2012 09:42 AM    RBC 4.81 09/13/2024 10:14 AM    RBC 4.66 10/19/2012 09:42 AM    HEMOGLOBIN 14.8 09/13/2024 10:14 AM    HEMATOCRIT 44.5 09/13/2024 10:14 AM    MCV 92.5 09/13/2024 10:14 AM    MCV 94 10/19/2012 09:42 AM    MCH 30.8 09/13/2024 10:14 AM    MCH 32.2 10/19/2012 09:42 AM    MCHC 33.3 09/13/2024 10:14 AM    RDW 43.9 09/13/2024 10:14 AM    RDW 13.7 10/19/2012 09:42 AM    PLATELETCT 208 09/13/2024 10:14 AM    MPV 10.3 09/13/2024 10:14 AM    NEUTSPOLYS 64.60 09/13/2024 10:14 AM    LYMPHOCYTES 26.00 09/13/2024 10:14 AM    MONOCYTES 6.40 09/13/2024 10:14 AM    EOSINOPHILS 1.60 09/13/2024 10:14 AM    BASOPHILS 0.70 09/13/2024 10:14 AM    IMMGRAN 0.70 09/13/2024 10:14 AM    IMMGRAN 0 11/27/2013 10:14 AM    NRBC 0.00 09/13/2024 10:14 AM    NEUTS 5.24 09/13/2024 10:14 AM    NEUTS 2.5 11/27/2013 10:14 AM    LYMPHS 2.11 09/13/2024 10:14 AM    LYMPHS 1.9 11/27/2013 10:14 AM    MONOS 0.52 09/13/2024 10:14 AM    MONOS 0.5 11/27/2013 10:14 AM    EOS 0.13 09/13/2024 10:14 AM    EOS 0.1 11/27/2013 10:14 AM    BASO 0.06 09/13/2024 10:14 AM    BASO 0.0 11/27/2013 10:14 AM    IMMGRANAB 0.06 09/13/2024 10:14 AM    IMMGRANAB 0.0 11/27/2013 10:14 AM " "   NRBCAB 0.00 09/13/2024 10:14 AM        CBC w/o DIFF  Lab Results   Component Value Date/Time    WBC 8.1 09/13/2024 10:14 AM    WBC 5.1 10/19/2012 09:42 AM    RBC 4.81 09/13/2024 10:14 AM    RBC 4.66 10/19/2012 09:42 AM    HEMOGLOBIN 14.8 09/13/2024 10:14 AM    MCV 92.5 09/13/2024 10:14 AM    MCV 94 10/19/2012 09:42 AM    MCH 30.8 09/13/2024 10:14 AM    MCH 32.2 10/19/2012 09:42 AM    MCHC 33.3 09/13/2024 10:14 AM    RDW 43.9 09/13/2024 10:14 AM    RDW 13.7 10/19/2012 09:42 AM    MPV 10.3 09/13/2024 10:14 AM       LIVER:  Lab Results   Component Value Date/Time    ALKPHOSPHAT 76 09/13/2024 10:14 AM    ASTSGOT 27 09/13/2024 10:14 AM    ALTSGPT 26 09/13/2024 10:14 AM    TBILIRUBIN 0.6 09/13/2024 10:14 AM       BNP:  No results found for: \"BNPBTYPENAT\"    PT/INR:  Lab Results   Component Value Date/Time    PROTHROMBTM 13.4 09/13/2024 10:14 AM    INR 1.01 09/13/2024 10:14 AM             Impression/Plan:  Paroxysmal atrial fibrillation (HCC)   - recurrent post ablation   - flecainide 50mg twice daily with cardioversion    - long term re-ablate gap junctions   - continue metoprolol   - continue Eliquis          Lifestyle Modification for better heart health care as well as beneficial for prevention of worsening the atrial arrhythmia progression. Lifestyle modification discussed includes diet and reduction of sodium. Patients should eat more of a Mediterranean diet and be very careful with how much processed and fast food they eat. Excessive sodium intake can result in fluid retention which can add additional strain to patients cardiac electrical system. Weight loss can also help long term with cardiac health. For patients with BMI above 25kg/m2, studies have shown a greater chance of progression of disease as well as recurrence after interventions. ARREST-AF study showed less recurrence of AF and slower disease progression in patients with BMI below 27kg/m2. Smoking and vaping should be stopped. Moderation on caffeine " and alcohol. Excessive alcohol or caffeine can result in reactions and antagonize the hearts electrical system. Patient that fit the matrix for sleep apnea should be referred for a formal study and should try to be compliant with treatment for sleep apnea. Stay hydrated and stay active. Studies have shown that staying physically active can help heart health. The CARDIO-FIT study showed sedentary individuals lead to faster time of recurrence of arrhythmia. Exercise goals should be trying to maintain 120-200 minutes per week of exercise.          Alex Avitia AGACNP-EP  Cardiac Electrophysiology

## 2024-12-11 NOTE — ASSESSMENT & PLAN NOTE
- recurrent post ablation   - flecainide 50mg twice daily with cardioversion    - long term re-ablate gap junctions   - continue metoprolol   - continue Eliquis

## 2024-12-12 ENCOUNTER — PHARMACY VISIT (OUTPATIENT)
Dept: PHARMACY | Facility: MEDICAL CENTER | Age: 69
End: 2024-12-12
Payer: COMMERCIAL

## 2024-12-16 NOTE — TELEPHONE ENCOUNTER
Patient is scheduled for a Cardioversion with anesthesia on 12- with Dr. Mclean. Patient   has been instructed to check in at 10:00 am for 12:00 procedure. No meds to hold. Patient has been advised they will need a  home and with them after since they are sedated. Message sent to authorizations. Sent [facility] message to pt with instructions. No device. FYI sent to [place]

## 2024-12-17 ENCOUNTER — OFFICE VISIT (OUTPATIENT)
Dept: URGENT CARE | Facility: PHYSICIAN GROUP | Age: 69
End: 2024-12-17
Payer: MEDICARE

## 2024-12-17 VITALS
HEART RATE: 68 BPM | OXYGEN SATURATION: 95 % | SYSTOLIC BLOOD PRESSURE: 118 MMHG | TEMPERATURE: 97.7 F | HEIGHT: 72 IN | WEIGHT: 260 LBS | BODY MASS INDEX: 35.21 KG/M2 | RESPIRATION RATE: 12 BRPM | DIASTOLIC BLOOD PRESSURE: 70 MMHG

## 2024-12-17 DIAGNOSIS — R05.1 ACUTE COUGH: ICD-10-CM

## 2024-12-17 DIAGNOSIS — J01.90 ACUTE RHINOSINUSITIS: ICD-10-CM

## 2024-12-17 DIAGNOSIS — H10.33 ACUTE CONJUNCTIVITIS OF BOTH EYES, UNSPECIFIED ACUTE CONJUNCTIVITIS TYPE: ICD-10-CM

## 2024-12-17 PROCEDURE — 3074F SYST BP LT 130 MM HG: CPT | Performed by: STUDENT IN AN ORGANIZED HEALTH CARE EDUCATION/TRAINING PROGRAM

## 2024-12-17 PROCEDURE — 99213 OFFICE O/P EST LOW 20 MIN: CPT | Performed by: STUDENT IN AN ORGANIZED HEALTH CARE EDUCATION/TRAINING PROGRAM

## 2024-12-17 PROCEDURE — 3078F DIAST BP <80 MM HG: CPT | Performed by: STUDENT IN AN ORGANIZED HEALTH CARE EDUCATION/TRAINING PROGRAM

## 2024-12-17 RX ORDER — POLYMYXIN B SULFATE AND TRIMETHOPRIM 1; 10000 MG/ML; [USP'U]/ML
1 SOLUTION OPHTHALMIC EVERY 4 HOURS
Qty: 10 ML | Refills: 0 | Status: SHIPPED | OUTPATIENT
Start: 2024-12-17 | End: 2024-12-24 | Stop reason: SDUPTHER

## 2024-12-17 RX ORDER — FLUTICASONE PROPIONATE 50 MCG
1 SPRAY, SUSPENSION (ML) NASAL DAILY
Qty: 16 G | Refills: 0 | Status: SHIPPED | OUTPATIENT
Start: 2024-12-17 | End: 2024-12-24 | Stop reason: SDUPTHER

## 2024-12-17 RX ORDER — DOXYCYCLINE HYCLATE 100 MG
100 TABLET ORAL 2 TIMES DAILY
Qty: 14 TABLET | Refills: 0 | Status: SHIPPED | OUTPATIENT
Start: 2024-12-17 | End: 2024-12-24 | Stop reason: SDUPTHER

## 2024-12-17 RX ORDER — BENZONATATE 100 MG/1
100 CAPSULE ORAL 3 TIMES DAILY PRN
Qty: 60 CAPSULE | Refills: 0 | Status: SHIPPED | OUTPATIENT
Start: 2024-12-17 | End: 2024-12-24 | Stop reason: SDUPTHER

## 2024-12-17 ASSESSMENT — ENCOUNTER SYMPTOMS
VOMITING: 0
COUGH: 1
HEMOPTYSIS: 0
SINUS PAIN: 1
CONSTIPATION: 0
PHOTOPHOBIA: 0
EYE REDNESS: 1
SHORTNESS OF BREATH: 0
BLURRED VISION: 0
SORE THROAT: 0
WHEEZING: 1
EYE PAIN: 0
SPUTUM PRODUCTION: 1
DIARRHEA: 0
DOUBLE VISION: 0
EYE DISCHARGE: 1
NAUSEA: 0
ABDOMINAL PAIN: 0
FEVER: 0

## 2024-12-17 ASSESSMENT — FIBROSIS 4 INDEX: FIB4 SCORE: 1.73

## 2024-12-17 NOTE — PROGRESS NOTES
Subjective     Luan Schmidt is a 69 y.o. male who presents with Congestion (Cough, wheezing, sinus pain/pressure, possible pnk eye)            Luan is a 69 y.o. male who presents to urgent care with cough, nasal congestion, sinus pain/pressure.  Symptoms started about a week ago.  He recently developed bilateral eye redness with discharge/drainage and crusting in the a.m. which also makes him concerned for pinkeye.  Cough is productive in nature and does report some associated wheezing.  He has had some ear pain/pressure as well.  No SOB.  Patient has taken at home COVID test which have been negative.        Review of Systems   Constitutional:  Positive for malaise/fatigue. Negative for fever.   HENT:  Positive for congestion, ear pain and sinus pain. Negative for sore throat.    Eyes:  Positive for discharge and redness. Negative for blurred vision, double vision, photophobia and pain.   Respiratory:  Positive for cough, sputum production and wheezing. Negative for hemoptysis and shortness of breath.    Gastrointestinal:  Negative for abdominal pain, constipation, diarrhea, nausea and vomiting.   All other systems reviewed and are negative.             Objective     /70 (BP Location: Right arm, Patient Position: Sitting, BP Cuff Size: Adult)   Pulse 68   Temp 36.5 °C (97.7 °F) (Temporal)   Resp 12   Ht 1.829 m (6')   Wt 118 kg (260 lb)   SpO2 95%   BMI 35.26 kg/m²      Physical Exam  Vitals reviewed.   Constitutional:       General: He is not in acute distress.     Appearance: Normal appearance. He is not toxic-appearing.   HENT:      Head: Normocephalic and atraumatic.      Right Ear: Ear canal and external ear normal. A middle ear effusion is present.      Left Ear: Ear canal and external ear normal. A middle ear effusion is present.      Nose: Mucosal edema and congestion present.      Mouth/Throat:      Mouth: Mucous membranes are moist.   Eyes:      General:         Right eye: No  discharge.         Left eye: No discharge.      Extraocular Movements: Extraocular movements intact.      Conjunctiva/sclera: Conjunctivae normal.      Pupils: Pupils are equal, round, and reactive to light.   Cardiovascular:      Rate and Rhythm: Normal rate.   Pulmonary:      Effort: Pulmonary effort is normal. No respiratory distress.      Breath sounds: Normal breath sounds. No stridor. No wheezing, rhonchi or rales.   Skin:     General: Skin is warm and dry.   Neurological:      General: No focal deficit present.      Mental Status: He is alert and oriented to person, place, and time.                             Assessment & Plan        Assessment & Plan  Acute rhinosinusitis    Orders:    doxycycline (VIBRAMYCIN) 100 MG Tab; Take 1 Tablet by mouth 2 times a day for 7 days.    fluticasone (FLONASE) 50 MCG/ACT nasal spray; Administer 1 Spray into affected nostril(S) every day.    Acute conjunctivitis of both eyes, unspecified acute conjunctivitis type    Orders:    polymixin-trimethoprim (POLYTRIM) 78203-1.1 UNIT/ML-% Solution; Administer 1 Drop into both eyes every 4 hours for 7 days.    Acute cough    Orders:    benzonatate (TESSALON) 100 MG Cap; Take 1 Capsule by mouth 3 times a day as needed for Cough.           Differential diagnoses, supportive care measures and indications for immediate follow-up discussed with patient. Pathogenesis of diagnosis discussed including typical length and natural progression.      Instructed to return to urgent care or nearest emergency department if symptoms fail to improve, for any change in condition, further concerns, or new concerning symptoms.    Patient states understanding and agrees with the plan of care and discharge instructions.

## 2024-12-18 ENCOUNTER — TELEPHONE (OUTPATIENT)
Dept: CARDIOLOGY | Facility: MEDICAL CENTER | Age: 69
End: 2024-12-18
Payer: MEDICARE

## 2024-12-18 NOTE — TELEPHONE ENCOUNTER
Dr. Alvarez,    Are there any medications you would like this patient to hold for this procedure?    Thank You,  Lynne

## 2024-12-18 NOTE — TELEPHONE ENCOUNTER
----- Message from Nurse Practitioner DEEJAY Layne sent at 12/18/2024 11:16 AM PST -----  Regarding: FW: re-ablate  Orders placed  ----- Message -----  From: Jesenia Alvarez M.D.  Sent: 12/18/2024  10:42 AM PST  To: DEEJAY Ricketts  Subject: RE: re-ablate                                    Can plan repeat if he is interested  ----- Message -----  From: DEEJAY Ricketts  Sent: 12/11/2024   1:02 PM PST  To: Jesenia Alvarez M.D.  Subject: re-ablate                                        AF

## 2024-12-19 NOTE — TELEPHONE ENCOUNTER
Jeesnia Alvarez M.D.  Lynne Guzman, Med Ass't  Caller: Unspecified (Yesterday, 11:40 AM)  No meds from my end

## 2024-12-23 ENCOUNTER — PRE-ADMISSION TESTING (OUTPATIENT)
Dept: ADMISSIONS | Facility: MEDICAL CENTER | Age: 69
End: 2024-12-23
Attending: INTERNAL MEDICINE
Payer: MEDICARE

## 2024-12-23 NOTE — TELEPHONE ENCOUNTER
LM on patient's voicemail requesting a call back when he is ready to proceed with scheduling this procedure.

## 2024-12-23 NOTE — TELEPHONE ENCOUNTER
LM for pt that his new check in time is now 12:00noon  on 12-31-24. Also sent a AVIcode message to patient with new check in time.

## 2024-12-23 NOTE — PREADMIT AVS NOTE
Current Medications   Medication Instructions    benzonatate (TESSALON) 100 MG Cap Continue taking medication as prescribed, including morning of procedure     polymixin-trimethoprim (POLYTRIM) 23701-9.1 UNIT/ML-% Solution Continue taking medication as prescribed, including morning of procedure     doxycycline (VIBRAMYCIN) 100 MG Tab Continue taking medication as prescribed, including morning of procedure     fluticasone (FLONASE) 50 MCG/ACT nasal spray Continue taking medication as prescribed, including morning of procedure     flecainide (TAMBOCOR) 50 MG tablet Continue taking medication as prescribed, including morning of procedure     Icosapent Ethyl 1 g Cap Stop 7 days before surgery    apixaban (ELIQUIS) 5mg Tab Follow instructions from surgeon or specialist.    atorvastatin (LIPITOR) 20 MG Tab Continue taking medication as prescribed, including morning of procedure     levothyroxine (SYNTHROID) 175 MCG Tab Continue taking medication as prescribed, including morning of procedure     lisinopril (PRINIVIL) 10 MG Tab Stop 24 hours before surgery    metoprolol SR (TOPROL XL) 50 MG TABLET SR 24 HR Continue taking medication as prescribed, including morning of procedure     omeprazole (PRILOSEC) 20 MG delayed-release capsule Continue taking medication as prescribed, including morning of procedure     sertraline (ZOLOFT) 50 MG Tab Continue taking medication as prescribed, including morning of procedure     allopurinol (ZYLOPRIM) 300 MG Tab Continue taking medication as prescribed, including morning of procedure

## 2024-12-24 ENCOUNTER — TELEPHONE (OUTPATIENT)
Dept: CARDIOLOGY | Facility: MEDICAL CENTER | Age: 69
End: 2024-12-24
Payer: MEDICARE

## 2024-12-24 DIAGNOSIS — H10.33 ACUTE CONJUNCTIVITIS OF BOTH EYES, UNSPECIFIED ACUTE CONJUNCTIVITIS TYPE: ICD-10-CM

## 2024-12-24 DIAGNOSIS — J01.90 ACUTE RHINOSINUSITIS: ICD-10-CM

## 2024-12-24 DIAGNOSIS — R05.1 ACUTE COUGH: ICD-10-CM

## 2024-12-24 RX ORDER — POLYMYXIN B SULFATE AND TRIMETHOPRIM 1; 10000 MG/ML; [USP'U]/ML
1 SOLUTION OPHTHALMIC EVERY 4 HOURS
Qty: 10 ML | Refills: 0 | Status: SHIPPED | OUTPATIENT
Start: 2024-12-24 | End: 2024-12-30

## 2024-12-24 RX ORDER — DOXYCYCLINE HYCLATE 100 MG
100 TABLET ORAL 2 TIMES DAILY
Qty: 14 TABLET | Refills: 0 | Status: SHIPPED | OUTPATIENT
Start: 2024-12-24 | End: 2024-12-30

## 2024-12-24 RX ORDER — BENZONATATE 100 MG/1
100 CAPSULE ORAL 3 TIMES DAILY PRN
Qty: 60 CAPSULE | Refills: 0 | Status: SHIPPED | OUTPATIENT
Start: 2024-12-24 | End: 2024-12-30

## 2024-12-24 RX ORDER — FLUTICASONE PROPIONATE 50 MCG
1 SPRAY, SUSPENSION (ML) NASAL DAILY
Qty: 16 G | Refills: 0 | Status: SHIPPED | OUTPATIENT
Start: 2024-12-24

## 2024-12-24 NOTE — PROGRESS NOTES
See Amy request to have prescription sent to local pharmacy ordered by the urgent care provider.  This was sent to his mail away pharmacy.

## 2024-12-25 PROBLEM — R01.1 UNDIAGNOSED CARDIAC MURMURS: Status: RESOLVED | Noted: 2024-03-11 | Resolved: 2024-12-25

## 2024-12-27 ENCOUNTER — PRE-ADMISSION TESTING (OUTPATIENT)
Dept: ADMISSIONS | Facility: MEDICAL CENTER | Age: 69
End: 2024-12-27
Attending: INTERNAL MEDICINE
Payer: MEDICARE

## 2024-12-27 DIAGNOSIS — Z01.812 PRE-OPERATIVE LABORATORY EXAMINATION: ICD-10-CM

## 2024-12-27 LAB
ALBUMIN SERPL BCP-MCNC: 4.1 G/DL (ref 3.2–4.9)
ALBUMIN/GLOB SERPL: 1.2 G/DL
ALP SERPL-CCNC: 109 U/L (ref 30–99)
ALT SERPL-CCNC: 23 U/L (ref 2–50)
ANION GAP SERPL CALC-SCNC: 15 MMOL/L (ref 7–16)
AST SERPL-CCNC: 17 U/L (ref 12–45)
BILIRUB SERPL-MCNC: 0.3 MG/DL (ref 0.1–1.5)
BUN SERPL-MCNC: 22 MG/DL (ref 8–22)
CALCIUM ALBUM COR SERPL-MCNC: 9.4 MG/DL (ref 8.5–10.5)
CALCIUM SERPL-MCNC: 9.5 MG/DL (ref 8.5–10.5)
CHLORIDE SERPL-SCNC: 103 MMOL/L (ref 96–112)
CO2 SERPL-SCNC: 21 MMOL/L (ref 20–33)
CREAT SERPL-MCNC: 0.9 MG/DL (ref 0.5–1.4)
ERYTHROCYTE [DISTWIDTH] IN BLOOD BY AUTOMATED COUNT: 41.3 FL (ref 35.9–50)
GFR SERPLBLD CREATININE-BSD FMLA CKD-EPI: 93 ML/MIN/1.73 M 2
GLOBULIN SER CALC-MCNC: 3.4 G/DL (ref 1.9–3.5)
GLUCOSE SERPL-MCNC: 151 MG/DL (ref 65–99)
HCT VFR BLD AUTO: 45.5 % (ref 42–52)
HGB BLD-MCNC: 15.6 G/DL (ref 14–18)
INR PPP: 0.94 (ref 0.87–1.13)
MCH RBC QN AUTO: 31.1 PG (ref 27–33)
MCHC RBC AUTO-ENTMCNC: 34.3 G/DL (ref 32.3–36.5)
MCV RBC AUTO: 90.8 FL (ref 81.4–97.8)
PLATELET # BLD AUTO: 221 K/UL (ref 164–446)
PMV BLD AUTO: 9.8 FL (ref 9–12.9)
POTASSIUM SERPL-SCNC: 4.1 MMOL/L (ref 3.6–5.5)
PROT SERPL-MCNC: 7.5 G/DL (ref 6–8.2)
PROTHROMBIN TIME: 12.6 SEC (ref 12–14.6)
RBC # BLD AUTO: 5.01 M/UL (ref 4.7–6.1)
SODIUM SERPL-SCNC: 139 MMOL/L (ref 135–145)
WBC # BLD AUTO: 8.5 K/UL (ref 4.8–10.8)

## 2024-12-27 PROCEDURE — 36415 COLL VENOUS BLD VENIPUNCTURE: CPT

## 2024-12-27 PROCEDURE — 85027 COMPLETE CBC AUTOMATED: CPT

## 2024-12-27 PROCEDURE — 85610 PROTHROMBIN TIME: CPT

## 2024-12-27 PROCEDURE — 80053 COMPREHEN METABOLIC PANEL: CPT

## 2024-12-30 ENCOUNTER — OFFICE VISIT (OUTPATIENT)
Dept: MEDICAL GROUP | Facility: MEDICAL CENTER | Age: 69
End: 2024-12-30
Payer: MEDICARE

## 2024-12-30 VITALS
OXYGEN SATURATION: 96 % | SYSTOLIC BLOOD PRESSURE: 126 MMHG | WEIGHT: 260 LBS | DIASTOLIC BLOOD PRESSURE: 68 MMHG | BODY MASS INDEX: 35.21 KG/M2 | TEMPERATURE: 97.5 F | HEIGHT: 72 IN | HEART RATE: 65 BPM

## 2024-12-30 DIAGNOSIS — E55.9 VITAMIN D DEFICIENCY: ICD-10-CM

## 2024-12-30 DIAGNOSIS — E61.1 IRON DEFICIENCY: ICD-10-CM

## 2024-12-30 DIAGNOSIS — Z23 NEED FOR PNEUMOCOCCAL VACCINATION: ICD-10-CM

## 2024-12-30 DIAGNOSIS — I48.0 PAROXYSMAL ATRIAL FIBRILLATION (HCC): ICD-10-CM

## 2024-12-30 DIAGNOSIS — G47.30 SLEEP DISORDER BREATHING: ICD-10-CM

## 2024-12-30 DIAGNOSIS — E66.01 SEVERE OBESITY (HCC): ICD-10-CM

## 2024-12-30 DIAGNOSIS — Z23 NEEDS FLU SHOT: ICD-10-CM

## 2024-12-30 DIAGNOSIS — E78.5 DYSLIPIDEMIA: Chronic | ICD-10-CM

## 2024-12-30 DIAGNOSIS — R73.09 IMPAIRED GLUCOSE METABOLISM: ICD-10-CM

## 2024-12-30 DIAGNOSIS — D32.9 MENINGIOMA (HCC): ICD-10-CM

## 2024-12-30 DIAGNOSIS — I10 PRIMARY HYPERTENSION: ICD-10-CM

## 2024-12-30 DIAGNOSIS — Z00.00 PREVENTATIVE HEALTH CARE: ICD-10-CM

## 2024-12-30 DIAGNOSIS — E53.8 B12 DEFICIENCY: ICD-10-CM

## 2024-12-30 DIAGNOSIS — R79.0 LOW MAGNESIUM LEVEL: ICD-10-CM

## 2024-12-30 DIAGNOSIS — M10.9 GOUT OF FOOT, UNSPECIFIED CAUSE, UNSPECIFIED CHRONICITY, UNSPECIFIED LATERALITY: ICD-10-CM

## 2024-12-30 ASSESSMENT — FIBROSIS 4 INDEX: FIB4 SCORE: 1.09

## 2024-12-30 NOTE — PROGRESS NOTES
Subjective     Luan Schmidt is a 69 y.o. male who presents with Follow-Up (Patient was told Dr. Rutherford wanted to see him before the end of the year// pneumococcal vaccine )            HPI      Here for follow up labs, patient with atrial fibrillation, status post pulmonary vein isolation in September, atrial fibrillation recurred seen by cardiology December 11, continues on Eliquis, flecainide, Toprol 50 mg, lisinopril 10 mg, pending cardioversion and follow-up ablation, heart rate remains in the 60s on metoprolol, no bleeding issues with Eliquis, no NSAIDs, no falling, limit alcohol intake, blood pressure will run 120s over 70s, no chest pain, shortness of breath, lightheadedness, syncope, palpitations.  Exercising 2 days a week, treadmill up to 3 mph 30 to 45 minutes, and weight training.  Treadmill does not bother his joints.  Seen by urgent care for sinus congestion given doxycycline and Tessalon, did not start the medications as he had difficulty getting that filled from urgent care.  His symptoms have resolved.  He is here for the flu and the pneumococcal vaccine.  Continues on icosapent fish oil tablets two twice a day as well as atorvastatin.  Gout has been stable no flareup on allopurinol.  Tries to limit shellfish as well as alcohol.  Continues on Prilosec, no breakthrough reflux or heartburn on Prilosec.  Continues on sertraline no mood changes.  On thyroid medication as well.  Tries to eat a healthy diet limiting sweets, candies, processed foods, no weight gain over the past year.  Does snore at times, no formal diagnosis of sleep apnea in the past.        Current Outpatient Medications   Medication Sig Dispense Refill    benzonatate (TESSALON) 100 MG Cap Take 1 Capsule by mouth 3 times a day as needed for Cough. 60 Capsule 0    doxycycline (VIBRAMYCIN) 100 MG Tab Take 1 Tablet by mouth 2 times a day for 7 days. 14 Tablet 0    fluticasone (FLONASE) 50 MCG/ACT nasal spray Administer 1 Spray  into affected nostril(S) every day. 16 g 0    polymixin-trimethoprim (POLYTRIM) 51349-9.1 UNIT/ML-% Solution Administer 1 Drop into both eyes every 4 hours for 7 days. 10 mL 0    flecainide (TAMBOCOR) 50 MG tablet Take 1 tablet by mouth 2 times a day. 180 Tablet 3    Icosapent Ethyl 1 g Cap Take 2 Capsules by mouth 2 times a day. 400 Capsule 0    apixaban (ELIQUIS) 5mg Tab Take 1 Tablet by mouth 2 times a day. (Patient taking differently: Take 5 mg by mouth 2 times a day.   ) 180 Tablet 3    atorvastatin (LIPITOR) 20 MG Tab Take 1 Tablet by mouth every day. (Patient taking differently: Take 20 mg by mouth every day.   ) 100 Tablet 2    levothyroxine (SYNTHROID) 175 MCG Tab Take 1 Tablet by mouth every morning on an empty stomach. (Patient taking differently: Take 175 mcg by mouth every morning on an empty stomach.   ) 100 Tablet 2    lisinopril (PRINIVIL) 10 MG Tab Take 1 Tablet by mouth every day. (Patient taking differently: Take 10 mg by mouth every day.   ) 100 Tablet 2    metoprolol SR (TOPROL XL) 50 MG TABLET SR 24 HR Take 1 Tablet by mouth every day. (Patient taking differently: Take 50 mg by mouth every day.   ) 90 Tablet 3    omeprazole (PRILOSEC) 20 MG delayed-release capsule Take 1 Capsule by mouth every day. (Patient taking differently: Take 20 mg by mouth every day.   ) 100 Capsule 2    sertraline (ZOLOFT) 50 MG Tab Take 1.5 Tablets by mouth every day. (Patient taking differently: Take 75 mg by mouth every day.   ) 150 Tablet 2    allopurinol (ZYLOPRIM) 300 MG Tab Take 1 Tablet by mouth every day. 100 Tablet 2     No current facility-administered medications for this visit.                       adenoma  1/9/23 colon per [facility] 4 polyps adenoma repeat in 3 years     Allergic rhinitis  12/15/20 trial flonase, claritin made headache   1/25/21 has been using sudafed over-the-counter with success but we would like to avoid long-term use, will change to astelin instead  4/1/21 trial of otc zyrtec and flonase      Anxiety  8/19/16 RONALD score 10, PHQ 4, start zoloft 50 mg, declines psychotherapy  10/16/17 on zoloft 75 mg   12/3/19 on zoloft 75 mg add pamelor 10 mg for headache  8/31/20 on zoloft 75 mg off pamelor   11/1/22 on zoloft 75 mg       Dyslipidemia  3/09 chol  299, trig 367,hdl 47, ldl 179 started on simcor no followup testing   6/10 chol 278,trig 366,hdl 51,ldl 154 add fish oil 2 pills bid, will not increase simcor due to hx gout  4/11 off simcor  4/11 chol 215,trig 232,hdl 39,ldl 130 off meds  12/31/11 chol 245,trig 240,hdl 48,ldl 149 off meds  10/12 chol 268,trig 239,hdl 54,ldl 166 start fish oil two pills daily  11/29/13 chol 265,trig 229,hdl 46,ldl 168  12/19/13 start lipitor 20 mg  3/27/14 chol 190,trig 164,hdl 51,ldl 106 on lipitor 20 mg  8/29/14 chol 208,trig 280,hdl 51,ldl 101 on lipitor 20 mg  10/15/15 chol 187,trig 223,hdl 46,ldl 96 on lipitor 20 mg  9/16/16 chol 182,trig 262,hdl 46,ldl 84 on lipitor 20 mg  10/4/17 chol 221,trig 324,hdl 51,ldl 105 on lipitor 20 mg  7/2/19 chol 216,trig 312,hdl 41,ldl 113 on lipitor 20 mg  10/20/20 chol 266,trig 475,hdl 43 on lipitor 20 mg  10/28/20 declines fibrate  12/11/20 chol 217,trig 326,hdl 38,ldl 114 on lipitor 20 mg  4/1/21 patient will check with insurance to see which is covered  7/29/21 chol 227,trig 452,hdl 39 on lipitor 20 mg   11/1/22 resume vascepa   1/9/23 chol 195,trig 246,hdl 40,ldl 106 on lipitor 20 mg and vascepa  2/21/24 chol 209,trig 385,hdl 40,ldl 92 on lipitor 20 mg and vascepa not taking vascepa 2 bid consistently will repeat 3-4 months labs printed to mail also check uric     gerd  10/9/15 dyspepsia, chest x-ray, ultrasound, stress echo pending  10/29/15 ultrasound enlarged liver increased echotexture fatty infiltration or hepatocellular disease   11/31/15 TSH ordered for next month and labs for peripheral neuropathy feet, declines MRI lumbar spine repeat EMG NCS for now, cont limit etoh  11/19/15 stress echo mild concentric LVH on baseline echo,  excellent exercise tolerance, negative stress echo for ischemia, brenna protocol 11 minutes 48 seconds  11/19/15 cxr negative  2/28/16 [facility] consultation note, solid food dysphagia, will arrange for EGD  3/8/18 EGD per [facility] grade B esophagitis and gastritis, biopsy performed, take prilosec 20 mg daily   3/23/18 [facility] note dysphagia, recent EGD grade B esophagitis, biopsies negative, swelling has improved, did not start omeprazole, unsure if this is hypertonic UES versus cricopharyngeal achalasia or hypertrophy given lack of eosinophilic esophagitis or schatzki's ring, recommend trial omeprazole for 2 months then wean off if possible  2/21/24 b12 448,vit d 32,magnesium 1.8 on prilosec    gout  On no medication for over 2 years  6/10 uric 9.6 monitor for gout flare up since on simcor  4/18/11 left elbow pain question gout flareup  4/11 uric 7.1  12/11 uric 7.0  1/17/12 gout flare up  10/12 uric 7.8  11/29/13 uric 7.9  8/29/14 uric 7.9  10/15/15 uric 8.8  9/16/16 uric 7.7  10/4/17 uric 8.7  7/2/19 uric 9.4  7/8/19 declines allopurinol  10/20/20 uric 9.7  10/28/20 start allopurinol 300 mg repeat uric and labs 4 weeks  12/11/20 uric 6.6 on allopurinol 300 mg  7/29/21 uric 5.2 on allopurinol 300 mg  1/9/23 uric 4.7 on allopurinol 300 mg     headache  10/2/19 MRI brain negative  12/3/19 headache, question neuralgia, trial of pamelor 10 mg  12/23/19 increase pamelor to 20 mg  1/30/20 on pamelor 10 mg effective  8/31/20 orthostatic headache, referral neurology, trial of gabapentin, MRI already done, consider MRI with gadolinium  10/20/20 ESR 3  11/3/20  neurology note primary cough headache, obtain MRI head with and without, CTA head and follow up 2 months    2/26/21 MRI brain with and without per neurology, 5 x 11 mm small enhancing extra-axial lesion base medial right frontal lobe likely incidental meningioma  8/9/21 headaches improved with nasal lavage daily   1/9/23 MRI brain with and without, stable tiny meningioma  9 x 5 x 7 mm, no mass-effect     Hearing loss  9/12  audiology note mild high-frequency sensorineural loss right ear, brainstem response normal     Hypertension  6/14/10 add lisinopril 10 mg to toprol  mg  3/15/13 treadmill thallium, treadmill component 10 minutes 48 seconds 1 mm upsloping ST segment depression nondiagnostic, hypertensive response to exercise; ejection fraction 61%, no ischemia noted  11/29/13 urine mac < 2.4 on lisinopril 10 mg and toprol  mg  10/15/15 urine mac <0.5 on lisinopril 10 mg and toprol  mg  11/19/15 stress echo mild concentric LVH on baseline echo, excellent exercise tolerance, negative stress echo for ischemia, brenna protocol 11 minutes 48 seconds  9/16/16 urine mac<0.7 on lisinopril 10 mg and toprol  mg  10/4/17 urine mac 8.7 on lisinopril 10 mg and toprol  mg  2/8/18 treadmill stress test, brenna protocol 10 minutes, 12.8 METS, target heart rate achieved, no chest pain, no t-wave changes, normal stress EKG  12/3/19 increase lisinopril to 20 mg daily and continue toprol  mg  10/21/20 urine mac<1.2 on lisinopril 20 mg and toprol  mg  7/29/21 urine mac<1.2 on lisinopril 20 mg and toprol  mg  1/9/23 urine mac<1.2 on lisinopril 20 mg and toprol  mg  1/10/23 treadmill stress test brenna protocol 9 minutes 30 seconds, 11.2 METs, no ischemic changes  2/21/24 urine mac<1.2 on lisinopril 20 mg and toprol  mg  3/13/24 echo bubble study, normal LV size, mild concentric LVH, normal systolic function EF 60 to 65%, indeterminate diastolic function, mildly dilated left atrium  3/13/24 cardiology note patient wearing event monitor alert for atrial fibrillation, suspect patient is having sinus bradycardia with intermittent sinus arrhythmia and not actively having atrial fibrillation, recommend decreasing metoprolol from 100 mg to 50 mg may need to increase lisinopril with a decrease in beta-blocker, EKG in office sinus bradycardia, will  review with electrophysiology, check echocardiogram  2/15/24 to 3/15/24 [facility] total time 22 days, 13 hours, 57 minutes, average heart rate 63, high heart rate 141, low heart rate 48, monitor report note atrial fibrillation 764 times, burden 8%, SVT 1 time, fastest 117 bpm, 1 episode first-degree heart block rate 57   5/10/24 EKG sinus 59  5/10/24 cardiology note referral electrophysiology, start eliquis 5 mg bid  9/17/24 NEGRITO left ventricle normal size and thickness, EF 55%, no evidence of intracardiac thrombus  12/11/24  cardiology EPS note patients [id number] score is KATT-VASc = 3,BLED score=2, start flecainide 50 mg bid and continue eliquis, scheduled for ablation  12/25/24 on flecainide, eliquis, toprol xl 50 mg, lisinopril 10 mg     Hypothyroid  3/09 tsh 7.5 at that time had been on 0.2 mg half a tablet plus 0.125 for a total of 0.225 daily, this was changed to 0.2 mg one tablet po every Tuesday Thursday Saturday Sunday and 0.2 mg 1-1/2 tablets on Monday Wednesday Friday  6/10 tsh 0.148  6/23/10 change to levothyroxine 0.2 daily repeat in 6 weeks  8/27/10 tsh 2.7   4/11 tsh 0.6 on levothyroxine 200 mcg  10/12 tsh 27 on levothyroxine 200 mcg but not taking regularly  11/29/13 tsh 8.6 on levothyroxine 200 mcg daily  12/19/13 take levothyroxine 200 mg before eating  3/27/14 tsh 0.8 on levothyroxine 200 mcg  8/29/14 tsh 8.3 on levothyroxine 200 mcg taking thyroid med with supplements in am he will try to change to taking medication separate from everything, repeat labs 6 weeks  10/15/15 tsh 0.12 on levothyroxine 200 mcg daily change to levothyroxine 175 mcg  3/23/16 tsh 1.1 on levothyroxine 175 mcg  9/16/16 tsh 0.1 on levothyroxine 175 mcg change to one po six days per week, repeat tsh 6 weeks  1/30/17 tsh 1.3 on levothyroxine 175 mcg six days per week  10/4/17 tsh 2.1 on levothyroxine 175 mcg six days per week  7/2/19 tsh 8.6 on levothyroxine 175 mcg six days per week  7/8/19 change to levothyroxine  175 mcg daily repeat tsh 6 weeks  10/20/20 tsh 7.0 on levothyroxine 175 mcg taking six days per week, will change to daily  12/11/20 tsh 0.72 on levothyroxine 175 mcg daily    7/29/21 tsh 3.2 on levothyroxine 175 mcg daily   1/9/23 tsh 1.3 on levothyroxine 175 mcg  10/3/23 tsh 1.6 on levothyroxine `175 mcg  2/21/24 tsh 1.5 on levothyroxine 175 mcg    low back pain  1/9/23 x-ray lumbar spine degenerative disc disease     meningioma  10/2/19 MRI brain negative  11/3/20  neurology note primary cough headache, obtain MRI head with and without, CTA head and follow up 2 months    2/26/21 MRI brain with and without per neurology, 5 x 11 mm small enhancing extra-axial lesion base medial right frontal lobe likely incidental meningioma  10/2/19 MRI brain negative  11/3/20  neurology note primary cough headache, obtain MRI head with and without, CTA head and follow up 2 months    2/26/21 MRI brain with and without per neurology, 5 x 11 mm small enhancing extra-axial lesion base medial right frontal lobe likely incidental meningioma  1/9/23 MRI brain with and without, stable tiny meningioma 9 x 5 x 7 mm, no mass-effect    parox a.fib  3/15/13 treadmill thallium, treadmill component 10 minutes 48 seconds 1 mm upsloping ST segment depression nondiagnostic, hypertensive response to exercise; ejection fraction 61%, no ischemia noted  11/19/15 stress echo mild concentric LVH on baseline echo, excellent exercise tolerance, negative stress echo for ischemia, brenna protocol 11 minutes 48 seconds  2/8/18 treadmill stress test, brenna protocol 10 minutes, 12.8 METS, target heart rate achieved, no chest pain, no t-wave changes, normal stress EKG  1/10/23 treadmill stress test brenna protocol 9 minutes 30 seconds, 11.2 METs, no ischemic changes  2/21/24 urine mac<1.2 on lisinopril 20 mg and toprol  mg  3/13/24 echo bubble study, normal LV size, mild concentric LVH, normal systolic function EF 60 to 65%,  indeterminate diastolic function, mildly dilated left atrium  3/13/24 cardiology note patient wearing event monitor alert for atrial fibrillation, suspect patient is having sinus bradycardia with intermittent sinus arrhythmia and not actively having atrial fibrillation, recommend decreasing metoprolol from 100 mg to 50 mg may need to increase lisinopril with a decrease in beta-blocker, EKG in office sinus bradycardia, will review with electrophysiology, check echocardiogram  3/13/24 EKG sinus 59  2/15/24 to 3/15/24 [facility] total time 22 days, 13 hours, 57 minutes, average heart rate 63, high heart rate 141, low heart rate 48, monitor report note atrial fibrillation 764 times, burden 8%, SVT 1 time, fastest 117 bpm, 1 episode first-degree heart block rate 57   5/10/24 EKG sinus 59  5/10/24 cardiology note referral electrophysiology, start eliquis 5 mg bid  9/17/24 NEGRITO left ventricle normal size and thickness, EF 55%, no evidence of intracardiac thrombus  9/17/24  cardiology EPS procedure note successful PFA pulmonary vein isolation and posterior wall isolation procedure   12/11/24  cardiology status post ablation, EKG shows atrial fibrillation, [id number] score is KATT-VASc = 3,BLED score=2, start flecainide 50 mg bid and continue eliquis discussed rhythm control versus procedure, patient would like to pursue ablation again  12/25/24 on flecainide, eliquis, toprol xl 50 mg, lisinopril 10 mg     Peripheral neuropathy  10/27/12 right foot neuropathy labs tsh 27, MAXIMUS,SPEP,folate,B12,B1,ESR negative, if negative consider EMG NCS  10/29/12 pt states not taking levothyroxine 200 mcg reg, will start daily and repeat labs tsh and lipid in 6 weeks  12/24/12 no improvement in symptoms when taking levothyroxine regularly, has decreased etoh  2/13 EMG NCS normal motor and sensory nerve conduction studies bilateral lower extremities     Preventive health  10/20/20 hep c ab negative  8/9/21 shingrix second  8/9/21  pneumovax  11/1/22 tdap  1/9/23 colon per [facility] 4 polyps adenoma repeat in 3 years  2/21/24 A1c 5.7%  2/21/24 psa 0.5  2/21/24 vit d 32     Renal cyst  10/29/15 ultrasound enlarged liver increased echotexture fatty infiltration or hepatocellular disease, 3.2 cm probable complicated cystic exophytic lesion right kidney  11/30/15 MRI abdomen with and without; posterior lateral exophytic right kidney lesion 2.6 x 1.8 x 2.9 cm, no internal enhancement, consistent with benign cyst, also incidental exophytic 13 mm right renal cyst, no followup is necessary                  Patient Active Problem List   Diagnosis    Hypothyroid    Dyslipidemia    Rosacea    Hypertension    Gout    S/P appendectomy    Preventative health care    Erectile dysfunction    Hearing loss    Peripheral neuropathy    Obesity (BMI 30-39.9)    Renal cyst    Anxiety    Headache    Allergic rhinitis    Meningioma (HCC)    History of COVID-19    Low back pain    Adenoma of colon    GERD (gastroesophageal reflux disease)    Paroxysmal atrial fibrillation (HCC)    S/P ablation of atrial fibrillation                  Patient Care Team:  Luis E Rutherford M.D. as PCP - General      ROS           Objective     /68 (BP Location: Left arm, Patient Position: Sitting, BP Cuff Size: Large adult)   Pulse 65   Temp 36.4 °C (97.5 °F) (Temporal)   Ht 1.829 m (6')   Wt 118 kg (260 lb)   SpO2 96%   BMI 35.26 kg/m²      Physical Exam  Vitals and nursing note reviewed.   Constitutional:       Appearance: Normal appearance.   HENT:      Head: Normocephalic and atraumatic.      Right Ear: External ear normal.      Left Ear: External ear normal.      Nose: Nose normal.   Eyes:      Conjunctiva/sclera: Conjunctivae normal.   Cardiovascular:      Comments: S1-S2 irregular  Pulmonary:      Effort: Pulmonary effort is normal.      Breath sounds: Normal breath sounds.   Abdominal:      General: There is no distension.   Musculoskeletal:         General: No deformity.       Cervical back: Neck supple.   Skin:     Coloration: Skin is not jaundiced.      Findings: No bruising.   Neurological:      General: No focal deficit present.      Mental Status: He is alert.   Psychiatric:         Mood and Affect: Mood normal.         Behavior: Behavior normal.                             Assessment & Plan     Assessment  #1 atrial fibrillation status post PFA pulmonary vein isolation September, recurrent atrial fibrillation documented by EKG cardiology saw him December 11, started on flecainide, continues on Eliquis, metoprolol, has been set up for cardioversion and repeat ablation, currently asymptomatic    #2 chronic thrombophilia related atrial fibrillation on Eliquis no bruising or bleeding, no aspirin or NSAIDs    #3 hypertension stable on metoprolol lisinopril    #4 dyslipidemia on rate fish oil tablets, atorvastatin    #5 gout stable on allopurinol with no breakthrough symptoms    #6 hypothyroid continues on levothyroxine 175 mcg daily last TSH in February 1 0.5    #7 meningioma incidental finding on MRI, has seen neurology, last MRI brain 2 years ago 9 x 5 x 7 mm no mass effect    #8 snoring consider sleep apnea especially with atrial fibrillation diagnosis    #9 reflux stable on Prilosec no breakthrough symptoms, PPI use can cause vitamin D, magnesium, iron, B12 deficiency    #10 BMI 35.26 obesity with comorbidity atrial fibrillation exercise twice weekly, working on nutrition    #11 recent sinus infection symptoms resolved without antibiotics likely viral in nature, no antibiotics are necessary since his symptoms have resolved    #12 impaired glucose metabolism          Plan  #1 follow-up cardiology, continue check blood pressure and heart rate regularly    #2 continue Eliquis per cardiology, no NSAIDs    #3 Labs ordered    #4 sleep apnea referral placed    #5 repeat MRI with and without follow-up meningioma order placed, he can have that in the next few months    #6 continue work on a  good nutrition and exercise program, try to increase his exercise activity at least every other day to keep his weight down    #7 continue limit alcohol on Eliquis, falling and bleeding precautions.  On Eliquis    #8 continue PPI, labs ordered to check for B12, vitamin D, iron, magnesium    #9 influenza vaccine high-dose today, no evidence of acute viral infection, viral symptoms have resolved from when he saw urgent care    #10 Prevnar 20 today    #11 asked patient to get the updated COVID-vaccine at the pharmacy this week    #12 follow-up 6 months

## 2024-12-31 ENCOUNTER — APPOINTMENT (OUTPATIENT)
Dept: CARDIOLOGY | Facility: MEDICAL CENTER | Age: 69
End: 2024-12-31
Attending: NURSE PRACTITIONER
Payer: MEDICARE

## 2024-12-31 ENCOUNTER — HOSPITAL ENCOUNTER (OUTPATIENT)
Facility: MEDICAL CENTER | Age: 69
End: 2024-12-31
Attending: INTERNAL MEDICINE | Admitting: INTERNAL MEDICINE
Payer: MEDICARE

## 2024-12-31 ENCOUNTER — APPOINTMENT (OUTPATIENT)
Dept: MEDICAL GROUP | Facility: MEDICAL CENTER | Age: 69
End: 2024-12-31
Payer: MEDICARE

## 2024-12-31 VITALS
BODY MASS INDEX: 34.82 KG/M2 | DIASTOLIC BLOOD PRESSURE: 92 MMHG | OXYGEN SATURATION: 99 % | WEIGHT: 257.06 LBS | TEMPERATURE: 97.3 F | HEART RATE: 64 BPM | RESPIRATION RATE: 20 BRPM | HEIGHT: 72 IN | SYSTOLIC BLOOD PRESSURE: 148 MMHG

## 2024-12-31 DIAGNOSIS — I48.0 PAROXYSMAL ATRIAL FIBRILLATION (HCC): ICD-10-CM

## 2024-12-31 LAB — EKG IMPRESSION: NORMAL

## 2024-12-31 PROCEDURE — 93010 ELECTROCARDIOGRAM REPORT: CPT | Performed by: INTERNAL MEDICINE

## 2024-12-31 PROCEDURE — 93005 ELECTROCARDIOGRAM TRACING: CPT | Mod: TC | Performed by: INTERNAL MEDICINE

## 2024-12-31 ASSESSMENT — FIBROSIS 4 INDEX: FIB4 SCORE: 1.09

## 2024-12-31 NOTE — PROGRESS NOTES
EKG interpreted by MD Mclean, EKG showing sinus rhythm. Received orders to cancel procedure. Patient and wife updated on POC    1245- patient ambulated off unit with RN. Patient has personal belongings in possession

## 2025-01-02 PROCEDURE — RXMED WILLOW AMBULATORY MEDICATION CHARGE: Performed by: NURSE PRACTITIONER

## 2025-01-03 ENCOUNTER — PHARMACY VISIT (OUTPATIENT)
Dept: PHARMACY | Facility: MEDICAL CENTER | Age: 70
End: 2025-01-03
Payer: COMMERCIAL

## 2025-01-20 PROCEDURE — RXMED WILLOW AMBULATORY MEDICATION CHARGE: Performed by: NURSE PRACTITIONER

## 2025-01-24 ENCOUNTER — PHARMACY VISIT (OUTPATIENT)
Dept: PHARMACY | Facility: MEDICAL CENTER | Age: 70
End: 2025-01-24
Payer: COMMERCIAL

## 2025-02-06 PROCEDURE — RXMED WILLOW AMBULATORY MEDICATION CHARGE: Performed by: INTERNAL MEDICINE

## 2025-02-07 ENCOUNTER — PHARMACY VISIT (OUTPATIENT)
Dept: PHARMACY | Facility: MEDICAL CENTER | Age: 70
End: 2025-02-07
Payer: COMMERCIAL

## 2025-03-05 PROCEDURE — RXMED WILLOW AMBULATORY MEDICATION CHARGE: Performed by: NURSE PRACTITIONER

## 2025-03-07 ENCOUNTER — PHARMACY VISIT (OUTPATIENT)
Dept: PHARMACY | Facility: MEDICAL CENTER | Age: 70
End: 2025-03-07
Payer: COMMERCIAL

## 2025-03-20 ENCOUNTER — PATIENT MESSAGE (OUTPATIENT)
Dept: HEALTH INFORMATION MANAGEMENT | Facility: OTHER | Age: 70
End: 2025-03-20

## 2025-03-31 DIAGNOSIS — I48.0 PAROXYSMAL ATRIAL FIBRILLATION (HCC): ICD-10-CM

## 2025-03-31 PROCEDURE — RXMED WILLOW AMBULATORY MEDICATION CHARGE: Performed by: NURSE PRACTITIONER

## 2025-04-04 ENCOUNTER — PHARMACY VISIT (OUTPATIENT)
Dept: PHARMACY | Facility: MEDICAL CENTER | Age: 70
End: 2025-04-04
Payer: COMMERCIAL

## 2025-04-14 NOTE — PROGRESS NOTES
Subjective:      Luan Schmidt is a 62 y.o. male who presents with Results (Labs) and Shoulder Pain (Left x 2 mo)            HPI     Has had left shoulder pain for the past two months and worse with trying to lift things or go above his head or behind his back, no radiation just stays in the left shoulder, no neck pain associated with this, pain is described as dull ache intense and severe, worse with positions and lying on the left side , no radiation down the arm, no sensory changes hand, no decreased  strength, left-handed male.  No weakness left hand.  No trauma to the area.  No associated chest pain.  Has tried indocin for the left shoulder With some benefit, no GI upset  No tobacco      Current Outpatient Prescriptions   Medication Sig Dispense Refill   • tadalafil (CIALIS) 5 MG tablet Take 1 Tab by mouth every day. 90 Tab 3   • sertraline (ZOLOFT) 50 MG Tab Take 1 Tab by mouth every day. 90 Tab 3   • zolpidem (AMBIEN) 10 MG Tab Take 1 Tab by mouth at bedtime as needed for Sleep. 90 Each 1   • atorvastatin (LIPITOR) 20 MG Tab Take 1 Tab by mouth every day. 90 Tab 3   • levothyroxine (SYNTHROID) 175 MCG Tab Take one po 6 days per week 78 Tab 1   • lisinopril (PRINIVIL) 10 MG Tab Take 1 Tab by mouth every day. 90 Tab 2   • metoprolol SR (TOPROL XL) 100 MG TABLET SR 24 HR Take 1 Tab by mouth every day. 90 Tab 2   • aspirin (ASA) 81 MG CHEW Take 1 Tab by mouth every day. 100 Each 11     No current facility-administered medications for this visit.      Patient Active Problem List    Diagnosis Date Noted   • Anxiety 08/19/2016   • Dyspepsia 02/06/2016   • Renal cyst 10/29/2015   • Obesity (BMI 30-39.9) 08/26/2014   • Insomnia 08/26/2014   • Peripheral neuropathy ([facility]) 12/24/2012   • History of hearing loss 09/17/2012   • Erectile dysfunction 12/30/2011   • Hypothyroid 08/23/2009   • Dyslipidemia 08/23/2009   • Rosacea 08/23/2009   • Hypertension 08/23/2009   • History of gout 08/23/2009   • S/P  appendectomy 08/23/2009   • Preventative health care 08/23/2009       Anxiety  8/19/16 RONALD score 10, PHQ 4, start zoloft 50 mg, declines psychotherapy     Dyslipidemia  3/09 chol  299, trig 367,hdl 47, ldl 179 started on simcor no followup testing   6/10 chol 278,trig 366,hdl 51,ldl 154 add fish oil 2 pills bid, will not increase simcor due to hx gout  4/11 off simcor  4/11 chol 215,trig 232,hdl 39,ldl 130 off meds  12/31/11 chol 245,trig 240,hdl 48,ldl 149 off meds  10/12 chol 268,trig 239,hdl 54,ldl 166 start fish oil two pills daily  11/29/13 chol 265,trig 229,hdl 46,ldl 168  12/19/13 start lipitor 20 mg  3/27/14 chol 190,trig 164,hdl 51,ldl 106 on lipitor 20 mg  8/29/14 chol 208,trig 280,hdl 51,ldl 101 on lipitor 20 mg  10/15/15 chol 187,trig 223,hdl 46,ldl 96 on lipitor 20 mg  9/16/16 chol 182,trig 262,hdl 46,ldl 84 on lipitor 20 mg  10/4/17 chol 221,trig 324,hdl 51,ldl 105 on lipitor 20 mg     Dyspepsia  10/9/15 dyspepsia, chest x-ray, ultrasound, stress echo pending  10/29/15 ultrasound enlarged liver increased echotexture fatty infiltration or hepatocellular disease   11/31/15 TSH ordered for next month and labs for peripheral neuropathy feet, declines MRI lumbar spine repeat EMG NCS for now, cont limit etoh  11/19/15 stress echo mild concentric LVH on baseline echo, excellent exercise tolerance, negative stress echo for ischemia, brenna protocol 11 minutes 48 seconds  11/19/15 cxr negative     Erectile dysfunction  12/20/11 sample cialis 10 mg and 5 mg  3/17/14 change cialis to 2.5 mg cost  8/26/14 samples cialis 5 mg  9/16/16 testosterone 234      History of gout  On no medication for over 2 years  6/10 uric 9.6 monitor for gout flare up since on simcor  4/18/11 left elbow pain question gout flareup  4/11 uric 7.1  12/11 uric 7.0  1/17/12 gout flare up  10/12 uric 7.8  11/29/13 uric 7.9  8/29/14 uric 7.9  10/15/15 uric 8.8  9/16/16 uric 7.7  10/4/17 uric 8.7     Hearing loss  9/12  audiology note  mild high-frequency sensorineural loss right ear, brainstem response normal     Hypertension  6/14/10 add lisinopril 10 mg to toprol  mg  3/15/13 treadmill thallium, treadmill component 10 minutes 48 seconds 1 mm upsloping ST segment depression nondiagnostic, hypertensive response to exercise; ejection fraction 61%, no ischemia noted  11/29/13 urine mac < 2.4 on lisinopril 10 mg and toprol  mg  10/15/15 urine mac <0.5 on lisinopril 10 mg and toprol  mg  11/19/15 stress echo mild concentric LVH on baseline echo, excellent exercise tolerance, negative stress echo for ischemia, brenna protocol 11 minutes 48 seconds  9/16/16 urine mac<0.7 on lisinopril 10 mg and toprol  mg  10/4/17 urine mac 8.7 on lisinopril 10 mg and toprol  mg     Hypothyroid  3/09 tsh 7.5 at that time had been on 0.2 mg half a tablet plus 0.125 for a total of 0.225 daily, this was changed to 0.2 mg one tablet po every Tuesday Thursday Saturday Sunday and 0.2 mg 1-1/2 tablets on Monday Wednesday Friday  6/10 tsh 0.148  6/23/10 change to levothyroxine 0.2 daily repeat in 6 weeks  8/27/10 tsh 2.7   4/11 tsh 0.6 on levothyroxine 200 mcg  10/12 tsh 27 on levothyroxine 200 mcg but not taking regularly  11/29/13 tsh 8.6 on levothyroxine 200 mcg daily  12/19/13 take levothyroxine 200 mg before eating  3/27/14 tsh 0.8 on levothyroxine 200 mcg  8/29/14 tsh 8.3 on levothyroxine 200 mcg taking thyroid med with supplements in am he will try to change to taking medication separate from everything, repeat labs 6 weeks  10/15/15 tsh 0.12 on levothyroxine 200 mcg daily change to levothyroxine 175 mcg  3/23/16 tsh 1.1 on levothyroxine 175 mcg  9/16/16 tsh 0.1 on levothyroxine 175 mcg change to one po six days per week, repeat tsh 6 weeks  1/30/17 tsh 1.3 on levothyroxine 175 mcg six days per week  10/4/17 tsh 2.1 on levothyroxine 175 mcg six days per week     Insomnia  8/26/14 trial ambien 10 mg  9/7/17 continues on ambien, declines sleep  psychology evaluation, work on taper     Peripheral neuropathy  10/27/12 right foot neuropathy labs tsh 27, MAXIMUS,SPEP,folate,B12,B1,ESR negative, if negative consider EMG NCS  10/29/12 pt states not taking levothyroxine 200 mcg reg, will start daily and repeat labs tsh and lipid in 6 weeks  12/24/12 no improvement in symptoms when taking levothyroxine regularly, has decreased etoh  2/13 EMG NCS normal motor and sensory nerve conduction studies bilateral lower extremities     Preventive health  12/30/11 tdap  2/29/12 colon per [facility] repeat in 10 yrs  8/26/14 zoster vaccine  10/9/15 declines prostate and rectal exam  9/16/16 psa 0.45  9/16/16 vit d 11 start [id number] weekly     Renal cyst  10/29/15 ultrasound enlarged liver increased echotexture fatty infiltration or hepatocellular disease, 3.2 cm probable complicated cystic exophytic lesion right kidney  11/30/15 MRI abdomen with and without; posterior lateral exophytic right kidney lesion 2.6 x 1.8 x 2.9 cm, no internal enhancement, consistent with benign cyst, also incidental exophytic 13 mm right renal cyst, no followup is necessary                ROS       Objective:     Blood pressure 130/82, pulse (!) 52, temperature 36.8 °C (98.2 °F), height 1.829 m (6'), weight 117.5 kg (259 lb), SpO2 98 %.        Physical Exam   Constitutional: He appears well-developed and well-nourished. No distress.   HENT:   Head: Normocephalic and atraumatic.   Eyes: Conjunctivae are normal. Right eye exhibits no discharge. Left eye exhibits no discharge.   Neck: No JVD present.   Cardiovascular: Normal rate, regular rhythm and normal heart sounds.    Pulmonary/Chest: Effort normal and breath sounds normal. No respiratory distress. He has no wheezes.   Abdominal: He exhibits no distension.   Musculoskeletal: He exhibits no edema.   Neurological: He is alert.   Skin: Skin is warm. He is not diaphoretic. No erythema.   Psychiatric: He has a normal mood and affect. His behavior is normal.    Nursing note and vitals reviewed.     Normal cervical range of motion  No spinal tenderness  Left shoulder normal extension, abduction 120°, negative May, able to place left hand on right shoulder and left hand behind back without restriction, no tenderness clavicle or glenohumeral joint, able to internally rotate left shoulder and extend left arm above head without difficulty          Assessment/Plan:     Assessment  #! Left shoulder pain question bursitis, fairly reasonable range of motion without restriction, negative rotator cuff signs , no evidence of cervical radiculopathy      Plan  #1 left shoulder x-ray    #2 left shoulder injection recommended, reason for injection his left shoulder bursitis not improved with rest, chronic 2 months in nature    #3 recommend left shoulder injection, informed consent verbal and written obtained.  Understand risk of local anesthetic, he has had that before, can cause nausea, lightheadedness, dizziness, palpitation, procedure may increase risk of bleeding, infection, tendon rupture    #4 left shoulder area prepped and draped, utilizing posterior approach, left acromion process posterior approach area demarcated, 1 cm medial and distal to the acromion process area marked, cleaned and prepped in sterile fashion, utilizing 27-gauge needle, 1 cc triamcinolone 20 mg and lidocaine 1% without 1 cc utilized, 3 cc syringe, needle directed toward sternal notch, needle introduced, medications administered without complication, area dressed    #5 monitor for redness, discharge, drainage, fevers or chills    #6 notified should that occur    #7 medication may take 3 days for benefit, no heavy lifting    #8 consider physical therapy if no improvement    #9 follow-up 4 weeks               105

## 2025-04-22 PROCEDURE — RXMED WILLOW AMBULATORY MEDICATION CHARGE: Performed by: NURSE PRACTITIONER

## 2025-04-25 ENCOUNTER — PHARMACY VISIT (OUTPATIENT)
Dept: PHARMACY | Facility: MEDICAL CENTER | Age: 70
End: 2025-04-25
Payer: COMMERCIAL

## 2025-05-05 ENCOUNTER — TELEPHONE (OUTPATIENT)
Dept: MEDICAL GROUP | Facility: MEDICAL CENTER | Age: 70
End: 2025-05-05
Payer: MEDICARE

## 2025-05-05 PROCEDURE — RXMED WILLOW AMBULATORY MEDICATION CHARGE: Performed by: INTERNAL MEDICINE

## 2025-05-05 RX ORDER — ICOSAPENT ETHYL 1 G/1
2 CAPSULE ORAL 2 TIMES DAILY
Qty: 400 CAPSULE | Refills: 0 | Status: SHIPPED | OUTPATIENT
Start: 2025-05-05

## 2025-05-12 ENCOUNTER — PHARMACY VISIT (OUTPATIENT)
Dept: PHARMACY | Facility: MEDICAL CENTER | Age: 70
End: 2025-05-12
Payer: COMMERCIAL

## 2025-06-03 PROCEDURE — RXMED WILLOW AMBULATORY MEDICATION CHARGE: Performed by: NURSE PRACTITIONER

## 2025-06-05 ENCOUNTER — PHARMACY VISIT (OUTPATIENT)
Dept: PHARMACY | Facility: MEDICAL CENTER | Age: 70
End: 2025-06-05
Payer: COMMERCIAL

## 2025-06-20 PROCEDURE — RXMED WILLOW AMBULATORY MEDICATION CHARGE: Performed by: NURSE PRACTITIONER

## 2025-06-24 DIAGNOSIS — E03.9 HYPOTHYROIDISM, UNSPECIFIED TYPE: ICD-10-CM

## 2025-06-25 ENCOUNTER — TELEPHONE (OUTPATIENT)
Dept: MEDICAL GROUP | Facility: MEDICAL CENTER | Age: 70
End: 2025-06-25
Payer: MEDICARE

## 2025-06-25 DIAGNOSIS — E78.5 DYSLIPIDEMIA: ICD-10-CM

## 2025-06-25 DIAGNOSIS — M10.9 GOUT OF FOOT, UNSPECIFIED CAUSE, UNSPECIFIED CHRONICITY, UNSPECIFIED LATERALITY: ICD-10-CM

## 2025-06-25 DIAGNOSIS — I10 ESSENTIAL HYPERTENSION: ICD-10-CM

## 2025-06-25 RX ORDER — LEVOTHYROXINE SODIUM 175 UG/1
175 TABLET ORAL
Qty: 45 TABLET | Refills: 0 | Status: SHIPPED | OUTPATIENT
Start: 2025-06-25

## 2025-06-26 RX ORDER — ALLOPURINOL 300 MG/1
300 TABLET ORAL DAILY
Qty: 45 TABLET | Refills: 0 | Status: SHIPPED | OUTPATIENT
Start: 2025-06-26

## 2025-06-27 RX ORDER — LISINOPRIL 10 MG/1
10 TABLET ORAL DAILY
Qty: 100 TABLET | Refills: 2 | OUTPATIENT
Start: 2025-06-27

## 2025-06-27 RX ORDER — ATORVASTATIN CALCIUM 20 MG/1
20 TABLET, FILM COATED ORAL DAILY
Qty: 100 TABLET | Refills: 2 | OUTPATIENT
Start: 2025-06-27

## 2025-06-30 ENCOUNTER — PHARMACY VISIT (OUTPATIENT)
Dept: PHARMACY | Facility: MEDICAL CENTER | Age: 70
End: 2025-06-30
Payer: COMMERCIAL

## 2025-07-16 ENCOUNTER — TELEPHONE (OUTPATIENT)
Dept: HEALTH INFORMATION MANAGEMENT | Facility: OTHER | Age: 70
End: 2025-07-16
Payer: MEDICARE

## 2025-07-29 ENCOUNTER — HOSPITAL ENCOUNTER (OUTPATIENT)
Dept: LAB | Facility: MEDICAL CENTER | Age: 70
End: 2025-07-29
Attending: INTERNAL MEDICINE
Payer: MEDICARE

## 2025-07-29 DIAGNOSIS — R79.0 LOW MAGNESIUM LEVEL: ICD-10-CM

## 2025-07-29 DIAGNOSIS — E61.1 IRON DEFICIENCY: ICD-10-CM

## 2025-07-29 DIAGNOSIS — I10 PRIMARY HYPERTENSION: ICD-10-CM

## 2025-07-29 DIAGNOSIS — R73.09 IMPAIRED GLUCOSE METABOLISM: ICD-10-CM

## 2025-07-29 DIAGNOSIS — E53.8 B12 DEFICIENCY: ICD-10-CM

## 2025-07-29 DIAGNOSIS — E78.5 DYSLIPIDEMIA: Chronic | ICD-10-CM

## 2025-07-29 DIAGNOSIS — M10.9 GOUT OF FOOT, UNSPECIFIED CAUSE, UNSPECIFIED CHRONICITY, UNSPECIFIED LATERALITY: ICD-10-CM

## 2025-07-29 DIAGNOSIS — E55.9 VITAMIN D DEFICIENCY: ICD-10-CM

## 2025-07-29 LAB
25(OH)D3 SERPL-MCNC: 29 NG/ML (ref 30–100)
ALBUMIN SERPL BCP-MCNC: 4.3 G/DL (ref 3.2–4.9)
ALBUMIN/GLOB SERPL: 1.6 G/DL
ALP SERPL-CCNC: 82 U/L (ref 30–99)
ALT SERPL-CCNC: 35 U/L (ref 2–50)
ANION GAP SERPL CALC-SCNC: 13 MMOL/L (ref 7–16)
APPEARANCE UR: CLEAR
AST SERPL-CCNC: 29 U/L (ref 12–45)
BASOPHILS # BLD AUTO: 0.4 % (ref 0–1.8)
BASOPHILS # BLD: 0.03 K/UL (ref 0–0.12)
BILIRUB SERPL-MCNC: 0.4 MG/DL (ref 0.1–1.5)
BILIRUB UR QL STRIP.AUTO: NEGATIVE
BUN SERPL-MCNC: 24 MG/DL (ref 8–22)
CALCIUM ALBUM COR SERPL-MCNC: 9.2 MG/DL (ref 8.5–10.5)
CALCIUM SERPL-MCNC: 9.4 MG/DL (ref 8.5–10.5)
CHLORIDE SERPL-SCNC: 106 MMOL/L (ref 96–112)
CHOLEST SERPL-MCNC: 218 MG/DL (ref 100–199)
CO2 SERPL-SCNC: 21 MMOL/L (ref 20–33)
COLOR UR: YELLOW
CREAT SERPL-MCNC: 1.01 MG/DL (ref 0.5–1.4)
CREAT UR-MCNC: 135 MG/DL
EOSINOPHIL # BLD AUTO: 0.15 K/UL (ref 0–0.51)
EOSINOPHIL NFR BLD: 2 % (ref 0–6.9)
ERYTHROCYTE [DISTWIDTH] IN BLOOD BY AUTOMATED COUNT: 46.1 FL (ref 35.9–50)
EST. AVERAGE GLUCOSE BLD GHB EST-MCNC: 114 MG/DL
GFR SERPLBLD CREATININE-BSD FMLA CKD-EPI: 80 ML/MIN/1.73 M 2
GLOBULIN SER CALC-MCNC: 2.7 G/DL (ref 1.9–3.5)
GLUCOSE SERPL-MCNC: 106 MG/DL (ref 65–99)
GLUCOSE UR STRIP.AUTO-MCNC: NEGATIVE MG/DL
HBA1C MFR BLD: 5.6 % (ref 4–5.6)
HCT VFR BLD AUTO: 42.5 % (ref 42–52)
HDLC SERPL-MCNC: 39 MG/DL
HGB BLD-MCNC: 14 G/DL (ref 14–18)
IMM GRANULOCYTES # BLD AUTO: 0.05 K/UL (ref 0–0.11)
IMM GRANULOCYTES NFR BLD AUTO: 0.7 % (ref 0–0.9)
IRON SERPL-MCNC: 73 UG/DL (ref 50–180)
KETONES UR STRIP.AUTO-MCNC: NEGATIVE MG/DL
LDLC SERPL CALC-MCNC: 117 MG/DL
LEUKOCYTE ESTERASE UR QL STRIP.AUTO: NEGATIVE
LYMPHOCYTES # BLD AUTO: 2.02 K/UL (ref 1–4.8)
LYMPHOCYTES NFR BLD: 27 % (ref 22–41)
MAGNESIUM SERPL-MCNC: 1.8 MG/DL (ref 1.5–2.5)
MCH RBC QN AUTO: 31.3 PG (ref 27–33)
MCHC RBC AUTO-ENTMCNC: 32.9 G/DL (ref 32.3–36.5)
MCV RBC AUTO: 95.1 FL (ref 81.4–97.8)
MICRO URNS: NORMAL
MICROALBUMIN UR-MCNC: <1.2 MG/DL
MICROALBUMIN/CREAT UR: NORMAL MG/G (ref 0–30)
MONOCYTES # BLD AUTO: 0.56 K/UL (ref 0–0.85)
MONOCYTES NFR BLD AUTO: 7.5 % (ref 0–13.4)
NEUTROPHILS # BLD AUTO: 4.66 K/UL (ref 1.82–7.42)
NEUTROPHILS NFR BLD: 62.4 % (ref 44–72)
NITRITE UR QL STRIP.AUTO: NEGATIVE
NRBC # BLD AUTO: 0 K/UL
NRBC BLD-RTO: 0 /100 WBC (ref 0–0.2)
PH UR STRIP.AUTO: 5.5 [PH] (ref 5–8)
PLATELET # BLD AUTO: 192 K/UL (ref 164–446)
PMV BLD AUTO: 10.8 FL (ref 9–12.9)
POTASSIUM SERPL-SCNC: 4.6 MMOL/L (ref 3.6–5.5)
PROT SERPL-MCNC: 7 G/DL (ref 6–8.2)
PROT UR QL STRIP: NEGATIVE MG/DL
RBC # BLD AUTO: 4.47 M/UL (ref 4.7–6.1)
RBC UR QL AUTO: NEGATIVE
SODIUM SERPL-SCNC: 140 MMOL/L (ref 135–145)
SP GR UR STRIP.AUTO: 1.02
TRIGL SERPL-MCNC: 309 MG/DL (ref 0–149)
TSH SERPL-ACNC: 0.51 UIU/ML (ref 0.38–5.33)
URATE SERPL-MCNC: 4.5 MG/DL (ref 2.5–8.3)
UROBILINOGEN UR STRIP.AUTO-MCNC: 0.2 EU/DL
VIT B12 SERPL-MCNC: 370 PG/ML (ref 211–911)
WBC # BLD AUTO: 7.5 K/UL (ref 4.8–10.8)

## 2025-07-29 PROCEDURE — 82570 ASSAY OF URINE CREATININE: CPT

## 2025-07-29 PROCEDURE — 83540 ASSAY OF IRON: CPT

## 2025-07-29 PROCEDURE — 81003 URINALYSIS AUTO W/O SCOPE: CPT

## 2025-07-29 PROCEDURE — 83036 HEMOGLOBIN GLYCOSYLATED A1C: CPT

## 2025-07-29 PROCEDURE — 80061 LIPID PANEL: CPT

## 2025-07-29 PROCEDURE — 82306 VITAMIN D 25 HYDROXY: CPT

## 2025-07-29 PROCEDURE — 84550 ASSAY OF BLOOD/URIC ACID: CPT

## 2025-07-29 PROCEDURE — 84443 ASSAY THYROID STIM HORMONE: CPT

## 2025-07-29 PROCEDURE — 80053 COMPREHEN METABOLIC PANEL: CPT

## 2025-07-29 PROCEDURE — 83735 ASSAY OF MAGNESIUM: CPT

## 2025-07-29 PROCEDURE — 85025 COMPLETE CBC W/AUTO DIFF WBC: CPT

## 2025-07-29 PROCEDURE — 82043 UR ALBUMIN QUANTITATIVE: CPT

## 2025-07-29 PROCEDURE — 82607 VITAMIN B-12: CPT

## 2025-07-29 PROCEDURE — 36415 COLL VENOUS BLD VENIPUNCTURE: CPT

## 2025-07-30 DIAGNOSIS — K21.9 GASTROESOPHAGEAL REFLUX DISEASE, UNSPECIFIED WHETHER ESOPHAGITIS PRESENT: ICD-10-CM

## 2025-07-30 DIAGNOSIS — I48.0 PAROXYSMAL ATRIAL FIBRILLATION (HCC): ICD-10-CM

## 2025-07-30 DIAGNOSIS — Z12.5 PROSTATE CANCER SCREENING: ICD-10-CM

## 2025-07-30 DIAGNOSIS — E03.9 HYPOTHYROIDISM, UNSPECIFIED TYPE: ICD-10-CM

## 2025-07-30 DIAGNOSIS — K76.0 HEPATIC STEATOSIS: Primary | ICD-10-CM

## 2025-07-30 DIAGNOSIS — E78.5 DYSLIPIDEMIA: Chronic | ICD-10-CM

## 2025-07-30 DIAGNOSIS — I10 ESSENTIAL HYPERTENSION: ICD-10-CM

## 2025-07-30 DIAGNOSIS — M10.9 GOUT OF FOOT, UNSPECIFIED CAUSE, UNSPECIFIED CHRONICITY, UNSPECIFIED LATERALITY: ICD-10-CM

## 2025-07-30 DIAGNOSIS — R45.86 MOOD DISTURBANCE: ICD-10-CM

## 2025-07-30 DIAGNOSIS — E78.5 DYSLIPIDEMIA: ICD-10-CM

## 2025-07-30 PROBLEM — E66.01 SEVERE OBESITY (HCC): Status: RESOLVED | Noted: 2024-12-30 | Resolved: 2025-07-30

## 2025-07-30 RX ORDER — ATORVASTATIN CALCIUM 20 MG/1
20 TABLET, FILM COATED ORAL DAILY
Qty: 100 TABLET | Refills: 2 | Status: CANCELLED | OUTPATIENT
Start: 2025-07-30

## 2025-07-30 RX ORDER — ALLOPURINOL 300 MG/1
300 TABLET ORAL DAILY
Qty: 100 TABLET | Refills: 1 | Status: SHIPPED | OUTPATIENT
Start: 2025-07-30

## 2025-07-30 RX ORDER — ATORVASTATIN CALCIUM 80 MG/1
80 TABLET, FILM COATED ORAL NIGHTLY
Qty: 100 TABLET | Refills: 0 | Status: SHIPPED | OUTPATIENT
Start: 2025-07-30 | End: 2026-09-03

## 2025-07-31 PROCEDURE — RXMED WILLOW AMBULATORY MEDICATION CHARGE: Performed by: INTERNAL MEDICINE

## 2025-08-01 DIAGNOSIS — K21.9 GASTROESOPHAGEAL REFLUX DISEASE, UNSPECIFIED WHETHER ESOPHAGITIS PRESENT: ICD-10-CM

## 2025-08-01 DIAGNOSIS — R45.86 MOOD DISTURBANCE: ICD-10-CM

## 2025-08-01 PROCEDURE — RXMED WILLOW AMBULATORY MEDICATION CHARGE: Performed by: NURSE PRACTITIONER

## 2025-08-01 PROCEDURE — RXMED WILLOW AMBULATORY MEDICATION CHARGE: Performed by: INTERNAL MEDICINE

## 2025-08-01 RX ORDER — LISINOPRIL 10 MG/1
10 TABLET ORAL DAILY
Qty: 100 TABLET | Refills: 0 | Status: SHIPPED | OUTPATIENT
Start: 2025-08-01

## 2025-08-01 RX ORDER — OMEPRAZOLE 20 MG/1
20 CAPSULE, DELAYED RELEASE ORAL DAILY
Qty: 100 CAPSULE | Refills: 1 | Status: SHIPPED | OUTPATIENT
Start: 2025-08-01

## 2025-08-01 RX ORDER — OMEPRAZOLE 20 MG/1
20 CAPSULE, DELAYED RELEASE ORAL DAILY
Qty: 100 CAPSULE | Refills: 0 | OUTPATIENT
Start: 2025-08-01

## 2025-08-03 PROBLEM — Z87.898 HISTORY OF HEADACHE: Status: ACTIVE | Noted: 2019-12-03

## 2025-08-04 ENCOUNTER — SPECIALTY PHARMACY (OUTPATIENT)
Dept: CARDIOLOGY | Facility: MEDICAL CENTER | Age: 70
End: 2025-08-04
Payer: MEDICARE

## 2025-08-05 ENCOUNTER — OFFICE VISIT (OUTPATIENT)
Dept: MEDICAL GROUP | Facility: MEDICAL CENTER | Age: 70
End: 2025-08-05
Payer: MEDICARE

## 2025-08-05 VITALS
DIASTOLIC BLOOD PRESSURE: 74 MMHG | TEMPERATURE: 97.3 F | OXYGEN SATURATION: 96 % | BODY MASS INDEX: 35.41 KG/M2 | WEIGHT: 261.4 LBS | HEART RATE: 61 BPM | HEIGHT: 72 IN | SYSTOLIC BLOOD PRESSURE: 132 MMHG

## 2025-08-05 DIAGNOSIS — F41.9 ANXIETY: ICD-10-CM

## 2025-08-05 DIAGNOSIS — E78.5 DYSLIPIDEMIA: Primary | Chronic | ICD-10-CM

## 2025-08-05 DIAGNOSIS — D32.9 MENINGIOMA (HCC): ICD-10-CM

## 2025-08-05 DIAGNOSIS — E66.01 SEVERE OBESITY (HCC): ICD-10-CM

## 2025-08-05 DIAGNOSIS — M54.50 LOW BACK PAIN, UNSPECIFIED BACK PAIN LATERALITY, UNSPECIFIED CHRONICITY, UNSPECIFIED WHETHER SCIATICA PRESENT: ICD-10-CM

## 2025-08-05 DIAGNOSIS — I48.0 PAROXYSMAL ATRIAL FIBRILLATION (HCC): ICD-10-CM

## 2025-08-05 DIAGNOSIS — E55.9 VITAMIN D DEFICIENCY: ICD-10-CM

## 2025-08-05 DIAGNOSIS — Z12.5 PROSTATE CANCER SCREENING: ICD-10-CM

## 2025-08-05 DIAGNOSIS — K76.0 HEPATIC STEATOSIS: ICD-10-CM

## 2025-08-05 DIAGNOSIS — Z91.89 RISK FACTORS FOR OBSTRUCTIVE SLEEP APNEA: ICD-10-CM

## 2025-08-05 DIAGNOSIS — E53.8 B12 DEFICIENCY: ICD-10-CM

## 2025-08-05 PROCEDURE — 3078F DIAST BP <80 MM HG: CPT | Performed by: INTERNAL MEDICINE

## 2025-08-05 PROCEDURE — 3075F SYST BP GE 130 - 139MM HG: CPT | Performed by: INTERNAL MEDICINE

## 2025-08-05 PROCEDURE — 99214 OFFICE O/P EST MOD 30 MIN: CPT | Performed by: INTERNAL MEDICINE

## 2025-08-05 ASSESSMENT — PATIENT HEALTH QUESTIONNAIRE - PHQ9: CLINICAL INTERPRETATION OF PHQ2 SCORE: 0

## 2025-08-05 ASSESSMENT — FIBROSIS 4 INDEX: FIB4 SCORE: 1.76

## 2025-08-06 PROBLEM — Z91.89 RISK FACTORS FOR OBSTRUCTIVE SLEEP APNEA: Status: ACTIVE | Noted: 2025-08-06

## 2025-08-06 PROBLEM — E66.01 SEVERE OBESITY (HCC): Status: RESOLVED | Noted: 2024-12-30 | Resolved: 2025-08-06

## 2025-08-06 PROBLEM — F10.20 ETOH DEPENDENCE (HCC): Status: ACTIVE | Noted: 2025-08-06

## 2025-08-08 ENCOUNTER — SPECIALTY PHARMACY (OUTPATIENT)
Dept: CARDIOLOGY | Facility: MEDICAL CENTER | Age: 70
End: 2025-08-08
Payer: MEDICARE

## 2025-08-08 ENCOUNTER — TELEPHONE (OUTPATIENT)
Dept: CARDIOLOGY | Facility: MEDICAL CENTER | Age: 70
End: 2025-08-08
Payer: MEDICARE

## 2025-08-11 ENCOUNTER — PHARMACY VISIT (OUTPATIENT)
Dept: PHARMACY | Facility: MEDICAL CENTER | Age: 70
End: 2025-08-11
Payer: COMMERCIAL

## 2025-08-13 ENCOUNTER — HOSPITAL ENCOUNTER (OUTPATIENT)
Dept: LAB | Facility: MEDICAL CENTER | Age: 70
End: 2025-08-13
Attending: NURSE PRACTITIONER
Payer: MEDICARE

## 2025-08-13 ENCOUNTER — RESULTS FOLLOW-UP (OUTPATIENT)
Dept: CARDIOLOGY | Facility: MEDICAL CENTER | Age: 70
End: 2025-08-13
Payer: MEDICARE

## 2025-08-13 DIAGNOSIS — I10 ESSENTIAL HYPERTENSION: ICD-10-CM

## 2025-08-13 DIAGNOSIS — E78.5 DYSLIPIDEMIA: ICD-10-CM

## 2025-08-13 LAB
ALBUMIN SERPL BCP-MCNC: 4.3 G/DL (ref 3.2–4.9)
ALBUMIN/GLOB SERPL: 1.5 G/DL
ALP SERPL-CCNC: 80 U/L (ref 30–99)
ALT SERPL-CCNC: 34 U/L (ref 2–50)
ANION GAP SERPL CALC-SCNC: 12 MMOL/L (ref 7–16)
AST SERPL-CCNC: 24 U/L (ref 12–45)
BILIRUB SERPL-MCNC: 0.5 MG/DL (ref 0.1–1.5)
BUN SERPL-MCNC: 17 MG/DL (ref 8–22)
CALCIUM ALBUM COR SERPL-MCNC: 9.1 MG/DL (ref 8.5–10.5)
CALCIUM SERPL-MCNC: 9.3 MG/DL (ref 8.5–10.5)
CHLORIDE SERPL-SCNC: 105 MMOL/L (ref 96–112)
CHOLEST SERPL-MCNC: 199 MG/DL (ref 100–199)
CO2 SERPL-SCNC: 23 MMOL/L (ref 20–33)
CREAT SERPL-MCNC: 1.01 MG/DL (ref 0.5–1.4)
ERYTHROCYTE [DISTWIDTH] IN BLOOD BY AUTOMATED COUNT: 44.6 FL (ref 35.9–50)
GFR SERPLBLD CREATININE-BSD FMLA CKD-EPI: 80 ML/MIN/1.73 M 2
GLOBULIN SER CALC-MCNC: 2.8 G/DL (ref 1.9–3.5)
GLUCOSE SERPL-MCNC: 94 MG/DL (ref 65–99)
HCT VFR BLD AUTO: 42.5 % (ref 42–52)
HDLC SERPL-MCNC: 40 MG/DL
HGB BLD-MCNC: 14.5 G/DL (ref 14–18)
LDLC SERPL CALC-MCNC: 114 MG/DL
MCH RBC QN AUTO: 31.3 PG (ref 27–33)
MCHC RBC AUTO-ENTMCNC: 34.1 G/DL (ref 32.3–36.5)
MCV RBC AUTO: 91.8 FL (ref 81.4–97.8)
PLATELET # BLD AUTO: 187 K/UL (ref 164–446)
PMV BLD AUTO: 10.7 FL (ref 9–12.9)
POTASSIUM SERPL-SCNC: 4.3 MMOL/L (ref 3.6–5.5)
PROT SERPL-MCNC: 7.1 G/DL (ref 6–8.2)
RBC # BLD AUTO: 4.63 M/UL (ref 4.7–6.1)
SODIUM SERPL-SCNC: 140 MMOL/L (ref 135–145)
TRIGL SERPL-MCNC: 223 MG/DL (ref 0–149)
WBC # BLD AUTO: 7.6 K/UL (ref 4.8–10.8)

## 2025-08-13 PROCEDURE — 80061 LIPID PANEL: CPT

## 2025-08-13 PROCEDURE — 36415 COLL VENOUS BLD VENIPUNCTURE: CPT

## 2025-08-13 PROCEDURE — 85027 COMPLETE CBC AUTOMATED: CPT

## 2025-08-13 PROCEDURE — 80053 COMPREHEN METABOLIC PANEL: CPT

## 2025-08-15 RX ORDER — ICOSAPENT ETHYL 1 G/1
2 CAPSULE ORAL 2 TIMES DAILY
Qty: 400 CAPSULE | Refills: 2 | Status: SHIPPED | OUTPATIENT
Start: 2025-08-15

## 2025-08-18 ENCOUNTER — OFFICE VISIT (OUTPATIENT)
Dept: CARDIOLOGY | Facility: MEDICAL CENTER | Age: 70
End: 2025-08-18
Attending: INTERNAL MEDICINE
Payer: MEDICARE

## 2025-08-18 VITALS
DIASTOLIC BLOOD PRESSURE: 80 MMHG | RESPIRATION RATE: 16 BRPM | WEIGHT: 258 LBS | HEART RATE: 64 BPM | HEIGHT: 72 IN | OXYGEN SATURATION: 95 % | SYSTOLIC BLOOD PRESSURE: 134 MMHG | BODY MASS INDEX: 34.95 KG/M2

## 2025-08-18 DIAGNOSIS — I48.0 PAROXYSMAL ATRIAL FIBRILLATION (HCC): Primary | ICD-10-CM

## 2025-08-18 LAB — EKG IMPRESSION: NORMAL

## 2025-08-18 PROCEDURE — 3079F DIAST BP 80-89 MM HG: CPT | Performed by: NURSE PRACTITIONER

## 2025-08-18 PROCEDURE — 99213 OFFICE O/P EST LOW 20 MIN: CPT | Performed by: NURSE PRACTITIONER

## 2025-08-18 PROCEDURE — 99212 OFFICE O/P EST SF 10 MIN: CPT | Performed by: NURSE PRACTITIONER

## 2025-08-18 PROCEDURE — 93005 ELECTROCARDIOGRAM TRACING: CPT | Mod: TC | Performed by: NURSE PRACTITIONER

## 2025-08-18 PROCEDURE — 3075F SYST BP GE 130 - 139MM HG: CPT | Performed by: NURSE PRACTITIONER

## 2025-08-18 ASSESSMENT — LIFESTYLE VARIABLES
SKIP TO QUESTIONS 9-10: 0
AUDIT-C TOTAL SCORE: 6
HOW MANY STANDARD DRINKS CONTAINING ALCOHOL DO YOU HAVE ON A TYPICAL DAY: 1 OR 2
HOW OFTEN DO YOU HAVE A DRINK CONTAINING ALCOHOL: 4 OR MORE TIMES A WEEK
HOW OFTEN DO YOU HAVE SIX OR MORE DRINKS ON ONE OCCASION: MONTHLY

## 2025-08-18 ASSESSMENT — ENCOUNTER SYMPTOMS
CLAUDICATION: 0
NERVOUS/ANXIOUS: 0
DIZZINESS: 0
HALLUCINATIONS: 0
SHORTNESS OF BREATH: 0
GASTROINTESTINAL NEGATIVE: 1
PND: 0
NEUROLOGICAL NEGATIVE: 1
MUSCULOSKELETAL NEGATIVE: 1
SENSORY CHANGE: 0
WHEEZING: 0
BRUISES/BLEEDS EASILY: 0
DEPRESSION: 0
ORTHOPNEA: 0
NAUSEA: 0
PALPITATIONS: 1
EYES NEGATIVE: 1
RESPIRATORY NEGATIVE: 1

## 2025-08-18 ASSESSMENT — FIBROSIS 4 INDEX: FIB4 SCORE: 1.52

## 2025-08-19 ENCOUNTER — TELEPHONE (OUTPATIENT)
Dept: CARDIOLOGY | Facility: MEDICAL CENTER | Age: 70
End: 2025-08-19
Payer: MEDICARE

## 2025-08-19 DIAGNOSIS — I48.0 PAROXYSMAL ATRIAL FIBRILLATION (HCC): Primary | ICD-10-CM

## 2025-08-26 ENCOUNTER — PATIENT MESSAGE (OUTPATIENT)
Dept: SLEEP MEDICINE | Facility: MEDICAL CENTER | Age: 70
End: 2025-08-26
Payer: MEDICARE

## 2025-08-27 ENCOUNTER — APPOINTMENT (OUTPATIENT)
Dept: CARDIOLOGY | Facility: MEDICAL CENTER | Age: 70
End: 2025-08-27
Attending: NURSE PRACTITIONER
Payer: MEDICARE

## 2025-08-28 ENCOUNTER — HOME STUDY (OUTPATIENT)
Dept: SLEEP MEDICINE | Facility: MEDICAL CENTER | Age: 70
End: 2025-08-28
Attending: INTERNAL MEDICINE
Payer: MEDICARE

## 2025-08-28 DIAGNOSIS — Z91.89 RISK FACTORS FOR OBSTRUCTIVE SLEEP APNEA: ICD-10-CM
